# Patient Record
Sex: FEMALE | Race: WHITE | HISPANIC OR LATINO
[De-identification: names, ages, dates, MRNs, and addresses within clinical notes are randomized per-mention and may not be internally consistent; named-entity substitution may affect disease eponyms.]

---

## 2017-02-07 ENCOUNTER — APPOINTMENT (OUTPATIENT)
Dept: PHYSICAL MEDICINE AND REHAB | Facility: CLINIC | Age: 67
End: 2017-02-07

## 2017-03-16 ENCOUNTER — APPOINTMENT (OUTPATIENT)
Dept: RADIATION ONCOLOGY | Facility: CLINIC | Age: 67
End: 2017-03-16

## 2017-03-16 VITALS
RESPIRATION RATE: 20 BRPM | OXYGEN SATURATION: 97 % | DIASTOLIC BLOOD PRESSURE: 74 MMHG | HEART RATE: 98 BPM | SYSTOLIC BLOOD PRESSURE: 150 MMHG

## 2017-03-22 ENCOUNTER — APPOINTMENT (OUTPATIENT)
Dept: ORTHOPEDIC SURGERY | Facility: CLINIC | Age: 67
End: 2017-03-22

## 2017-03-31 ENCOUNTER — APPOINTMENT (OUTPATIENT)
Dept: PHYSICAL MEDICINE AND REHAB | Facility: CLINIC | Age: 67
End: 2017-03-31

## 2017-03-31 VITALS
DIASTOLIC BLOOD PRESSURE: 97 MMHG | TEMPERATURE: 97.7 F | HEART RATE: 97 BPM | OXYGEN SATURATION: 96 % | SYSTOLIC BLOOD PRESSURE: 163 MMHG

## 2017-05-09 ENCOUNTER — APPOINTMENT (OUTPATIENT)
Dept: SURGICAL ONCOLOGY | Facility: CLINIC | Age: 67
End: 2017-05-09

## 2017-05-18 ENCOUNTER — RESULT REVIEW (OUTPATIENT)
Age: 67
End: 2017-05-18

## 2017-05-23 ENCOUNTER — APPOINTMENT (OUTPATIENT)
Dept: SURGICAL ONCOLOGY | Facility: CLINIC | Age: 67
End: 2017-05-23

## 2017-05-23 VITALS — HEIGHT: 64 IN | BODY MASS INDEX: 30.9 KG/M2 | WEIGHT: 181 LBS | OXYGEN SATURATION: 98 % | HEART RATE: 99 BPM

## 2017-05-23 DIAGNOSIS — R26.2 DIFFICULTY IN WALKING, NOT ELSEWHERE CLASSIFIED: ICD-10-CM

## 2017-05-23 DIAGNOSIS — Z08 ENCOUNTER FOR FOLLOW-UP EXAMINATION AFTER COMPLETED TREATMENT FOR MALIGNANT NEOPLASM: ICD-10-CM

## 2017-05-23 DIAGNOSIS — Z85.3 ENCOUNTER FOR FOLLOW-UP EXAMINATION AFTER COMPLETED TREATMENT FOR MALIGNANT NEOPLASM: ICD-10-CM

## 2017-05-23 RX ORDER — ALBUTEROL 90 MCG
90 AEROSOL (GRAM) INHALATION
Refills: 0 | Status: ACTIVE | COMMUNITY

## 2017-06-23 ENCOUNTER — APPOINTMENT (OUTPATIENT)
Dept: ORTHOPEDIC SURGERY | Facility: CLINIC | Age: 67
End: 2017-06-23

## 2017-06-23 DIAGNOSIS — M17.12 UNILATERAL PRIMARY OSTEOARTHRITIS, LEFT KNEE: ICD-10-CM

## 2017-06-23 DIAGNOSIS — M25.562 PAIN IN LEFT KNEE: ICD-10-CM

## 2017-06-23 RX ORDER — BREXPIPRAZOLE 2 MG/1
2 TABLET ORAL
Qty: 30 | Refills: 0 | Status: ACTIVE | COMMUNITY
Start: 2017-06-05

## 2017-06-23 RX ORDER — LEVOTHYROXINE SODIUM 0.14 MG/1
137 TABLET ORAL
Qty: 30 | Refills: 0 | Status: ACTIVE | COMMUNITY
Start: 2017-01-25

## 2017-09-21 ENCOUNTER — APPOINTMENT (OUTPATIENT)
Dept: RADIATION ONCOLOGY | Facility: CLINIC | Age: 67
End: 2017-09-21
Payer: COMMERCIAL

## 2017-09-21 VITALS
TEMPERATURE: 98.5 F | RESPIRATION RATE: 18 BRPM | SYSTOLIC BLOOD PRESSURE: 147 MMHG | OXYGEN SATURATION: 97 % | DIASTOLIC BLOOD PRESSURE: 86 MMHG | HEART RATE: 86 BPM

## 2017-09-21 PROCEDURE — 99213 OFFICE O/P EST LOW 20 MIN: CPT

## 2017-09-21 RX ORDER — ECONAZOLE NITRATE 10 MG/G
1 AEROSOL, FOAM TOPICAL
Qty: 210 | Refills: 0 | Status: ACTIVE | COMMUNITY
Start: 2017-08-05

## 2017-09-21 RX ORDER — KETOCONAZOLE 20 MG/G
2 CREAM TOPICAL
Qty: 180 | Refills: 0 | Status: ACTIVE | COMMUNITY
Start: 2017-08-05

## 2017-09-27 ENCOUNTER — APPOINTMENT (OUTPATIENT)
Dept: ORTHOPEDIC SURGERY | Facility: CLINIC | Age: 67
End: 2017-09-27

## 2017-10-26 ENCOUNTER — APPOINTMENT (OUTPATIENT)
Dept: PHYSICAL MEDICINE AND REHAB | Facility: CLINIC | Age: 67
End: 2017-10-26
Payer: COMMERCIAL

## 2017-10-26 VITALS
TEMPERATURE: 98.3 F | DIASTOLIC BLOOD PRESSURE: 83 MMHG | HEART RATE: 96 BPM | SYSTOLIC BLOOD PRESSURE: 144 MMHG | OXYGEN SATURATION: 97 %

## 2017-10-26 PROCEDURE — 99213 OFFICE O/P EST LOW 20 MIN: CPT

## 2017-12-08 ENCOUNTER — APPOINTMENT (OUTPATIENT)
Dept: VASCULAR SURGERY | Facility: CLINIC | Age: 67
End: 2017-12-08

## 2018-01-02 ENCOUNTER — MOBILE ON CALL (OUTPATIENT)
Age: 68
End: 2018-01-02

## 2018-01-08 ENCOUNTER — APPOINTMENT (OUTPATIENT)
Dept: BARIATRICS/WEIGHT MGMT | Facility: CLINIC | Age: 68
End: 2018-01-08

## 2018-02-23 ENCOUNTER — EMERGENCY (EMERGENCY)
Facility: HOSPITAL | Age: 68
LOS: 1 days | Discharge: HOME | End: 2018-02-23
Attending: INTERNAL MEDICINE | Admitting: INTERNAL MEDICINE
Payer: COMMERCIAL

## 2018-02-23 VITALS
TEMPERATURE: 98 F | OXYGEN SATURATION: 95 % | HEART RATE: 100 BPM | SYSTOLIC BLOOD PRESSURE: 159 MMHG | DIASTOLIC BLOOD PRESSURE: 86 MMHG | RESPIRATION RATE: 15 BRPM

## 2018-02-23 VITALS
WEIGHT: 175.05 LBS | SYSTOLIC BLOOD PRESSURE: 118 MMHG | HEART RATE: 100 BPM | OXYGEN SATURATION: 93 % | RESPIRATION RATE: 16 BRPM | HEIGHT: 64 IN | DIASTOLIC BLOOD PRESSURE: 60 MMHG | TEMPERATURE: 99 F

## 2018-02-23 DIAGNOSIS — Z90.13 ACQUIRED ABSENCE OF BILATERAL BREASTS AND NIPPLES: Chronic | ICD-10-CM

## 2018-02-23 DIAGNOSIS — Z98.89 OTHER SPECIFIED POSTPROCEDURAL STATES: Chronic | ICD-10-CM

## 2018-02-23 LAB
ALBUMIN SERPL ELPH-MCNC: 3.2 G/DL — LOW (ref 3.3–5)
ALP SERPL-CCNC: 222 U/L — HIGH (ref 30–120)
ALT FLD-CCNC: 137 U/L DA — HIGH (ref 10–60)
ANION GAP SERPL CALC-SCNC: 14 MMOL/L — SIGNIFICANT CHANGE UP (ref 5–17)
APPEARANCE UR: CLEAR — SIGNIFICANT CHANGE UP
AST SERPL-CCNC: 134 U/L — HIGH (ref 10–40)
BACTERIA # UR AUTO: ABNORMAL
BILIRUB SERPL-MCNC: 0.2 MG/DL — SIGNIFICANT CHANGE UP (ref 0.2–1.2)
BILIRUB UR-MCNC: NEGATIVE — SIGNIFICANT CHANGE UP
BUN SERPL-MCNC: 10 MG/DL — SIGNIFICANT CHANGE UP (ref 7–23)
CALCIUM SERPL-MCNC: 8.7 MG/DL — SIGNIFICANT CHANGE UP (ref 8.4–10.5)
CHLORIDE SERPL-SCNC: 102 MMOL/L — SIGNIFICANT CHANGE UP (ref 96–108)
CO2 SERPL-SCNC: 20 MMOL/L — LOW (ref 22–31)
COLOR SPEC: YELLOW — SIGNIFICANT CHANGE UP
CREAT SERPL-MCNC: 0.79 MG/DL — SIGNIFICANT CHANGE UP (ref 0.5–1.3)
DIFF PNL FLD: ABNORMAL
EPI CELLS # UR: SIGNIFICANT CHANGE UP
GLUCOSE SERPL-MCNC: 141 MG/DL — HIGH (ref 70–99)
GLUCOSE UR QL: NEGATIVE MG/DL — SIGNIFICANT CHANGE UP
HCT VFR BLD CALC: 35.4 % — SIGNIFICANT CHANGE UP (ref 34.5–45)
HGB BLD-MCNC: 12.4 G/DL — SIGNIFICANT CHANGE UP (ref 11.5–15.5)
KETONES UR-MCNC: NEGATIVE — SIGNIFICANT CHANGE UP
LACTATE SERPL-SCNC: 0.9 MMOL/L — SIGNIFICANT CHANGE UP (ref 0.7–2)
LACTATE SERPL-SCNC: 2.4 MMOL/L — HIGH (ref 0.7–2)
LEUKOCYTE ESTERASE UR-ACNC: NEGATIVE — SIGNIFICANT CHANGE UP
MCHC RBC-ENTMCNC: 32.6 PG — SIGNIFICANT CHANGE UP (ref 27–34)
MCHC RBC-ENTMCNC: 34.9 GM/DL — SIGNIFICANT CHANGE UP (ref 32–36)
MCV RBC AUTO: 93.4 FL — SIGNIFICANT CHANGE UP (ref 80–100)
NITRITE UR-MCNC: NEGATIVE — SIGNIFICANT CHANGE UP
PH UR: 5 — SIGNIFICANT CHANGE UP (ref 5–8)
PLATELET # BLD AUTO: 172 K/UL — SIGNIFICANT CHANGE UP (ref 150–400)
POTASSIUM SERPL-MCNC: 3.9 MMOL/L — SIGNIFICANT CHANGE UP (ref 3.5–5.3)
POTASSIUM SERPL-SCNC: 3.9 MMOL/L — SIGNIFICANT CHANGE UP (ref 3.5–5.3)
PROT SERPL-MCNC: 7.2 G/DL — SIGNIFICANT CHANGE UP (ref 6–8.3)
PROT UR-MCNC: NEGATIVE MG/DL — SIGNIFICANT CHANGE UP
RBC # BLD: 3.79 M/UL — LOW (ref 3.8–5.2)
RBC # FLD: 12.1 % — SIGNIFICANT CHANGE UP (ref 10.3–14.5)
RBC CASTS # UR COMP ASSIST: SIGNIFICANT CHANGE UP /HPF (ref 0–4)
SODIUM SERPL-SCNC: 136 MMOL/L — SIGNIFICANT CHANGE UP (ref 135–145)
SP GR SPEC: 1.01 — SIGNIFICANT CHANGE UP (ref 1.01–1.02)
UROBILINOGEN FLD QL: NEGATIVE MG/DL — SIGNIFICANT CHANGE UP
WBC # BLD: 7.8 K/UL — SIGNIFICANT CHANGE UP (ref 3.8–10.5)
WBC # FLD AUTO: 7.8 K/UL — SIGNIFICANT CHANGE UP (ref 3.8–10.5)
WBC UR QL: SIGNIFICANT CHANGE UP

## 2018-02-23 PROCEDURE — 74177 CT ABD & PELVIS W/CONTRAST: CPT | Mod: 26

## 2018-02-23 PROCEDURE — 96375 TX/PRO/DX INJ NEW DRUG ADDON: CPT

## 2018-02-23 PROCEDURE — 99284 EMERGENCY DEPT VISIT MOD MDM: CPT | Mod: 25

## 2018-02-23 PROCEDURE — 85027 COMPLETE CBC AUTOMATED: CPT

## 2018-02-23 PROCEDURE — 99285 EMERGENCY DEPT VISIT HI MDM: CPT

## 2018-02-23 PROCEDURE — 87040 BLOOD CULTURE FOR BACTERIA: CPT

## 2018-02-23 PROCEDURE — 71045 X-RAY EXAM CHEST 1 VIEW: CPT | Mod: 26

## 2018-02-23 PROCEDURE — 93005 ELECTROCARDIOGRAM TRACING: CPT

## 2018-02-23 PROCEDURE — 81001 URINALYSIS AUTO W/SCOPE: CPT

## 2018-02-23 PROCEDURE — 80053 COMPREHEN METABOLIC PANEL: CPT

## 2018-02-23 PROCEDURE — 96374 THER/PROPH/DIAG INJ IV PUSH: CPT

## 2018-02-23 PROCEDURE — 96361 HYDRATE IV INFUSION ADD-ON: CPT

## 2018-02-23 PROCEDURE — 74019 RADEX ABDOMEN 2 VIEWS: CPT | Mod: 26

## 2018-02-23 PROCEDURE — 83605 ASSAY OF LACTIC ACID: CPT

## 2018-02-23 PROCEDURE — 96376 TX/PRO/DX INJ SAME DRUG ADON: CPT

## 2018-02-23 PROCEDURE — 74177 CT ABD & PELVIS W/CONTRAST: CPT

## 2018-02-23 PROCEDURE — 71045 X-RAY EXAM CHEST 1 VIEW: CPT

## 2018-02-23 PROCEDURE — 93010 ELECTROCARDIOGRAM REPORT: CPT

## 2018-02-23 PROCEDURE — 74019 RADEX ABDOMEN 2 VIEWS: CPT

## 2018-02-23 PROCEDURE — 36415 COLL VENOUS BLD VENIPUNCTURE: CPT

## 2018-02-23 RX ORDER — HYDROMORPHONE HYDROCHLORIDE 2 MG/ML
0.5 INJECTION INTRAMUSCULAR; INTRAVENOUS; SUBCUTANEOUS ONCE
Qty: 0 | Refills: 0 | Status: DISCONTINUED | OUTPATIENT
Start: 2018-02-23 | End: 2018-02-23

## 2018-02-23 RX ORDER — SODIUM CHLORIDE 9 MG/ML
1000 INJECTION INTRAMUSCULAR; INTRAVENOUS; SUBCUTANEOUS ONCE
Qty: 0 | Refills: 0 | Status: COMPLETED | OUTPATIENT
Start: 2018-02-23 | End: 2018-02-23

## 2018-02-23 RX ORDER — IOHEXOL 300 MG/ML
30 INJECTION, SOLUTION INTRAVENOUS ONCE
Qty: 0 | Refills: 0 | Status: COMPLETED | OUTPATIENT
Start: 2018-02-23 | End: 2018-02-23

## 2018-02-23 RX ORDER — ONDANSETRON 8 MG/1
4 TABLET, FILM COATED ORAL ONCE
Qty: 0 | Refills: 0 | Status: COMPLETED | OUTPATIENT
Start: 2018-02-23 | End: 2018-02-23

## 2018-02-23 RX ADMIN — IOHEXOL 30 MILLILITER(S): 300 INJECTION, SOLUTION INTRAVENOUS at 03:40

## 2018-02-23 RX ADMIN — HYDROMORPHONE HYDROCHLORIDE 0.5 MILLIGRAM(S): 2 INJECTION INTRAMUSCULAR; INTRAVENOUS; SUBCUTANEOUS at 02:49

## 2018-02-23 RX ADMIN — HYDROMORPHONE HYDROCHLORIDE 0.5 MILLIGRAM(S): 2 INJECTION INTRAMUSCULAR; INTRAVENOUS; SUBCUTANEOUS at 03:43

## 2018-02-23 RX ADMIN — SODIUM CHLORIDE 1000 MILLILITER(S): 9 INJECTION INTRAMUSCULAR; INTRAVENOUS; SUBCUTANEOUS at 02:46

## 2018-02-23 RX ADMIN — HYDROMORPHONE HYDROCHLORIDE 0.5 MILLIGRAM(S): 2 INJECTION INTRAMUSCULAR; INTRAVENOUS; SUBCUTANEOUS at 03:26

## 2018-02-23 RX ADMIN — SODIUM CHLORIDE 1000 MILLILITER(S): 9 INJECTION INTRAMUSCULAR; INTRAVENOUS; SUBCUTANEOUS at 03:55

## 2018-02-23 RX ADMIN — ONDANSETRON 4 MILLIGRAM(S): 8 TABLET, FILM COATED ORAL at 02:49

## 2018-02-23 RX ADMIN — HYDROMORPHONE HYDROCHLORIDE 0.5 MILLIGRAM(S): 2 INJECTION INTRAMUSCULAR; INTRAVENOUS; SUBCUTANEOUS at 05:03

## 2018-02-23 NOTE — ED PROVIDER NOTE - SIGNIFICANT NEGATIVE FINDINGS
no headache, no chest pain, no SOB, no palpitations, no diarrhea, no urinary symptoms, no GI  bleeding. no neuro changes.

## 2018-02-23 NOTE — ED ADULT NURSE NOTE - OBJECTIVE STATEMENT
67 yr old female c/o LLQ abdominal pain and nausea. Pt states she had one episode of vomiting at 0000. Denies diarrhea. Hx: diverticulitis.

## 2018-02-23 NOTE — ED PROVIDER NOTE - OBJECTIVE STATEMENT
''I think I have a diverticulitis attack, the pain is very acute', c/o left side abdominal pain started yesterday afternoon. 68 y/o WF h/o  Cholecystectomy Gastric Bypass  C- section x 2 , Diverticulitis  C/C ''I think I have a diverticulitis attack, the pain is very acute', c/o left lower quadrant  abdominal pain,  started yesterday afternoon with N/V x 2 this evening. Feels bloated and not passing gas.. No fever, no chills no  symptoms no GIB.

## 2018-02-23 NOTE — ED PROVIDER NOTE - PMH
Anxiety    Arthritis    Asthma    Breast Cancer    Depression    Environmental Allergies  dust, mold, mildew, cat dander,  Fibromyalgia    Herniated Disc    Hypoglycemia    Hypothyroid    Laryngeal cancer  October 2015 & had Radiation  Neuropathy  lumbar herniated disc affecting feet  Obstructive Sleep Apnea    Osteoarthritis    Osteoporosis    Patellar Fracture  left 11/09  Vocal cord nodule

## 2018-02-23 NOTE — ED ADULT NURSE NOTE - PMH
Anxiety    Arthritis    Asthma    Breast Cancer    Depression    Diverticulitis    Environmental Allergies  dust, mold, mildew, cat dander,  Fibromyalgia    Herniated Disc    Hypoglycemia    Hypothyroid    Laryngeal cancer  October 2015 & had Radiation  Neuropathy  lumbar herniated disc affecting feet  Obstructive Sleep Apnea    Osteoarthritis    Osteoporosis    Patellar Fracture  left 11/09  Vocal cord nodule

## 2018-02-23 NOTE — ED PROVIDER NOTE - PSH
Anal Abscess     Section  X 2  Cosmetic Surgery  liposuction abdomen 11/24/10  H/O breast surgery  Reconstructive surgery - Right & Left  S/P carpal tunnel release  Bilateral  S/P Cholecystectomy    S/P excision of vocal cord nodule  2015  S/P Gastric Bypass   Dr. Gibson  S/P Lumpectomy of Breast  left   S/P mastectomy, bilateral

## 2018-02-23 NOTE — ED ADULT TRIAGE NOTE - CHIEF COMPLAINT QUOTE
''I think I have a diverticulitis attack, the pain is very acute', c/o left side abdominal pain started yesterday afternoon.

## 2018-02-28 ENCOUNTER — OUTPATIENT (OUTPATIENT)
Dept: OUTPATIENT SERVICES | Facility: HOSPITAL | Age: 68
LOS: 1 days | End: 2018-02-28
Payer: COMMERCIAL

## 2018-02-28 ENCOUNTER — APPOINTMENT (OUTPATIENT)
Dept: ULTRASOUND IMAGING | Facility: CLINIC | Age: 68
End: 2018-02-28
Payer: COMMERCIAL

## 2018-02-28 DIAGNOSIS — Z98.89 OTHER SPECIFIED POSTPROCEDURAL STATES: Chronic | ICD-10-CM

## 2018-02-28 DIAGNOSIS — Z90.13 ACQUIRED ABSENCE OF BILATERAL BREASTS AND NIPPLES: Chronic | ICD-10-CM

## 2018-02-28 DIAGNOSIS — Z00.8 ENCOUNTER FOR OTHER GENERAL EXAMINATION: ICD-10-CM

## 2018-02-28 LAB
CULTURE RESULTS: SIGNIFICANT CHANGE UP
CULTURE RESULTS: SIGNIFICANT CHANGE UP
SPECIMEN SOURCE: SIGNIFICANT CHANGE UP
SPECIMEN SOURCE: SIGNIFICANT CHANGE UP

## 2018-02-28 PROCEDURE — 76700 US EXAM ABDOM COMPLETE: CPT

## 2018-02-28 PROCEDURE — 76700 US EXAM ABDOM COMPLETE: CPT | Mod: 26

## 2018-03-08 ENCOUNTER — APPOINTMENT (OUTPATIENT)
Dept: RADIATION ONCOLOGY | Facility: CLINIC | Age: 68
End: 2018-03-08
Payer: COMMERCIAL

## 2018-03-08 VITALS
OXYGEN SATURATION: 97 % | WEIGHT: 181.99 LBS | SYSTOLIC BLOOD PRESSURE: 129 MMHG | HEART RATE: 92 BPM | HEIGHT: 64 IN | TEMPERATURE: 97.8 F | BODY MASS INDEX: 31.07 KG/M2 | RESPIRATION RATE: 18 BRPM | DIASTOLIC BLOOD PRESSURE: 80 MMHG

## 2018-03-08 PROCEDURE — 99213 OFFICE O/P EST LOW 20 MIN: CPT

## 2018-03-28 ENCOUNTER — OUTPATIENT (OUTPATIENT)
Dept: OUTPATIENT SERVICES | Facility: HOSPITAL | Age: 68
LOS: 1 days | End: 2018-03-28
Payer: COMMERCIAL

## 2018-03-28 VITALS
WEIGHT: 175.05 LBS | TEMPERATURE: 99 F | OXYGEN SATURATION: 98 % | SYSTOLIC BLOOD PRESSURE: 121 MMHG | DIASTOLIC BLOOD PRESSURE: 81 MMHG | RESPIRATION RATE: 20 BRPM | HEIGHT: 64 IN | HEART RATE: 96 BPM

## 2018-03-28 DIAGNOSIS — Z98.89 OTHER SPECIFIED POSTPROCEDURAL STATES: Chronic | ICD-10-CM

## 2018-03-28 DIAGNOSIS — F41.9 ANXIETY DISORDER, UNSPECIFIED: ICD-10-CM

## 2018-03-28 DIAGNOSIS — C32.9 MALIGNANT NEOPLASM OF LARYNX, UNSPECIFIED: ICD-10-CM

## 2018-03-28 DIAGNOSIS — G47.33 OBSTRUCTIVE SLEEP APNEA (ADULT) (PEDIATRIC): ICD-10-CM

## 2018-03-28 DIAGNOSIS — C32.0 MALIGNANT NEOPLASM OF GLOTTIS: ICD-10-CM

## 2018-03-28 DIAGNOSIS — J45.909 UNSPECIFIED ASTHMA, UNCOMPLICATED: ICD-10-CM

## 2018-03-28 DIAGNOSIS — Z90.13 ACQUIRED ABSENCE OF BILATERAL BREASTS AND NIPPLES: Chronic | ICD-10-CM

## 2018-03-28 DIAGNOSIS — Z01.818 ENCOUNTER FOR OTHER PREPROCEDURAL EXAMINATION: ICD-10-CM

## 2018-03-28 LAB
ANION GAP SERPL CALC-SCNC: 12 MMOL/L — SIGNIFICANT CHANGE UP (ref 5–17)
BUN SERPL-MCNC: 12 MG/DL — SIGNIFICANT CHANGE UP (ref 7–23)
CALCIUM SERPL-MCNC: 9.6 MG/DL — SIGNIFICANT CHANGE UP (ref 8.4–10.5)
CHLORIDE SERPL-SCNC: 100 MMOL/L — SIGNIFICANT CHANGE UP (ref 96–108)
CO2 SERPL-SCNC: 24 MMOL/L — SIGNIFICANT CHANGE UP (ref 22–31)
CREAT SERPL-MCNC: 0.65 MG/DL — SIGNIFICANT CHANGE UP (ref 0.5–1.3)
GLUCOSE SERPL-MCNC: 105 MG/DL — HIGH (ref 70–99)
HCT VFR BLD CALC: 39.8 % — SIGNIFICANT CHANGE UP (ref 34.5–45)
HGB BLD-MCNC: 13.4 G/DL — SIGNIFICANT CHANGE UP (ref 11.5–15.5)
MCHC RBC-ENTMCNC: 32.8 PG — SIGNIFICANT CHANGE UP (ref 27–34)
MCHC RBC-ENTMCNC: 33.7 GM/DL — SIGNIFICANT CHANGE UP (ref 32–36)
MCV RBC AUTO: 97.3 FL — SIGNIFICANT CHANGE UP (ref 80–100)
NRBC # BLD: 0 /100 WBCS — SIGNIFICANT CHANGE UP (ref 0–0)
PLATELET # BLD AUTO: 215 K/UL — SIGNIFICANT CHANGE UP (ref 150–400)
POTASSIUM SERPL-MCNC: 4.5 MMOL/L — SIGNIFICANT CHANGE UP (ref 3.5–5.3)
POTASSIUM SERPL-SCNC: 4.5 MMOL/L — SIGNIFICANT CHANGE UP (ref 3.5–5.3)
RBC # BLD: 4.09 M/UL — SIGNIFICANT CHANGE UP (ref 3.8–5.2)
RBC # FLD: 13.5 % — SIGNIFICANT CHANGE UP (ref 10.3–14.5)
SODIUM SERPL-SCNC: 136 MMOL/L — SIGNIFICANT CHANGE UP (ref 135–145)
WBC # BLD: 4.73 K/UL — SIGNIFICANT CHANGE UP (ref 3.8–10.5)
WBC # FLD AUTO: 4.73 K/UL — SIGNIFICANT CHANGE UP (ref 3.8–10.5)

## 2018-03-28 PROCEDURE — 80048 BASIC METABOLIC PNL TOTAL CA: CPT

## 2018-03-28 PROCEDURE — G0463: CPT

## 2018-03-28 PROCEDURE — 85027 COMPLETE CBC AUTOMATED: CPT

## 2018-03-28 RX ORDER — BREXPIPRAZOLE 0.25 MG/1
1 TABLET ORAL
Qty: 0 | Refills: 0 | COMMUNITY

## 2018-03-28 RX ORDER — CLONAZEPAM 1 MG
0.5 TABLET ORAL
Qty: 0 | Refills: 0 | COMMUNITY

## 2018-03-28 RX ORDER — LIDOCAINE HCL 20 MG/ML
0.2 VIAL (ML) INJECTION ONCE
Qty: 0 | Refills: 0 | Status: DISCONTINUED | OUTPATIENT
Start: 2018-04-11 | End: 2018-04-26

## 2018-03-28 RX ORDER — SODIUM CHLORIDE 9 MG/ML
3 INJECTION INTRAMUSCULAR; INTRAVENOUS; SUBCUTANEOUS EVERY 8 HOURS
Qty: 0 | Refills: 0 | Status: DISCONTINUED | OUTPATIENT
Start: 2018-04-11 | End: 2018-04-26

## 2018-03-28 NOTE — H&P PST ADULT - NSANTHOSAYNRD_GEN_A_CORE
No. AGUSTÍN screening performed.  STOP BANG Legend: 0-2 = LOW Risk; 3-4 = INTERMEDIATE Risk; 5-8 = HIGH Risk

## 2018-03-28 NOTE — H&P PST ADULT - NEGATIVE CARDIOVASCULAR SYMPTOMS
no palpitations/no peripheral edema/no orthopnea/no paroxysmal nocturnal dyspnea/no chest pain/no claudication/no dyspnea on exertion

## 2018-03-28 NOTE — H&P PST ADULT - HISTORY OF PRESENT ILLNESS
65F PMH shmuel-en-y gastric bypass, breast ca s/p bilateral mastectomy, laryngeal ca s/p surgical resection and radiation, hypothyroidism, depression/anxiety, p/w abdominal pain that began this morning. Pt. reports that approx. 2 days ago, she experienced constipation refractory to her stool softeners, with her abdomen feeling uncomfortable since then. Today in AM, she awoke to 10/10 abdominal pain in the left lower quadrant. Pain radiated across her lower abdomen. Associated w/ loose stools, worsening pain w/ BMs. She is not passing any gas. She denies N/V. Pt. went to her PMD today, and was noted to have T100.5.     ED Vitals: Tmax 100.1, HR , -153/75-82, RR 18, SpO2: 97-100RA   In ED, pt. was given Cipro 400 x 1, Flagyl 500 x 1. CT Abd/Pelvis in ED revealed sigmoid colon diverticulitis w/ no bowel obstruction, organized fluid collection or intraperitoneal free air. 67F PMH shmuel-en-y gastric bypass, breast ca s/p bilateral mastectomy, laryngeal ca s/p surgical resection and radiation, hypothyroidism, depression/anxiety. 68 F PMH  of Anxiety and depression, Asthma ( mild ) no recent exacerbation , hypothyroidism , osteoporosis , shmuel-en-y gastric bypass, breast ca s/p bilateral mastectomy, laryngeal ca s/p surgical resection and radiation, hypothyroidism. Pt has been c/o hoarseness ,difficulty swallowing at times followed by ENT . As per patient Dr White saw a spot in her throat during laryngoscopy  . Presents PST for scheduled Laryngoscopy with Excisions on 4/11/2018.

## 2018-03-28 NOTE — H&P PST ADULT - PMH
Anxiety    Arthritis    Asthma    Breast Cancer    Depression    Diverticulitis    Environmental Allergies  dust, mold, mildew, cat dander,  Fibromyalgia    Herniated Disc    Hypoglycemia    Hypothyroid    Laryngeal cancer  October 2015 & had Radiation  Neuropathy  lumbar herniated disc affecting feet  Obstructive Sleep Apnea    Osteoarthritis    Osteoporosis    Patellar Fracture  left 11/09  Vocal cord nodule Anxiety    Arthritis    Asthma  no h/o recent exacerbation  Breast Cancer  s/p bilateral mastectomy  Depression    Diverticulitis  history treated with ABT  Environmental Allergies  dust, mold, mildew, cat dander,  Fibromyalgia    Herniated Disc    Hypothyroid    Laryngeal cancer  October 2015 & had Radiation  Neuropathy  lumbar herniated disc affecting feet  Obstructive Sleep Apnea    Osteoarthritis  critera  Osteoporosis    Patellar Fracture  left 11/09  Vocal cord nodule

## 2018-03-28 NOTE — H&P PST ADULT - MALLAMPATI CLASS
Class II - visualization of the soft palate, fauces, and uvula 15 inch neck/Class II - visualization of the soft palate, fauces, and uvula

## 2018-03-28 NOTE — H&P PST ADULT - ACTIVITY
fair pt is very active , walk daily , able to take 1 flight of stairs ,housework without any distress

## 2018-03-28 NOTE — H&P PST ADULT - NEGATIVE ENMT SYMPTOMS
no nasal congestion/no nasal obstruction/no vertigo/no sinus symptoms/no hearing difficulty/no abnormal taste sensation/no gum bleeding/no ear pain/no tinnitus/no nasal discharge/no nose bleeds/no recurrent cold sores

## 2018-03-28 NOTE — H&P PST ADULT - PROBLEM SELECTOR PLAN 1
Laryngoscopy with Excisions on 4/11/2018.  Pre- Op instructions discussed   CBC,BMP sent   Medical eval

## 2018-04-10 ENCOUNTER — TRANSCRIPTION ENCOUNTER (OUTPATIENT)
Age: 68
End: 2018-04-10

## 2018-04-11 ENCOUNTER — RESULT REVIEW (OUTPATIENT)
Age: 68
End: 2018-04-11

## 2018-04-11 ENCOUNTER — OUTPATIENT (OUTPATIENT)
Dept: OUTPATIENT SERVICES | Facility: HOSPITAL | Age: 68
LOS: 1 days | End: 2018-04-11
Payer: COMMERCIAL

## 2018-04-11 VITALS
WEIGHT: 175.05 LBS | RESPIRATION RATE: 16 BRPM | TEMPERATURE: 98 F | DIASTOLIC BLOOD PRESSURE: 82 MMHG | OXYGEN SATURATION: 98 % | HEIGHT: 64 IN | SYSTOLIC BLOOD PRESSURE: 130 MMHG | HEART RATE: 81 BPM

## 2018-04-11 VITALS
SYSTOLIC BLOOD PRESSURE: 155 MMHG | OXYGEN SATURATION: 98 % | HEART RATE: 100 BPM | RESPIRATION RATE: 16 BRPM | DIASTOLIC BLOOD PRESSURE: 74 MMHG

## 2018-04-11 DIAGNOSIS — Z90.13 ACQUIRED ABSENCE OF BILATERAL BREASTS AND NIPPLES: Chronic | ICD-10-CM

## 2018-04-11 DIAGNOSIS — Z98.89 OTHER SPECIFIED POSTPROCEDURAL STATES: Chronic | ICD-10-CM

## 2018-04-11 DIAGNOSIS — Z01.818 ENCOUNTER FOR OTHER PREPROCEDURAL EXAMINATION: ICD-10-CM

## 2018-04-11 DIAGNOSIS — C32.0 MALIGNANT NEOPLASM OF GLOTTIS: ICD-10-CM

## 2018-04-11 PROCEDURE — 88305 TISSUE EXAM BY PATHOLOGIST: CPT

## 2018-04-11 PROCEDURE — 88305 TISSUE EXAM BY PATHOLOGIST: CPT | Mod: 26

## 2018-04-11 PROCEDURE — 31536 LARYNGOSCOPY W/BX & OP SCOPE: CPT

## 2018-04-11 RX ORDER — ACETAMINOPHEN 500 MG
1000 TABLET ORAL ONCE
Qty: 0 | Refills: 0 | Status: DISCONTINUED | OUTPATIENT
Start: 2018-04-11 | End: 2018-04-26

## 2018-04-11 RX ORDER — HYDROMORPHONE HYDROCHLORIDE 2 MG/ML
0.25 INJECTION INTRAMUSCULAR; INTRAVENOUS; SUBCUTANEOUS
Qty: 0 | Refills: 0 | Status: DISCONTINUED | OUTPATIENT
Start: 2018-04-11 | End: 2018-04-11

## 2018-04-11 RX ORDER — ONDANSETRON 8 MG/1
4 TABLET, FILM COATED ORAL ONCE
Qty: 0 | Refills: 0 | Status: DISCONTINUED | OUTPATIENT
Start: 2018-04-11 | End: 2018-04-26

## 2018-04-11 RX ORDER — SODIUM CHLORIDE 9 MG/ML
1000 INJECTION, SOLUTION INTRAVENOUS
Qty: 0 | Refills: 0 | Status: DISCONTINUED | OUTPATIENT
Start: 2018-04-11 | End: 2018-04-26

## 2018-04-11 NOTE — ASU PATIENT PROFILE, ADULT - PMH
Anxiety    Arthritis    Asthma  no h/o recent exacerbation  Breast Cancer  s/p bilateral mastectomy  Depression    Diverticulitis  history treated with ABT  Environmental Allergies  dust, mold, mildew, cat dander,  Fibromyalgia    Herniated Disc    Hypothyroid    Laryngeal cancer  October 2015 & had Radiation  Neuropathy  lumbar herniated disc affecting feet  Obstructive Sleep Apnea    Osteoarthritis  critera  Osteoporosis    Patellar Fracture  left 11/09  Vocal cord nodule

## 2018-04-11 NOTE — BRIEF OPERATIVE NOTE - PROCEDURE
<<-----Click on this checkbox to enter Procedure Microlaryngoscopy with biopsy in adult  04/11/2018    Active  PPERLMAN

## 2018-04-11 NOTE — ASU DISCHARGE PLAN (ADULT/PEDIATRIC). - NOTIFY
Pain not relieved by Medications/Increased Irritability or Sluggishness/Fever greater than 101/Excessive Diarrhea/Inability to Tolerate Liquids or Foods/Persistent Nausea and Vomiting/Swelling that continues/Unable to Urinate/Bleeding that does not stop

## 2018-04-17 LAB — SURGICAL PATHOLOGY STUDY: SIGNIFICANT CHANGE UP

## 2018-04-26 ENCOUNTER — APPOINTMENT (OUTPATIENT)
Dept: OTOLARYNGOLOGY | Facility: CLINIC | Age: 68
End: 2018-04-26
Payer: COMMERCIAL

## 2018-04-26 VITALS
WEIGHT: 181 LBS | SYSTOLIC BLOOD PRESSURE: 151 MMHG | HEIGHT: 64 IN | DIASTOLIC BLOOD PRESSURE: 94 MMHG | HEART RATE: 93 BPM | BODY MASS INDEX: 30.9 KG/M2

## 2018-04-26 PROCEDURE — 31575 DIAGNOSTIC LARYNGOSCOPY: CPT

## 2018-04-26 PROCEDURE — 99204 OFFICE O/P NEW MOD 45 MIN: CPT | Mod: 25

## 2018-04-26 RX ORDER — CLARITHROMYCIN 500 MG/1
500 TABLET, FILM COATED ORAL
Qty: 20 | Refills: 0 | Status: COMPLETED | COMMUNITY
Start: 2017-02-01 | End: 2018-04-26

## 2018-04-26 RX ORDER — LEVOFLOXACIN 500 MG/1
500 TABLET, FILM COATED ORAL
Qty: 10 | Refills: 0 | Status: COMPLETED | COMMUNITY
Start: 2017-05-22 | End: 2018-04-26

## 2018-04-26 RX ORDER — DOXEPIN HYDROCHLORIDE 50 MG/1
50 CAPSULE ORAL
Refills: 0 | Status: ACTIVE | COMMUNITY

## 2018-04-26 RX ORDER — BUPROPION HYDROCHLORIDE 300 MG/1
300 TABLET, EXTENDED RELEASE ORAL
Refills: 0 | Status: ACTIVE | COMMUNITY

## 2018-04-26 RX ORDER — OMEPRAZOLE 20 MG/1
TABLET, DELAYED RELEASE ORAL
Refills: 0 | Status: ACTIVE | COMMUNITY

## 2018-04-26 RX ORDER — CELECOXIB 100 MG/1
100 CAPSULE ORAL TWICE DAILY
Qty: 180 | Refills: 2 | Status: COMPLETED | COMMUNITY
Start: 2017-06-23 | End: 2018-04-26

## 2018-04-26 RX ORDER — FEXOFENADINE HCL 60 MG
CAPSULE ORAL
Refills: 0 | Status: ACTIVE | COMMUNITY

## 2018-04-27 ENCOUNTER — APPOINTMENT (OUTPATIENT)
Dept: PHYSICAL MEDICINE AND REHAB | Facility: CLINIC | Age: 68
End: 2018-04-27

## 2018-05-15 ENCOUNTER — APPOINTMENT (OUTPATIENT)
Dept: OTOLARYNGOLOGY | Facility: CLINIC | Age: 68
End: 2018-05-15
Payer: COMMERCIAL

## 2018-05-15 VITALS
DIASTOLIC BLOOD PRESSURE: 92 MMHG | HEIGHT: 64 IN | BODY MASS INDEX: 30.9 KG/M2 | SYSTOLIC BLOOD PRESSURE: 150 MMHG | HEART RATE: 103 BPM | WEIGHT: 181 LBS

## 2018-05-15 PROCEDURE — 31579 LARYNGOSCOPY TELESCOPIC: CPT

## 2018-05-15 PROCEDURE — 99214 OFFICE O/P EST MOD 30 MIN: CPT | Mod: 25

## 2018-05-29 ENCOUNTER — OUTPATIENT (OUTPATIENT)
Dept: OUTPATIENT SERVICES | Facility: HOSPITAL | Age: 68
LOS: 1 days | End: 2018-05-29

## 2018-05-29 ENCOUNTER — APPOINTMENT (OUTPATIENT)
Dept: SURGICAL ONCOLOGY | Facility: CLINIC | Age: 68
End: 2018-05-29
Payer: COMMERCIAL

## 2018-05-29 VITALS
HEART RATE: 105 BPM | RESPIRATION RATE: 15 BRPM | WEIGHT: 180 LBS | SYSTOLIC BLOOD PRESSURE: 163 MMHG | DIASTOLIC BLOOD PRESSURE: 92 MMHG | HEIGHT: 64 IN | BODY MASS INDEX: 30.73 KG/M2

## 2018-05-29 VITALS
SYSTOLIC BLOOD PRESSURE: 128 MMHG | TEMPERATURE: 97 F | HEART RATE: 94 BPM | WEIGHT: 179.9 LBS | DIASTOLIC BLOOD PRESSURE: 84 MMHG | RESPIRATION RATE: 16 BRPM | OXYGEN SATURATION: 99 % | HEIGHT: 63 IN

## 2018-05-29 DIAGNOSIS — K21.9 GASTRO-ESOPHAGEAL REFLUX DISEASE WITHOUT ESOPHAGITIS: ICD-10-CM

## 2018-05-29 DIAGNOSIS — F41.9 ANXIETY DISORDER, UNSPECIFIED: ICD-10-CM

## 2018-05-29 DIAGNOSIS — Z98.89 OTHER SPECIFIED POSTPROCEDURAL STATES: Chronic | ICD-10-CM

## 2018-05-29 DIAGNOSIS — J38.7 OTHER DISEASES OF LARYNX: ICD-10-CM

## 2018-05-29 DIAGNOSIS — E03.9 HYPOTHYROIDISM, UNSPECIFIED: ICD-10-CM

## 2018-05-29 DIAGNOSIS — Z90.13 ACQUIRED ABSENCE OF BILATERAL BREASTS AND NIPPLES: Chronic | ICD-10-CM

## 2018-05-29 DIAGNOSIS — T78.40XA ALLERGY, UNSPECIFIED, INITIAL ENCOUNTER: ICD-10-CM

## 2018-05-29 DIAGNOSIS — Z98.890 OTHER SPECIFIED POSTPROCEDURAL STATES: Chronic | ICD-10-CM

## 2018-05-29 DIAGNOSIS — G62.9 POLYNEUROPATHY, UNSPECIFIED: ICD-10-CM

## 2018-05-29 DIAGNOSIS — I89.0 LYMPHEDEMA, NOT ELSEWHERE CLASSIFIED: ICD-10-CM

## 2018-05-29 DIAGNOSIS — R06.83 SNORING: ICD-10-CM

## 2018-05-29 LAB
BUN SERPL-MCNC: 15 MG/DL — SIGNIFICANT CHANGE UP (ref 7–23)
CALCIUM SERPL-MCNC: 9.2 MG/DL — SIGNIFICANT CHANGE UP (ref 8.4–10.5)
CHLORIDE SERPL-SCNC: 97 MMOL/L — LOW (ref 98–107)
CO2 SERPL-SCNC: 25 MMOL/L — SIGNIFICANT CHANGE UP (ref 22–31)
CREAT SERPL-MCNC: 0.71 MG/DL — SIGNIFICANT CHANGE UP (ref 0.5–1.3)
GLUCOSE SERPL-MCNC: 98 MG/DL — SIGNIFICANT CHANGE UP (ref 70–99)
HCT VFR BLD CALC: 38.1 % — SIGNIFICANT CHANGE UP (ref 34.5–45)
HGB BLD-MCNC: 13 G/DL — SIGNIFICANT CHANGE UP (ref 11.5–15.5)
MCHC RBC-ENTMCNC: 32.3 PG — SIGNIFICANT CHANGE UP (ref 27–34)
MCHC RBC-ENTMCNC: 34.1 % — SIGNIFICANT CHANGE UP (ref 32–36)
MCV RBC AUTO: 94.8 FL — SIGNIFICANT CHANGE UP (ref 80–100)
NRBC # FLD: 0 — SIGNIFICANT CHANGE UP
PLATELET # BLD AUTO: 226 K/UL — SIGNIFICANT CHANGE UP (ref 150–400)
PMV BLD: 8.9 FL — SIGNIFICANT CHANGE UP (ref 7–13)
POTASSIUM SERPL-MCNC: 3.7 MMOL/L — SIGNIFICANT CHANGE UP (ref 3.5–5.3)
POTASSIUM SERPL-SCNC: 3.7 MMOL/L — SIGNIFICANT CHANGE UP (ref 3.5–5.3)
RBC # BLD: 4.02 M/UL — SIGNIFICANT CHANGE UP (ref 3.8–5.2)
RBC # FLD: 12.8 % — SIGNIFICANT CHANGE UP (ref 10.3–14.5)
SODIUM SERPL-SCNC: 138 MMOL/L — SIGNIFICANT CHANGE UP (ref 135–145)
WBC # BLD: 6.03 K/UL — SIGNIFICANT CHANGE UP (ref 3.8–10.5)
WBC # FLD AUTO: 6.03 K/UL — SIGNIFICANT CHANGE UP (ref 3.8–10.5)

## 2018-05-29 PROCEDURE — 99214 OFFICE O/P EST MOD 30 MIN: CPT

## 2018-05-29 RX ORDER — HYALURONATE SOD, CROSS-LINKED 30 MG/3 ML
30 SYRINGE (ML) INTRAARTICULAR
Qty: 3 | Refills: 0 | Status: DISCONTINUED | COMMUNITY
Start: 2017-03-01 | End: 2018-05-29

## 2018-05-29 RX ORDER — DEXTROAMPHETAMINE SULFATE, DEXTROAMPHETAMINE SACCHARATE, AMPHETAMINE ASPARTATE MONOHYDRATE, AND AMPHETAMINE SULFATE 9.375; 9.375; 9.375; 9.375 MG/1; MG/1; MG/1; MG/1
37.5 CAPSULE, EXTENDED RELEASE ORAL
Refills: 0 | Status: ACTIVE | COMMUNITY

## 2018-05-29 RX ORDER — DEXTROAMPHETAMINE SACCHARATE, AMPHETAMINE ASPARTATE, DEXTROAMPHETAMINE SULFATE AND AMPHETAMINE SULFATE 1.875; 1.875; 1.875; 1.875 MG/1; MG/1; MG/1; MG/1
0 TABLET ORAL
Qty: 0 | Refills: 0 | COMMUNITY

## 2018-05-29 RX ORDER — KETOCONAZOLE 20.5 MG/ML
2 SHAMPOO, SUSPENSION TOPICAL
Qty: 360 | Refills: 0 | Status: DISCONTINUED | COMMUNITY
Start: 2017-08-05 | End: 2018-05-29

## 2018-05-29 RX ORDER — SODIUM CHLORIDE 9 MG/ML
1000 INJECTION, SOLUTION INTRAVENOUS
Qty: 0 | Refills: 0 | Status: DISCONTINUED | OUTPATIENT
Start: 2018-06-27 | End: 2018-06-27

## 2018-05-29 RX ORDER — CLONAZEPAM 1 MG
1 TABLET ORAL
Qty: 0 | Refills: 0 | COMMUNITY

## 2018-05-29 NOTE — H&P PST ADULT - HISTORY OF PRESENT ILLNESS
67 yo female with hx of Anxiety and depression, hypothyroidism, osteoporosis , shmuel-en-y gastric bypass, breast ca s/p bilateral mastectomy, asthma presents to have PST evaluation for microlaryngoscopy with resection of laryngeal on 6/8/2018. Patient reports, hx of laryngeal CA (2015), treated with surgical resection and radiation, has been c/o hoarseness, coughing, went for an evaluation, "lesion was found in throat", had resection & biopsy done by Dr. White in 4/2018 - benign. Patient states, went for f/u evaluation, found another lesion, surgical intervention was recommended. Denies SOB, dysphagia, but c/o voice hoarseness and coughing with phlegm.

## 2018-05-29 NOTE — H&P PST ADULT - PMH
Anxiety    Arthritis    Asthma  no h/o recent exacerbation  Breast Cancer  s/p bilateral mastectomy  Depression    Diverticulitis  history treated with ABT  Environmental Allergies  dust, mold, mildew, cat dander,  Fibromyalgia    GERD (gastroesophageal reflux disease)    Herniated Disc    Hypothyroid    Laryngeal cancer  October 2015 & had Radiation  Neuropathy  lumbar herniated disc affecting feet  Obstructive Sleep Apnea  pt denies  Osteoarthritis  critera  Osteoporosis    Patellar Fracture  left 11/09  Vocal cord nodule

## 2018-05-29 NOTE — H&P PST ADULT - NEGATIVE GENERAL GENITOURINARY SYMPTOMS
no dysuria/no bladder infections/no urinary hesitancy/no hematuria/no flank pain L/no flank pain R/normal urinary frequency

## 2018-05-29 NOTE — H&P PST ADULT - PROBLEM SELECTOR PLAN 7
patient is allergic to sulfa, latex, betadine, adhesive. OR booking was notified via fax. patient is allergic to sulfa, latex, betadine, NSAID. OR booking was notified via fax.

## 2018-05-29 NOTE — H&P PST ADULT - RS GEN PE MLT RESP DETAILS PC
no wheezes/normal/no rales/breath sounds equal/clear to auscultation bilaterally/no rhonchi/respirations non-labored

## 2018-05-29 NOTE — H&P PST ADULT - OTHER CARE PROVIDERS
Dr. Christel Quezada (pulmonologist)  - [phone] / Dr. Cole Sky  (oncologist) [phone] / Dr. Peters (rheumatologist)

## 2018-05-29 NOTE — H&P PST ADULT - FAMILY HISTORY
Mother  Still living? No  Family history of liver cancer, Age at diagnosis: Age Unknown  Family history of breast cancer, Age at diagnosis: Age Unknown     Father  Still living? No  Family history of stroke, Age at diagnosis: Age Unknown

## 2018-05-29 NOTE — H&P PST ADULT - NEGATIVE ENMT SYMPTOMS
no vertigo/no nasal discharge/no tinnitus/no sinus symptoms/no dysphagia/no nose bleeds/no recurrent cold sores/no ear pain/no nasal obstruction/no abnormal taste sensation/no hearing difficulty/no gum bleeding/no throat pain/no nasal congestion

## 2018-05-29 NOTE — H&P PST ADULT - PROBLEM SELECTOR PLAN 1
Scheduled for microlaryngoscopy with resection of laryngeal on 6/8/2018. labs done and results pending. Preop instruction given and explained. Verbalized understanding.   Patient states, she's seen by PCP for medical evaluation. Will obtain.

## 2018-05-29 NOTE — H&P PST ADULT - PSH
Anal Abscess     Section  X 2  Cosmetic Surgery  liposuction abdomen 11/24/10  H/O breast surgery  Reconstructive surgery - Right & Left  History of laryngoscopy  excision of lesion & biopsy 2018  S/P carpal tunnel release  Bilateral  S/P Cholecystectomy    S/P excision of vocal cord nodule  2015  S/P Gastric Bypass   Dr. Gibson  S/P Lumpectomy of Breast  left   S/P mastectomy, bilateral  2010

## 2018-06-26 ENCOUNTER — TRANSCRIPTION ENCOUNTER (OUTPATIENT)
Age: 68
End: 2018-06-26

## 2018-06-27 ENCOUNTER — RESULT REVIEW (OUTPATIENT)
Age: 68
End: 2018-06-27

## 2018-06-27 ENCOUNTER — APPOINTMENT (OUTPATIENT)
Dept: OTOLARYNGOLOGY | Facility: HOSPITAL | Age: 68
End: 2018-06-27

## 2018-06-27 ENCOUNTER — OUTPATIENT (OUTPATIENT)
Dept: OUTPATIENT SERVICES | Facility: HOSPITAL | Age: 68
LOS: 1 days | Discharge: HOME | End: 2018-06-27

## 2018-06-27 VITALS
WEIGHT: 179.9 LBS | DIASTOLIC BLOOD PRESSURE: 73 MMHG | HEIGHT: 63 IN | OXYGEN SATURATION: 96 % | HEART RATE: 82 BPM | SYSTOLIC BLOOD PRESSURE: 148 MMHG | TEMPERATURE: 98 F | RESPIRATION RATE: 16 BRPM

## 2018-06-27 VITALS
DIASTOLIC BLOOD PRESSURE: 67 MMHG | SYSTOLIC BLOOD PRESSURE: 137 MMHG | RESPIRATION RATE: 14 BRPM | HEART RATE: 61 BPM | OXYGEN SATURATION: 97 %

## 2018-06-27 DIAGNOSIS — Z90.13 ACQUIRED ABSENCE OF BILATERAL BREASTS AND NIPPLES: Chronic | ICD-10-CM

## 2018-06-27 DIAGNOSIS — Z98.89 OTHER SPECIFIED POSTPROCEDURAL STATES: Chronic | ICD-10-CM

## 2018-06-27 DIAGNOSIS — Z98.890 OTHER SPECIFIED POSTPROCEDURAL STATES: Chronic | ICD-10-CM

## 2018-06-27 DIAGNOSIS — J38.7 OTHER DISEASES OF LARYNX: ICD-10-CM

## 2018-06-27 RX ORDER — OMEPRAZOLE 10 MG/1
1 CAPSULE, DELAYED RELEASE ORAL
Qty: 30 | Refills: 0
Start: 2018-06-27

## 2018-06-27 RX ORDER — FENTANYL CITRATE 50 UG/ML
50 INJECTION INTRAVENOUS
Qty: 0 | Refills: 0 | Status: DISCONTINUED | OUTPATIENT
Start: 2018-06-27 | End: 2018-06-27

## 2018-06-27 RX ORDER — OXYCODONE AND ACETAMINOPHEN 5; 325 MG/1; MG/1
1 TABLET ORAL EVERY 6 HOURS
Qty: 0 | Refills: 0 | Status: DISCONTINUED | OUTPATIENT
Start: 2018-06-27 | End: 2018-06-27

## 2018-06-27 RX ORDER — FENTANYL CITRATE 50 UG/ML
25 INJECTION INTRAVENOUS
Qty: 0 | Refills: 0 | Status: DISCONTINUED | OUTPATIENT
Start: 2018-06-27 | End: 2018-06-27

## 2018-06-27 RX ORDER — ONDANSETRON 8 MG/1
4 TABLET, FILM COATED ORAL ONCE
Qty: 0 | Refills: 0 | Status: DISCONTINUED | OUTPATIENT
Start: 2018-06-27 | End: 2018-06-27

## 2018-06-27 RX ORDER — OXYCODONE AND ACETAMINOPHEN 5; 325 MG/1; MG/1
1 TABLET ORAL
Qty: 8 | Refills: 0
Start: 2018-06-27

## 2018-06-27 RX ORDER — OXYCODONE AND ACETAMINOPHEN 5; 325 MG/1; MG/1
2 TABLET ORAL EVERY 6 HOURS
Qty: 0 | Refills: 0 | Status: DISCONTINUED | OUTPATIENT
Start: 2018-06-27 | End: 2018-06-27

## 2018-06-27 RX ADMIN — FENTANYL CITRATE 50 MICROGRAM(S): 50 INJECTION INTRAVENOUS at 14:50

## 2018-06-27 RX ADMIN — FENTANYL CITRATE 25 MICROGRAM(S): 50 INJECTION INTRAVENOUS at 15:45

## 2018-06-27 RX ADMIN — FENTANYL CITRATE 50 MICROGRAM(S): 50 INJECTION INTRAVENOUS at 14:35

## 2018-06-27 RX ADMIN — FENTANYL CITRATE 25 MICROGRAM(S): 50 INJECTION INTRAVENOUS at 16:00

## 2018-06-27 NOTE — ASU DISCHARGE PLAN (ADULT/PEDIATRIC). - NURSING INSTRUCTIONS
Notify MD for any new injuries to your teeth, lips or tongue, if you have trouble breathing or talking, or for pain not relieved with pain medications.  Cool and warm liquids that are not irritating to the throat should be given for the first day or two. Avoid hot liquids. Avoid citrus juices and milk. Advance at your own pace starting with soft foods and advancing to a regular diet. Avoid rough and scratchy foods and foods that are difficult to chew for approximately 5 days. Avoid strenuous exercise and blowing of nose. You were given 1000mg IV Tylenol for pain management.  Please DO NOT take any Tylenol containing products, such as  Vicodin, Percocet, Excedrin, many cold preparations for the next 6 hours (until 6:30 pm tonight _____).  DO NOT EXCEED 3000MG OF TYLENOL OVER 24 HOURS.  When taking pain meds - take with food and know it may cause constipation and nausea - Do NOT drive while on narcotics.

## 2018-06-27 NOTE — ASU DISCHARGE PLAN (ADULT/PEDIATRIC). - MEDICATION SUMMARY - MEDICATIONS TO TAKE
I will START or STAY ON the medications listed below when I get home from the hospital:    Percocet 5/325 oral tablet  -- 1 tab(s) by mouth every 6 hours, As Needed MDD:4-6 for pain  -- Caution federal law prohibits the transfer of this drug to any person other  than the person for whom it was prescribed.  May cause drowsiness.  Alcohol may intensify this effect.  Use care when operating dangerous machinery.  This prescription cannot be refilled.  This product contains acetaminophen.  Do not use  with any other product containing acetaminophen to prevent possible liver damage.  Using more of this medication than prescribed may cause serious breathing problems.    -- Indication: For pain med    celecoxib 100 mg oral capsule  -- Indication: For home med    clonazePAM 2 mg oral tablet  -- 1 tab(s) by mouth 3 times a day  -- Indication: For home med    gabapentin 300 mg oral capsule  -- 1 cap(s) by mouth 2 times a day  -- Indication: For home med    Allegra 180 mg oral tablet  -- 1 tab(s) by mouth once a day  -- Indication: For home med    Rexulti 2 mg oral tablet  -- 1 tab(s) by mouth once a day (at bedtime)  -- Indication: For home med    doxepin  -- 50 milligram(s) by mouth once a day (at bedtime)  -- Indication: For home med    ProAir HFA 90 mcg/inh inhalation aerosol  -- 2 puff(s) inhaled every 6 hours, As Needed  -- Indication: For home med    Mydayis  -- 37.5 mg by mouth once a day  -- Indication: For home med    Dymista 137 mcg-50 mcg/inh nasal spray  -- nasal once a day  -- Indication: For home med    omeprazole 40 mg oral delayed release capsule  -- 1 cap(s) by mouth once a day  -- Indication: For home med    buPROPion 300 mg/24 hours (XL) oral tablet, extended release  -- 1 tab(s) by mouth every 24 hours  -- Indication: For home med    levothyroxine 137 mcg (0.137 mg) oral tablet  -- 1 tab(s) by mouth once a day  -- Indication: For home med    multivitamin  -- 1 tab(s) by mouth once a day. last dsoe 5/15/2018  -- Indication: For home med I will START or STAY ON the medications listed below when I get home from the hospital:    Medrol Dosepak 4 mg oral tablet  -- 4 milligram(s) by mouth 2 times a day   -- It is very important that you take or use this exactly as directed.  Do not skip doses or discontinue unless directed by your doctor.  Obtain medical advice before taking any non-prescription drugs as some may affect the action of this medication.  Take with food or milk.    -- Indication: For post op steroids    celecoxib 100 mg oral capsule  -- Indication: For home med    Percocet 5/325 oral tablet  -- 1 tab(s) by mouth every 6 hours, As Needed MDD:4-6 for pain  -- Caution federal law prohibits the transfer of this drug to any person other  than the person for whom it was prescribed.  May cause drowsiness.  Alcohol may intensify this effect.  Use care when operating dangerous machinery.  This prescription cannot be refilled.  This product contains acetaminophen.  Do not use  with any other product containing acetaminophen to prevent possible liver damage.  Using more of this medication than prescribed may cause serious breathing problems.    -- Indication: For pain med    gabapentin 300 mg oral capsule  -- 1 cap(s) by mouth 2 times a day  -- Indication: For home med    clonazePAM 2 mg oral tablet  -- 1 tab(s) by mouth 3 times a day  -- Indication: For home med    Allegra 180 mg oral tablet  -- 1 tab(s) by mouth once a day  -- Indication: For home med    Rexulti 2 mg oral tablet  -- 1 tab(s) by mouth once a day (at bedtime)  -- Indication: For home med    doxepin  -- 50 milligram(s) by mouth once a day (at bedtime)  -- Indication: For home med    ProAir HFA 90 mcg/inh inhalation aerosol  -- 2 puff(s) inhaled every 6 hours, As Needed  -- Indication: For home med    Mydayis  -- 37.5 mg by mouth once a day  -- Indication: For home med    Dymista 137 mcg-50 mcg/inh nasal spray  -- nasal once a day  -- Indication: For home med    omeprazole 40 mg oral delayed release capsule  -- 1 cap(s) by mouth once a day  -- Indication: For home med    omeprazole 40 mg oral delayed release capsule  -- 1 cap(s) by mouth once a day   -- It is very important that you take or use this exactly as directed.  Do not skip doses or discontinue unless directed by your doctor.  Obtain medical advice before taking any non-prescription drugs as some may affect the action of this medication.  Swallow whole.  Do not crush.    -- Indication: For reflux medication     buPROPion 300 mg/24 hours (XL) oral tablet, extended release  -- 1 tab(s) by mouth every 24 hours  -- Indication: For home med    levothyroxine 137 mcg (0.137 mg) oral tablet  -- 1 tab(s) by mouth once a day  -- Indication: For home med    multivitamin  -- 1 tab(s) by mouth once a day. last dsoe 5/15/2018  -- Indication: For home med

## 2018-06-27 NOTE — ASU DISCHARGE PLAN (ADULT/PEDIATRIC). - COMMENTS
may also call Recovery Room (PACU) 24/7 @ [phone]  may also call Recovery Room (PACU) 24/7 @ [phone]

## 2018-06-27 NOTE — ASU DISCHARGE PLAN (ADULT/PEDIATRIC). - NOTIFY
Bleeding that does not stop/Fever greater than 101/Inability to Tolerate Liquids or Foods/Swelling that continues/Persistent Nausea and Vomiting/Pain not relieved by Medications Pain not relieved by Medications/Fever greater than 101/Increased Irritability or Sluggishness/Persistent Nausea and Vomiting/Inability to Tolerate Liquids or Foods/Bleeding that does not stop/Swelling that continues/Unable to Urinate

## 2018-07-12 ENCOUNTER — APPOINTMENT (OUTPATIENT)
Dept: OTOLARYNGOLOGY | Facility: CLINIC | Age: 68
End: 2018-07-12
Payer: MEDICARE

## 2018-07-12 VITALS
SYSTOLIC BLOOD PRESSURE: 145 MMHG | WEIGHT: 180 LBS | BODY MASS INDEX: 30.73 KG/M2 | HEART RATE: 105 BPM | DIASTOLIC BLOOD PRESSURE: 82 MMHG | HEIGHT: 64 IN

## 2018-07-12 PROCEDURE — 31575 DIAGNOSTIC LARYNGOSCOPY: CPT

## 2018-07-12 PROCEDURE — 99213 OFFICE O/P EST LOW 20 MIN: CPT | Mod: 25

## 2018-07-17 PROBLEM — K57.92 DIVERTICULITIS OF INTESTINE, PART UNSPECIFIED, WITHOUT PERFORATION OR ABSCESS WITHOUT BLEEDING: Chronic | Status: ACTIVE | Noted: 2018-02-23

## 2018-07-23 ENCOUNTER — OTHER (OUTPATIENT)
Age: 68
End: 2018-07-23

## 2018-07-25 PROBLEM — K21.9 GASTRO-ESOPHAGEAL REFLUX DISEASE WITHOUT ESOPHAGITIS: Chronic | Status: ACTIVE | Noted: 2018-05-29

## 2018-08-02 ENCOUNTER — APPOINTMENT (OUTPATIENT)
Dept: OTOLARYNGOLOGY | Facility: CLINIC | Age: 68
End: 2018-08-02
Payer: MEDICARE

## 2018-08-02 VITALS
SYSTOLIC BLOOD PRESSURE: 154 MMHG | HEIGHT: 64 IN | WEIGHT: 175 LBS | DIASTOLIC BLOOD PRESSURE: 85 MMHG | BODY MASS INDEX: 29.88 KG/M2

## 2018-08-02 DIAGNOSIS — R05 COUGH: ICD-10-CM

## 2018-08-02 PROCEDURE — 99214 OFFICE O/P EST MOD 30 MIN: CPT

## 2018-08-05 ENCOUNTER — FORM ENCOUNTER (OUTPATIENT)
Age: 68
End: 2018-08-05

## 2018-08-06 ENCOUNTER — APPOINTMENT (OUTPATIENT)
Dept: RADIOLOGY | Facility: CLINIC | Age: 68
End: 2018-08-06

## 2018-08-06 ENCOUNTER — OUTPATIENT (OUTPATIENT)
Dept: OUTPATIENT SERVICES | Facility: HOSPITAL | Age: 68
LOS: 1 days | End: 2018-08-06
Payer: MEDICARE

## 2018-08-06 DIAGNOSIS — Z98.89 OTHER SPECIFIED POSTPROCEDURAL STATES: Chronic | ICD-10-CM

## 2018-08-06 DIAGNOSIS — Z90.13 ACQUIRED ABSENCE OF BILATERAL BREASTS AND NIPPLES: Chronic | ICD-10-CM

## 2018-08-06 DIAGNOSIS — Z00.8 ENCOUNTER FOR OTHER GENERAL EXAMINATION: ICD-10-CM

## 2018-08-06 DIAGNOSIS — Z98.890 OTHER SPECIFIED POSTPROCEDURAL STATES: Chronic | ICD-10-CM

## 2018-08-06 PROCEDURE — 71046 X-RAY EXAM CHEST 2 VIEWS: CPT | Mod: 26

## 2018-08-06 PROCEDURE — 71046 X-RAY EXAM CHEST 2 VIEWS: CPT

## 2018-08-20 ENCOUNTER — RESULT REVIEW (OUTPATIENT)
Age: 68
End: 2018-08-20

## 2018-08-30 ENCOUNTER — APPOINTMENT (OUTPATIENT)
Dept: RADIATION ONCOLOGY | Facility: CLINIC | Age: 68
End: 2018-08-30
Payer: MEDICARE

## 2018-08-30 ENCOUNTER — APPOINTMENT (OUTPATIENT)
Dept: OTOLARYNGOLOGY | Facility: CLINIC | Age: 68
End: 2018-08-30

## 2018-08-30 VITALS
BODY MASS INDEX: 31.45 KG/M2 | WEIGHT: 184.19 LBS | TEMPERATURE: 98.5 F | DIASTOLIC BLOOD PRESSURE: 90 MMHG | RESPIRATION RATE: 15 BRPM | HEIGHT: 64 IN | HEART RATE: 112 BPM | OXYGEN SATURATION: 97 % | SYSTOLIC BLOOD PRESSURE: 141 MMHG

## 2018-08-30 PROCEDURE — 99213 OFFICE O/P EST LOW 20 MIN: CPT

## 2018-09-06 ENCOUNTER — INBOUND DOCUMENT (OUTPATIENT)
Age: 68
End: 2018-09-06

## 2018-12-19 ENCOUNTER — INBOUND DOCUMENT (OUTPATIENT)
Age: 68
End: 2018-12-19

## 2019-01-10 ENCOUNTER — APPOINTMENT (OUTPATIENT)
Dept: RADIATION ONCOLOGY | Facility: CLINIC | Age: 69
End: 2019-01-10

## 2019-03-29 ENCOUNTER — APPOINTMENT (OUTPATIENT)
Dept: PHYSICAL MEDICINE AND REHAB | Facility: CLINIC | Age: 69
End: 2019-03-29
Payer: MEDICARE

## 2019-03-29 VITALS — SYSTOLIC BLOOD PRESSURE: 154 MMHG | HEART RATE: 97 BPM | DIASTOLIC BLOOD PRESSURE: 86 MMHG | OXYGEN SATURATION: 97 %

## 2019-03-29 DIAGNOSIS — I89.0 LYMPHEDEMA, NOT ELSEWHERE CLASSIFIED: ICD-10-CM

## 2019-03-29 PROCEDURE — 99212 OFFICE O/P EST SF 10 MIN: CPT

## 2019-04-01 ENCOUNTER — INBOUND DOCUMENT (OUTPATIENT)
Age: 69
End: 2019-04-01

## 2019-04-01 NOTE — HISTORY OF PRESENT ILLNESS
[FreeTextEntry1] : 66 y.o with H/o breast cancer left breast 2002 with recurrence  2005, 2011, b/l mastectomy and 6reconstruction 2011 with 1 LN removed b/l , s/p radiation 2005. No chemo. \par Laryngeal cancer Oct 2015, s/p radiation, no chemo.\par Follow up for lymphedema, reports stable,does not  wear compression sleeve unless flyng. no increase in swelling/pain. Hasn't started exercising due to fatigue.

## 2019-04-01 NOTE — PHYSICAL EXAM
[FreeTextEntry1] : General Appearance: Well developed, well nourished, in no acute distress.\par Chest: Normal chest expansion. Breast incisions are clean, dry and intact. No seromas or neuromas are noted. \par Axilla: No masses are noted. No axillary cording is noted. \par Abdomen: Soft and non-tender with normal bowel sounds.\par Musculoskeletal: Normal range of motion of bilateral shoulders. Negative impingement tests.\par Extremities: 2cm difference above elbow between L and R upper extremities. \par Neurologic: The patient has normal muscle strength in bilateral upper and lower extremities. \par

## 2019-06-04 ENCOUNTER — APPOINTMENT (OUTPATIENT)
Dept: SURGICAL ONCOLOGY | Facility: CLINIC | Age: 69
End: 2019-06-04

## 2019-06-18 NOTE — REASON FOR VISIT
[Routine Follow-Up] : routine follow-up visit for [Head and Neck Cancer] : head and neck cancer [T: ___] : T[unfilled]

## 2019-06-20 ENCOUNTER — APPOINTMENT (OUTPATIENT)
Dept: RADIATION ONCOLOGY | Facility: CLINIC | Age: 69
End: 2019-06-20
Payer: MEDICARE

## 2019-06-20 VITALS
DIASTOLIC BLOOD PRESSURE: 83 MMHG | SYSTOLIC BLOOD PRESSURE: 146 MMHG | WEIGHT: 177.91 LBS | HEART RATE: 85 BPM | BODY MASS INDEX: 30.54 KG/M2 | RESPIRATION RATE: 16 BRPM | OXYGEN SATURATION: 98 %

## 2019-06-20 DIAGNOSIS — C32.0 MALIGNANT NEOPLASM OF GLOTTIS: ICD-10-CM

## 2019-06-20 PROCEDURE — 99213 OFFICE O/P EST LOW 20 MIN: CPT

## 2019-06-20 NOTE — PROCEDURE
[Surveillance] : monitor for disease recurrence [Topical Lidocaine] : topical lidocaine [Flexible Endoscope] : examined with the flexible endoscope [Normal] : normal base of the tongue [de-identified] : laryngeal lesion with mild candidiasis. No nodule. [de-identified] : mild mucosal bleeding in right nostril

## 2019-06-20 NOTE — REVIEW OF SYSTEMS
[Patient Intake Form Reviewed] : Patient intake form was reviewed [Dysphagia] : dysphagia [Cough] : cough [Negative] : Allergic/Immunologic [Fatigue: Grade 0] : Fatigue: Grade 0 [Mucositis Oral: Grade 0] : Mucositis Oral: Grade 0  [Xerostomia: Grade 0] : Xerostomia: Grade 0 [Oral Pain: Grade 0] : Oral Pain: Grade 0 [Salivary duct inflammation: Grade 0] : Salivary duct inflammation: Grade 0 [Dysgeusia: Grade 0] : Dysgeusia: Grade 0 [Cough: Grade 1 - Mild symptoms; nonprescription intervention indicated] : Cough: Grade 1 - Mild symptoms; nonprescription intervention indicated [Voice Alteration: Grade 1 - Mild or intermittent change from normal voice] : Voice Alteration: Grade 1 - Mild or intermittent change from normal voice [Loss of Hearing] : no loss of hearing [Nosebleeds] : no nosebleeds [Hearing Disturbances] : no hearing disturbances [SOB on Exertion] : no shortness of breath during exertion [Shortness Of Breath] : no shortness of breath [FreeTextEntry4] : Hoarseness [Hoarseness: Grade 2 - Moderate or persistent voice changes; may require occasional repetition but understandable on telephone; medical evaluation indicated] : Hoarseness: Grade 2 - Moderate or persistent voice changes; may require occasional repetition but understandable on telephone; medical evaluation indicated

## 2019-06-20 NOTE — HISTORY OF PRESENT ILLNESS
[FreeTextEntry1] : Ms. 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 She is s/p EBRT to 63 Gy to the glottic region completing treatment on 12/30/15\par \par She was last seen on 3/8/18 when a left vocal cord lesion was identified. \par \par On 6/27/18, she underwent microlaryngoscopy and removal of the lesion. Since it was in a critical area, where scar tissue can compromise the swallowing, voice and airway, and since the initial biopsy showed verrucous hyperplasia, the plan was to remove the lesion without aggressive deep margin control and wait for the final path. The path report showed severe dysplasia/Cis which was present in the deep margin and her case was presented at tumor board. The recommendation was for observation and intervention in case of suspicious lesions. Ms. Shah has discussed this with Dr. Virk who last saw her on 8/2/18 and will follow up again on 8/30/19. \par \par She also has a history of left breast cancer that was diagnosed in 2002 with a recurrence in 2005 for which she received XRT and has a bilateral mastectomy in 2011. She is following Dr. Hernandez for annual surveillance and was last seen in May 2018 with no issues. \par \par She report stable voice hoarseness, coughing up phlegm, and dry mouth. Just finished a course of antifungals for laryngitis.  She followed up with Dr. Mattson Med. onc. in March 2019 at [facility]- no additional therapy offered.

## 2019-06-20 NOTE — LETTER GREETING
[Dear  ___] : Dear  [unfilled], [Please see my note below.] : Please see my note below. [FreeTextEntry2] : Phil Perlman

## 2019-06-20 NOTE — DISEASE MANAGEMENT
[Clinical] : TNM Stage: c [I] : I [FreeTextEntry4] : new TIS of the larynx [MTNM] : 0 [TTNM] : 1 [NTNM] : 0

## 2019-10-17 ENCOUNTER — INBOUND DOCUMENT (OUTPATIENT)
Age: 69
End: 2019-10-17

## 2019-10-21 ENCOUNTER — INBOUND DOCUMENT (OUTPATIENT)
Age: 69
End: 2019-10-21

## 2019-11-13 ENCOUNTER — INBOUND DOCUMENT (OUTPATIENT)
Age: 69
End: 2019-11-13

## 2019-12-03 ENCOUNTER — RESULT REVIEW (OUTPATIENT)
Age: 69
End: 2019-12-03

## 2019-12-23 ENCOUNTER — INBOUND DOCUMENT (OUTPATIENT)
Age: 69
End: 2019-12-23

## 2020-01-02 ENCOUNTER — INBOUND DOCUMENT (OUTPATIENT)
Age: 70
End: 2020-01-02

## 2020-01-16 ENCOUNTER — APPOINTMENT (OUTPATIENT)
Dept: RADIATION ONCOLOGY | Facility: CLINIC | Age: 70
End: 2020-01-16

## 2020-09-02 ENCOUNTER — APPOINTMENT (OUTPATIENT)
Dept: GASTROENTEROLOGY | Facility: CLINIC | Age: 70
End: 2020-09-02
Payer: MEDICARE

## 2020-09-02 VITALS
HEART RATE: 83 BPM | OXYGEN SATURATION: 98 % | BODY MASS INDEX: 27.66 KG/M2 | HEIGHT: 64 IN | SYSTOLIC BLOOD PRESSURE: 142 MMHG | WEIGHT: 162 LBS | DIASTOLIC BLOOD PRESSURE: 83 MMHG

## 2020-09-02 DIAGNOSIS — Z12.11 ENCOUNTER FOR SCREENING FOR MALIGNANT NEOPLASM OF COLON: ICD-10-CM

## 2020-09-02 DIAGNOSIS — K59.09 OTHER CONSTIPATION: ICD-10-CM

## 2020-09-02 PROCEDURE — 99203 OFFICE O/P NEW LOW 30 MIN: CPT

## 2020-09-02 RX ORDER — SODIUM SULFATE, POTASSIUM SULFATE, MAGNESIUM SULFATE 17.5; 3.13; 1.6 G/ML; G/ML; G/ML
17.5-3.13-1.6 SOLUTION, CONCENTRATE ORAL
Qty: 1 | Refills: 0 | Status: ACTIVE | COMMUNITY
Start: 2020-09-02 | End: 1900-01-01

## 2020-09-02 NOTE — REASON FOR VISIT
RADIOLOGY PROCEDURE NOTE  Patient name: Chaz Soler  MRN: [id number]  : 1926    Pre-procedure diagnosis: Back pain  Post-procedure diagnosis: Same    Procedure Date/Time: 2017  2:39 PM  Procedure: Caudal FRANCK  Estimated blood loss: None  Specimen(s) collected with description: none  The patient tolerated the procedure well with no immediate complications.  Significant findings:none    See imaging dictation for procedural details.    Provider name: Tyrell Dumont  Assistant(s):None       [Spouse] : spouse [Initial Evaluation] : an initial evaluation [FreeTextEntry1] : [facility] for colon cancer screening in a patient with history of cancer of the larynx with laryngectomy and the Rob tube in place

## 2020-09-02 NOTE — PHYSICAL EXAM
[General Appearance - In No Acute Distress] : in no acute distress [General Appearance - Well Nourished] : well nourished [General Appearance - Alert] : alert [General Appearance - Well Developed] : well developed [General Appearance - Well-Appearing] : healthy appearing [Sclera] : the sclera and conjunctiva were normal [Neck Cervical Mass (___cm)] : no neck mass was observed [Jugular Venous Distention Increased] : there was no jugular-venous distention [FreeTextEntry1] : See above [Auscultation Breath Sounds / Voice Sounds] : lungs were clear to auscultation bilaterally [Apical Impulse] : the apical impulse was normal [Edema] : there was no peripheral edema [Full Pulse] : the pedal pulses are present [Bowel Sounds] : normal bowel sounds [Abdomen Soft] : soft [Abdomen Mass (___ Cm)] : no abdominal mass palpated [Abdomen Tenderness] : non-tender [Cervical Lymph Nodes Enlarged Posterior Bilaterally] : posterior cervical [Cervical Lymph Nodes Enlarged Anterior Bilaterally] : anterior cervical [Femoral Lymph Nodes Enlarged Bilaterally] : femoral [Axillary Lymph Nodes Enlarged Bilaterally] : axillary [Supraclavicular Lymph Nodes Enlarged Bilaterally] : supraclavicular [No CVA Tenderness] : no ~M costovertebral angle tenderness [Inguinal Lymph Nodes Enlarged Bilaterally] : inguinal [Abnormal Walk] : normal gait [No Spinal Tenderness] : no spinal tenderness [Nail Clubbing] : no clubbing  or cyanosis of the fingernails [Musculoskeletal - Swelling] : no joint swelling seen [Skin Color & Pigmentation] : normal skin color and pigmentation [Motor Tone] : muscle strength and tone were normal [Skin Turgor] : normal skin turgor [] : no rash [No Focal Deficits] : no focal deficits [Oriented To Time, Place, And Person] : oriented to person, place, and time [Impaired Insight] : insight and judgment were intact [Affect] : the affect was normal

## 2020-09-02 NOTE — CONSULT LETTER
[Dear  ___] : Dear  [unfilled], [Consult Letter:] : I had the pleasure of evaluating your patient, [unfilled]. [Please see my note below.] : Please see my note below. [Consult Closing:] : Thank you very much for allowing me to participate in the care of this patient.  If you have any questions, please do not hesitate to contact me. [Sincerely,] : Sincerely, [FreeTextEntry3] : Sushant Cueva MD\par  [FreeTextEntry2] : Zane barron MD\par [address] [place] [address]\[place] [address]\par

## 2020-09-02 NOTE — HISTORY OF PRESENT ILLNESS
[de-identified] : Dr. Zane barron takes care of this very pleasant 69-year-old female\par \par She has history of laryngeal carcinoma with laryngectomy and radiation and a Rob tube in place through which she is able to speak\par \par She is here to simply schedule a routine screening colonoscopy\par \par She has mild chronic constipation and takes Senokot periodically with good results\par \par No blood per rectum\par \par No abdominal pain, pelvic pain or anal rectal pain\par \par No family history of colon cancer\par \par No difficulty eating or drinking and her weight is stable

## 2020-09-02 NOTE — ASSESSMENT
[FreeTextEntry1] : Impression\par \par Request for colon cancer screening\par \par Status post laryngeal carcinoma with laryngectomy and radiation and Rob tube in place and doing very well\par \par Suggest\par \par Agree with colonoscopy\par \par I spoke with 1 of the hospital anesthesiologist to suggest the patient have presurgical testing and that 1 of the anesthesiologists will evaluate a Rob tube and decide if it safe to proceed with colonoscopy in a hospital setting\par \par Suprep\par \par The laxative, or its risks benefits and alternatives have been thoroughly reviewed with the patient in great detail. The laxative instructions have been reviewed in great detail with the patient.\par \par Risks/benefits:\par The procedure, the risks and benefits and alternatives have been reviewed in great detail with the patient.  Risks including, but not limited to sedation such as cardiac and pulmonary compromise, the procedure itself such as bleeding requiring hospitalization, transfusion, surgery, temporary or permanent colostomy.  Perforation or puncture of the requiring hospitalization, surgery, temporary colostomy.\par It has been explained to the patient that though colonoscopy is thought to be the best screening exam for colon cancer and polyps, no screening exam can find all colon polyps or cancers.  \par The patient expresses understanding of the procedure and consents to undergoing the procedure.\par

## 2020-09-10 ENCOUNTER — APPOINTMENT (OUTPATIENT)
Dept: GASTROENTEROLOGY | Facility: CLINIC | Age: 70
End: 2020-09-10

## 2020-12-01 ENCOUNTER — APPOINTMENT (OUTPATIENT)
Dept: SURGICAL ONCOLOGY | Facility: CLINIC | Age: 70
End: 2020-12-01

## 2020-12-01 DIAGNOSIS — C50.919 MALIGNANT NEOPLASM OF UNSPECIFIED SITE OF UNSPECIFIED FEMALE BREAST: ICD-10-CM

## 2020-12-08 ENCOUNTER — APPOINTMENT (OUTPATIENT)
Dept: GASTROENTEROLOGY | Facility: HOSPITAL | Age: 70
End: 2020-12-08

## 2020-12-23 PROBLEM — Z12.11 ENCOUNTER FOR SCREENING COLONOSCOPY: Status: RESOLVED | Noted: 2020-09-02 | Resolved: 2020-12-23

## 2021-04-08 ENCOUNTER — APPOINTMENT (OUTPATIENT)
Dept: ORTHOPEDIC SURGERY | Facility: CLINIC | Age: 71
End: 2021-04-08
Payer: MEDICARE

## 2021-04-08 VITALS — WEIGHT: 157 LBS | HEIGHT: 62 IN | BODY MASS INDEX: 28.89 KG/M2

## 2021-04-08 DIAGNOSIS — S62.663A NONDISPLACED FRACTURE OF DISTAL PHALANX OF LEFT MIDDLE FINGER, INITIAL ENCOUNTER FOR CLOSED FRACTURE: ICD-10-CM

## 2021-04-08 DIAGNOSIS — M79.645 PAIN IN LEFT FINGER(S): ICD-10-CM

## 2021-04-08 PROCEDURE — 73140 X-RAY EXAM OF FINGER(S): CPT | Mod: F2

## 2021-04-08 PROCEDURE — 99203 OFFICE O/P NEW LOW 30 MIN: CPT

## 2021-04-08 NOTE — ADDENDUM
[FreeTextEntry1] : I, Nilesh Moreno wrote this note acting as a scribe for Dr. Yadiel Park MD on Apr 08, 2021.

## 2021-04-08 NOTE — END OF VISIT
[FreeTextEntry3] : I, Yadiel Park MD, ordering physician, have read and attest that all the information, medical decision making and discharge instructions within are true and accurate on 04/08/2021.

## 2021-04-08 NOTE — DISCUSSION/SUMMARY
[FreeTextEntry1] : The underlying pathophysiology was reviewed with the patient. XR films were reviewed with the patient. Discussed at length the nature of the patient’s condition.\par \par Treatment options were discussed including; observation, NSAIDS, analgesics and bracing.\par \par Advised to soak in warm water.\par Splint applied today, advised to use splint only while exercising. \par \par Patient can continue activities as tolerated. All questions answered, understanding verbalized. Patient in agreement with plan of care. Follow up in 4 weeks if needed.\par

## 2021-04-08 NOTE — HISTORY OF PRESENT ILLNESS
[Right] : right hand dominant [FreeTextEntry1] : DEEPTHI RIBEIRO is a 70 year female RHD who presents for initial evaluation of left long finger pain and swelling.  She hit her finger on a Pilates machine yesterday.  It was painful but she did not go to the ED or Urgent Care. The swelling and ecchymosis progressed overnight.

## 2021-04-08 NOTE — PHYSICAL EXAM
[de-identified] : Patient is WDWN, alert, and in no acute distress. Breathing is unlabored. She is grossly oriented to person, place, and time. \par \par Left Hand:\par Inspection: Ecchymosis along long finger distal phalanx fracture\par Diffuse  tenderness to palpation\par Able to fully close and open hand [de-identified] : AP, lateral and oblique views of the left hand were obtained today and revealed a nondisplaced fracture of the dorsal aspect of the distal phalanx\par

## 2021-11-17 NOTE — ED PROVIDER NOTE - CONSTITUTIONAL, MLM
normal... Well appearing, well nourished, awake, alert, oriented to person, place, time/situation and in moderate distress. independent

## 2021-12-28 ENCOUNTER — APPOINTMENT (OUTPATIENT)
Dept: ULTRASOUND IMAGING | Facility: CLINIC | Age: 71
End: 2021-12-28
Payer: MEDICARE

## 2021-12-28 PROCEDURE — 93970 EXTREMITY STUDY: CPT

## 2022-02-07 ENCOUNTER — APPOINTMENT (OUTPATIENT)
Dept: RADIOLOGY | Facility: CLINIC | Age: 72
End: 2022-02-07
Payer: MEDICARE

## 2022-02-07 ENCOUNTER — APPOINTMENT (OUTPATIENT)
Dept: ULTRASOUND IMAGING | Facility: CLINIC | Age: 72
End: 2022-02-07

## 2022-02-07 PROCEDURE — 73030 X-RAY EXAM OF SHOULDER: CPT | Mod: RT

## 2022-02-07 PROCEDURE — 71046 X-RAY EXAM CHEST 2 VIEWS: CPT

## 2022-02-07 PROCEDURE — 71100 X-RAY EXAM RIBS UNI 2 VIEWS: CPT | Mod: RT

## 2022-02-07 PROCEDURE — 73070 X-RAY EXAM OF ELBOW: CPT | Mod: RT

## 2022-03-07 ENCOUNTER — APPOINTMENT (OUTPATIENT)
Dept: ULTRASOUND IMAGING | Facility: CLINIC | Age: 72
End: 2022-03-07
Payer: MEDICARE

## 2022-03-07 PROCEDURE — 76770 US EXAM ABDO BACK WALL COMP: CPT

## 2022-03-27 NOTE — ASU PREOP CHECKLIST - WAS PATIENT ON BETA BLOCKER?
Hospital Medicine History & Physical Note    Date of Service  3/27/2022    Primary Care Physician  Errol Moralez M.D.    Consultants  None     Code Status  Full Code    Chief Complaint  Chief Complaint   Patient presents with   • Chest Pain     Chest pain started Thursday. Has had URI and 2 covid tests were negative     History of Presenting Illness  Odalys Ballard is a 51 y.o. female with medical history of hypertension who presented 3/27/2022 with this pain.  Pain is on the left side of the chest wall described as squeezing, stabbing in nature.  Worse with deep breathing.  Worse with movement.  Rated as moderate-severe.  Also has shortness of breath and cough.  Having lower extremity pain redness or swelling.  Patient's daughter recently diagnosed with an upper respiratory tract infection.    I discussed the plan of care with emergency department physician, the patient and patient  who was present at bedside.    Review of Systems  Review of Systems   Constitutional: Negative for chills and fever.   Eyes: Negative for discharge and redness.   Respiratory: Positive for cough and shortness of breath. Negative for stridor.    Cardiovascular: Positive for chest pain. Negative for leg swelling.   Gastrointestinal: Negative for abdominal pain and vomiting.   Genitourinary: Negative for flank pain.   Musculoskeletal: Negative for myalgias.   Skin: Negative.    Neurological: Negative for focal weakness.   Endo/Heme/Allergies: Does not bruise/bleed easily.   Psychiatric/Behavioral: The patient is not nervous/anxious.      Past Medical History   has a past medical history of Anesthesia, ASTHMA, At high risk for breast cancer- prophylactic bilateral mastectomy TBD November 2020 (10/1/2020), Bronchitis (2015), Heart burn, Hiatus hernia syndrome, Hypertension, Other specified symptom associated with female genital organs, and Pneumonia (2010).    Surgical History   has a past surgical history that includes  cholecystectomy (2009); laparoscopy (2000, 2008); pelviscopy (9/6/2013); hysterectomy robotic (N/A, 10/17/2016); cystoscopy (N/A, 10/17/2016); pr mastectomy, simple, complete (Bilateral, 11/5/2020); breast reconstruction (Bilateral, 11/5/2020); tissue expander place/remove (Bilateral, 11/5/2020); breast reconstruction (Bilateral, 2/11/2021); excision,fat graft (2/11/2021); tissue expander place/remove (Bilateral, 2/11/2021); breast implant revision (Bilateral, 2/11/2021); and liposuction (2/11/2021).     Family History  family history includes Cancer in an other family member; Diabetes in an other family member; Heart Disease in an other family member; Hypertension in an other family member.      Social History   reports that she has never smoked. She has never used smokeless tobacco. She reports previous alcohol use. She reports that she does not use drugs.    Allergies  Allergies   Allergen Reactions   • Lisinopril Cough   • Losartan Cough     Per pt reports that she had bad congestion      Medications  Prior to Admission Medications   Prescriptions Last Dose Informant Patient Reported? Taking?   Eszopiclone 3 MG Tab 3/26/2022 at 2200 Patient Yes No   Sig: Take 3 mg by mouth every evening.   Phenylephrine HCl (AFRIN ALLERGY NA) 3/26/2022 at 1800 Patient Yes Yes   Sig: Administer 1-2 Sprays into affected nostril(S) 2 times a day as needed (For congestion).   Pseudoephedrine-DM-GG (ROBITUSSIN CF PO) 3/27/2022 at 0200 Patient Yes Yes   Sig: Take 20 mL by mouth 2 times a day as needed (For cough).   albuterol 108 (90 Base) MCG/ACT Aero Soln inhalation aerosol 3/27/2022 at 0200 Patient No No   Sig: Inhale 2 Puffs by mouth every 6 hours as needed for Shortness of Breath.   aspirin EC (ECOTRIN) 81 MG Tablet Delayed Response 3/26/2022 at 2200 Patient Yes No   Sig: Take 81 mg by mouth every evening.   azithromycin (ZITHROMAX) 250 MG Tab 3/24/2022 at FINISHED Patient Yes Yes   Sig: Take 250 mg by mouth every day. Pt started  on 3/22/2022 for 3 day course   baclofen (LIORESAL) 10 MG Tab 3/27/2022 at 0200 Patient No No   Sig: TAKE 1 TABLET BY MOUTH ONCE DAILY AS NEEDED   Patient taking differently: Take 10 mg by mouth 2 times a day as needed. Indications: Muscle Spasm, Muscle Spasticity   losartan (COZAAR) 50 MG Tab 3/10/2022 at STOPPED Patient No No   Sig: Take 1 Tablet by mouth every day.   methylPREDNISolone (MEDROL DOSEPAK) 4 MG Tablet Therapy Pack 3/26/2022 at 1600 Patient Yes Yes   Sig: Take 4 mg by mouth every day. Follow schedule on package instructions.  Pt started on 3/23/2022 for 6 day course   rizatriptan (MAXALT) 10 MG tablet > 1 week at Unknown Patient No No   Sig: TAKE 1 TABLET BY MOUTH ONCE DAILY AS NEEDED FOR MIGRAINE HEADACHE   Patient taking differently: Take 10 mg by mouth one time as needed for Migraine.      Facility-Administered Medications: None     Physical Exam  Temp:  [37 °C (98.6 °F)] 37 °C (98.6 °F)  Pulse:  [] 92  Resp:  [19-53] 53  BP: (138-220)/() 138/86  SpO2:  [91 %-100 %] 91 %  Blood Pressure: 138/86   Temperature: 37 °C (98.6 °F)   Pulse: 92   Respiration: (!) 53   Pulse Oximetry: 91 %     Physical Exam  Constitutional:       General: She is not in acute distress.  HENT:      Head: Normocephalic and atraumatic.      Right Ear: External ear normal.      Left Ear: External ear normal.      Nose: No congestion or rhinorrhea.      Mouth/Throat:      Mouth: Mucous membranes are moist.      Pharynx: No oropharyngeal exudate or posterior oropharyngeal erythema.   Eyes:      General: No scleral icterus.        Right eye: No discharge.         Left eye: No discharge.      Conjunctiva/sclera: Conjunctivae normal.      Pupils: Pupils are equal, round, and reactive to light.   Cardiovascular:      Heart sounds:     No friction rub. No gallop.   Pulmonary:      Effort: Pulmonary effort is normal.   Abdominal:      General: Abdomen is flat. There is no distension.      Tenderness: There is no guarding.    Musculoskeletal:         General: No swelling.      Cervical back: Neck supple. No rigidity. No muscular tenderness.      Right lower leg: No edema.      Left lower leg: No edema.   Skin:     Capillary Refill: Capillary refill takes 2 to 3 seconds.      Coloration: Skin is not jaundiced or pale.      Findings: No bruising or erythema.   Neurological:      Mental Status: She is alert and oriented to person, place, and time.   Psychiatric:         Mood and Affect: Mood normal.         Judgment: Judgment normal.       Laboratory:  Recent Labs     03/27/22  1100   WBC 9.4   RBC 4.65   HEMOGLOBIN 14.7   HEMATOCRIT 43.5   MCV 93.5   MCH 31.6   MCHC 33.8   RDW 39.3   PLATELETCT 292   MPV 9.5     Recent Labs     03/27/22  1100   SODIUM 138   POTASSIUM 3.4*   CHLORIDE 99   CO2 24   GLUCOSE 118*   BUN 23*   CREATININE 0.76   CALCIUM 10.0     Recent Labs     03/27/22  1100   ALTSGPT 24   ASTSGOT 22   ALKPHOSPHAT 74   TBILIRUBIN 0.3   GLUCOSE 118*         No results for input(s): NTPROBNP in the last 72 hours.      Recent Labs     03/27/22  1100   TROPONINT <6       Imaging:  CT-CTA CHEST PULMONARY ARTERY W/ RECONS   Final Result      1.  No central or segmental pulmonary embolus is identified.   2.  No acute cardiopulmonary process is seen.                 I personally reviewed patient's EKG it shows sinus tachycardia with a rate of 95, there is T wave inversion and ST depression in leads I, II, III, aVF, leads V5-6.  Is also associated high voltage in those leads consistent with .    Assessment/Plan:  I anticipate this patient is appropriate for observation status at this time.    * Chest pain- (present on admission)  Assessment & Plan  EKG, sinus tachycardia with a rate of 95, there is T wave inversion and ST depression in leads I, II, III, aVF, leads V5-6.  Is also associated high voltage in those leads consistent with .  Initial Troponin not elevated. I will trend  Stat EKG and troponin for recurrence of  chest pain.   Continuous cardiac monitoring.  Will check an Echo   Chest pain is likely 2/2 pleurisy from RSV  NSAID    Hypertensive urgency- (present on admission)  Assessment & Plan  Allergic to ACI  I will start metoprolol and spirolactone   I will start Hydralazine and labetalol for extreme hypertension    Abnormal EKG- (present on admission)  Assessment & Plan  EKG, sinus tachycardia with a rate of 95, there is T wave inversion and ST depression in leads I, II, III, aVF, leads V5-6.  Is also associated high voltage in those leads consistent with .  LVH likely secondary to untreated hypertension.  Starting metoprolol and spironolactone, allergic to ACI/ARBS  Initial Troponin not elevated. I will trend  Will check an Echo     RSV (respiratory syncytial virus infection)- (present on admission)  Assessment & Plan  With a reactive airway disease.  I will start a course of Prednisone  Oxygen as needed, Respiratory protocol, Bronchodilators, Incentive spirometry    Shortness of breath- (present on admission)  Assessment & Plan  Likely multifactorial, RSV infection with a reactive airway disease.  Hypertensive urgency, will check echo to rule out heart failure  I will start a course of Prednisone  Oxygen as needed, Respiratory protocol, Bronchodilators, Incentive spirometry    VTE prophylaxis: SCDs/TEDs and enoxaparin ppx   No

## 2022-07-14 ENCOUNTER — APPOINTMENT (OUTPATIENT)
Dept: ORTHOPEDIC SURGERY | Facility: CLINIC | Age: 72
End: 2022-07-14

## 2022-07-14 DIAGNOSIS — Z00.00 ENCOUNTER FOR GENERAL ADULT MEDICAL EXAMINATION W/OUT ABNORMAL FINDINGS: ICD-10-CM

## 2022-07-14 DIAGNOSIS — M81.0 AGE-RELATED OSTEOPOROSIS W/OUT CURRENT PATHOLOGICAL FRACTURE: ICD-10-CM

## 2022-07-14 DIAGNOSIS — M84.30XA STRESS FRACTURE, UNSPECIFIED SITE, INITIAL ENCOUNTER FOR FRACTURE: ICD-10-CM

## 2022-07-14 PROCEDURE — 99203 OFFICE O/P NEW LOW 30 MIN: CPT

## 2022-07-14 PROCEDURE — 73630 X-RAY EXAM OF FOOT: CPT | Mod: 50

## 2022-07-14 PROCEDURE — 73600 X-RAY EXAM OF ANKLE: CPT | Mod: 50

## 2022-07-14 NOTE — IMAGING
[Bilateral] : ankle bilaterally [There are no fractures, subluxations or dislocations. No significant abnormalities are seen] : There are no fractures, subluxations or dislocations. No significant abnormalities are seen [Weight -] : weightbearing [Degenerative change] : Degenerative change [FreeTextEntry9] : R sub malleolar calcifications chronic appearing [de-identified] : midfoot dorsal spur

## 2022-07-14 NOTE — HISTORY OF PRESENT ILLNESS
[10] : 10 [5] : 5 [Localized] : localized [Stabbing] : stabbing [Household chores] : household chores [Sudden] : sudden [Leisure] : leisure [Social interactions] : social interactions [Rest] : rest [Sitting] : sitting [Standing] : standing [Walking] : walking [Stairs] : stairs [de-identified] : Pt is a 71 year old F who presents today for evaluation of their b/l  feet (R>L). Pt states that she started having pain after a 2 mile hike about 4 wks ago. Pain localized along the R medial foot and more diffusely throughout the L foot. H/o ankle fx ~25yrs ago. Denies recent trauma. + N/T.  Ice to affected area. No  formal treatment to date. WB in shoes w/ cane. Swelling. \par \par Hx of osteoporosis on Prolea [] : Post Surgical Visit: no [FreeTextEntry1] : R foot [FreeTextEntry7] : sometimes up her leg

## 2022-07-14 NOTE — PHYSICAL EXAM
[Right] : right foot and ankle [Decreased] : saphenous nerve sensation decreased [] : no calf tenderness

## 2022-07-17 ENCOUNTER — FORM ENCOUNTER (OUTPATIENT)
Age: 72
End: 2022-07-17

## 2022-07-18 ENCOUNTER — APPOINTMENT (OUTPATIENT)
Dept: MRI IMAGING | Facility: CLINIC | Age: 72
End: 2022-07-18

## 2022-07-18 PROCEDURE — 73721 MRI JNT OF LWR EXTRE W/O DYE: CPT | Mod: RT,MH

## 2022-07-21 ENCOUNTER — APPOINTMENT (OUTPATIENT)
Dept: ORTHOPEDIC SURGERY | Facility: CLINIC | Age: 72
End: 2022-07-21

## 2022-07-21 PROCEDURE — 99214 OFFICE O/P EST MOD 30 MIN: CPT

## 2022-07-21 PROCEDURE — L4350: CPT

## 2022-07-21 NOTE — DATA REVIEWED
[MRI] : MRI [Left] : left [Ankle] : ankle [I independently reviewed and interpreted images and report] : I independently reviewed and interpreted images and report [I reviewed the films/CD and agree] : I reviewed the films/CD and agree [FreeTextEntry1] : 7/18/22: 1. Multifocal tendinopathy and tenosynovitis with mild partial tearing of the distal anterior tibialis tendon with \par tendinitis, soft tissue swelling and bursitis in the dorsal medial midfoot and surrounding the ankle.\par 2. Os trigonum, mild chronic sprains of the anterior talofibular and deltoid ligaments, Achilles tendinopathy, \par plantar fascial thickening and bone spurs.\par 3. No fracture, malalignment, osteochondral lesion, or loose body. [FreeTextEntry2] : 7/18/22: 1. Moderate os trigonum syndrome.\par 2. Mild soft tissue swelling surrounding the ankle with mild sprains of the anterior talofibular, calcaneofibular \par and deltoid ligaments.\par 3. Multifocal tendinopathy and tenosynovitis with dorsal and plantar calcaneal bone spurs and mild scattered \par diffuse midfoot arthrosis.\par 4. No acute osseous injury is suspected.

## 2022-07-21 NOTE — HISTORY OF PRESENT ILLNESS
[Sudden] : sudden [10] : 10 [5] : 5 [Localized] : localized [Stabbing] : stabbing [Household chores] : household chores [Leisure] : leisure [Social interactions] : social interactions [Rest] : rest [Sitting] : sitting [Standing] : standing [Walking] : walking [Stairs] : stairs [de-identified] : 7/14/22: Pt is a 71 year old F who presents today for evaluation of their b/l  feet (R>L). Pt states that she started having pain after a 2 mile hike about 4 wks ago. Pain localized along the R medial foot and more diffusely throughout the L foot. H/o ankle fx ~25yrs ago. Denies recent trauma. + N/T.  Ice to affected area. No  formal treatment to date. WB in shoes w/ cane. Swelling. \par Hx of osteoporosis on Prolea\par \par 7/21/22: f/u bilateral ankles to review MRI B/L latasha [] : Post Surgical Visit: no [FreeTextEntry1] : R foot [FreeTextEntry7] : sometimes up her leg

## 2022-07-21 NOTE — IMAGING
[Bilateral] : ankle bilaterally [There are no fractures, subluxations or dislocations. No significant abnormalities are seen] : There are no fractures, subluxations or dislocations. No significant abnormalities are seen [Weight -] : weightbearing [Degenerative change] : Degenerative change [FreeTextEntry9] : R sub malleolar calcifications chronic appearing [de-identified] : midfoot dorsal spur

## 2022-08-05 ENCOUNTER — APPOINTMENT (OUTPATIENT)
Dept: ORTHOPEDIC SURGERY | Facility: CLINIC | Age: 72
End: 2022-08-05

## 2022-08-05 VITALS — BODY MASS INDEX: 28.89 KG/M2 | WEIGHT: 157 LBS | HEIGHT: 62 IN

## 2022-08-05 DIAGNOSIS — S90.822A BLISTER (NONTHERMAL), LEFT FOOT, INITIAL ENCOUNTER: ICD-10-CM

## 2022-08-05 PROCEDURE — 99213 OFFICE O/P EST LOW 20 MIN: CPT

## 2022-08-05 NOTE — HISTORY OF PRESENT ILLNESS
[6] : 6 [0] : 0 [Stabbing] : stabbing [Intermittent] : intermittent [Rest] : rest [Walking] : walking [de-identified] : Pt. is under the care of Dr. Stewart for bilateral ankle issue. Reports she developed a large blister on her lateral heel earlier today which she attributes to the brace. WB in the office with a large bandage over the site. No fevers, chills, sweats.  [] : Post Surgical Visit: no [FreeTextEntry1] : Left Foot

## 2022-08-05 NOTE — PHYSICAL EXAM
[Left] : left foot and ankle [NL (40)] : plantar flexion 40 degrees [NL 30)] : inversion 30 degrees [4___] : eversion 4[unfilled]/5 [5___] : Pending sale to [facility] 5[unfilled]/5 [2+] : posterior tibialis pulse: 2+ [Normal] : saphenous nerve sensation normal [] : patient ambulates without assistive device [FreeTextEntry3] : Clean blister lateral heel/hind foot.  [de-identified] : eversion 15 degrees [TWNoteComboBox7] : dorsiflexion 15 degrees

## 2022-08-05 NOTE — ASSESSMENT
[FreeTextEntry1] : Discussed treatment options.\par Offered aspiration of large blister today and patient opts for this.\par \par Risks of aspiration discussed with patient. She tolerated this well.\par \par Pt. will keep area covered and dry. \par Avoid brace until blister has resolved.\par Ice to affected area.\par Pt. was encouraged to wear a thicker, taller sock (had been wearing thin, low ankle sock).\par F/U with Dr. Stewart as scheduled.

## 2022-08-25 ENCOUNTER — APPOINTMENT (OUTPATIENT)
Dept: ORTHOPEDIC SURGERY | Facility: CLINIC | Age: 72
End: 2022-08-25

## 2022-08-25 VITALS — HEIGHT: 63 IN | BODY MASS INDEX: 28.35 KG/M2 | WEIGHT: 160 LBS

## 2022-08-25 DIAGNOSIS — M76.821 POSTERIOR TIBIAL TENDINITIS, RIGHT LEG: ICD-10-CM

## 2022-08-25 DIAGNOSIS — M76.822 POSTERIOR TIBIAL TENDINITIS, RIGHT LEG: ICD-10-CM

## 2022-08-25 DIAGNOSIS — M76.811 ANTERIOR TIBIAL SYNDROME, RIGHT LEG: ICD-10-CM

## 2022-08-25 PROCEDURE — 99213 OFFICE O/P EST LOW 20 MIN: CPT

## 2022-08-25 NOTE — HISTORY OF PRESENT ILLNESS
[Sudden] : sudden [10] : 10 [5] : 5 [Localized] : localized [Stabbing] : stabbing [Household chores] : household chores [Leisure] : leisure [Social interactions] : social interactions [Rest] : rest [Sitting] : sitting [Standing] : standing [Walking] : walking [Stairs] : stairs [de-identified] : 7/14/22: Pt is a 71 year old F who presents today for evaluation of their b/l  feet (R>L). Pt states that she started having pain after a 2 mile hike about 4 wks ago. Pain localized along the R medial foot and more diffusely throughout the L foot. H/o ankle fx ~25yrs ago. Denies recent trauma. + N/T.  Ice to affected area. No  formal treatment to date. WB in shoes w/ cane. Swelling. \par Hx of osteoporosis on Prolea\par 7/21/22: f/u bilateral ankles to review MRI B/L ankes\par 8/25/2022: FU [facility] feet. Patient states improvement since last visit. Pt states she goes to PT 3x a week and is helping her.\par HEP, walked 15 minutes\par \par  [] : Post Surgical Visit: no [FreeTextEntry1] : [facility] feet and ankle [FreeTextEntry7] : sometimes up her leg [de-identified] : PT 3x a week

## 2022-09-01 ENCOUNTER — APPOINTMENT (OUTPATIENT)
Dept: ORTHOPEDIC SURGERY | Facility: CLINIC | Age: 72
End: 2022-09-01

## 2022-09-11 ENCOUNTER — NON-APPOINTMENT (OUTPATIENT)
Age: 72
End: 2022-09-11

## 2022-09-12 ENCOUNTER — RESULT REVIEW (OUTPATIENT)
Age: 72
End: 2022-09-12

## 2022-09-13 ENCOUNTER — APPOINTMENT (OUTPATIENT)
Dept: ORTHOPEDIC SURGERY | Facility: CLINIC | Age: 72
End: 2022-09-13

## 2022-09-13 VITALS — HEIGHT: 63 IN | WEIGHT: 160 LBS | BODY MASS INDEX: 28.35 KG/M2

## 2022-09-13 DIAGNOSIS — M47.816 SPONDYLOSIS W/OUT MYELOPATHY OR RADICULOPATHY, LUMBAR REGION: ICD-10-CM

## 2022-09-13 DIAGNOSIS — M23.91 UNSPECIFIED INTERNAL DERANGEMENT OF RIGHT KNEE: ICD-10-CM

## 2022-09-13 DIAGNOSIS — M79.7 FIBROMYALGIA: ICD-10-CM

## 2022-09-13 DIAGNOSIS — Z85.21 PERSONAL HISTORY OF MALIGNANT NEOPLASM OF LARYNX: ICD-10-CM

## 2022-09-13 DIAGNOSIS — Z86.59 PERSONAL HISTORY OF OTHER MENTAL AND BEHAVIORAL DISORDERS: ICD-10-CM

## 2022-09-13 DIAGNOSIS — M81.0 AGE-RELATED OSTEOPOROSIS W/OUT CURRENT PATHOLOGICAL FRACTURE: ICD-10-CM

## 2022-09-13 DIAGNOSIS — Z86.39 PERSONAL HISTORY OF OTHER ENDOCRINE, NUTRITIONAL AND METABOLIC DISEASE: ICD-10-CM

## 2022-09-13 DIAGNOSIS — M54.16 RADICULOPATHY, LUMBAR REGION: ICD-10-CM

## 2022-09-13 PROCEDURE — 72100 X-RAY EXAM L-S SPINE 2/3 VWS: CPT

## 2022-09-13 PROCEDURE — 73562 X-RAY EXAM OF KNEE 3: CPT | Mod: RT

## 2022-09-13 PROCEDURE — 99214 OFFICE O/P EST MOD 30 MIN: CPT

## 2022-09-13 PROCEDURE — 72170 X-RAY EXAM OF PELVIS: CPT

## 2022-09-13 RX ORDER — METHYLPREDNISOLONE 4 MG/1
4 TABLET ORAL
Qty: 1 | Refills: 1 | Status: ACTIVE | COMMUNITY
Start: 2022-09-13 | End: 1900-01-01

## 2022-09-13 NOTE — REASON FOR VISIT
[FreeTextEntry2] : past 4 days pain right back down leg. seen [facility] [place]  where medication ordered.

## 2022-09-13 NOTE — HISTORY OF PRESENT ILLNESS
[Sudden] : sudden [9] : 9 [Burning] : burning [Sharp] : sharp [Shooting] : shooting [Intermittent] : intermittent [Ice] : ice [Heat] : heat [Standing] : standing [Walking] : walking [Retired] : Work status: retired [de-identified] : 9/13/22  Return visit for this 71 year female who went on 4 mile walk and noticed spon. onset of rt sided buttock and LBP radiiating lateral rt thigh towards rt knee x last 1 weeks duration. Constant and daily. Worse walking and sitting. Occ. wake up pain at night. Taking naprosyn 500mg po bid since yesterday which is helping.\par \par PMH: hx of sciatica rt leg during pregnancy x 30 years ago.\par            Hx of vocal cord cancer. Has a tracheostomy [] : no [FreeTextEntry1] : right back and leg [FreeTextEntry7] : to knee [de-identified] : getting up [de-identified] : 9/12/22 [de-identified] : [facility] [place]

## 2022-09-13 NOTE — PHYSICAL EXAM
[Normal Mood and Affect] : normal mood and affect [Able to Communicate] : able to communicate [Well Developed] : well developed [Well Nourished] : well nourished [NL (90)] : forward flexion 90 degrees [NL (30)] : right lateral bending 30 degrees [Extension] : extension [Bending to left] : bending to left [Bending to right] : bending to right [5___] : hamstring 5[unfilled]/5 [Facet arthropathy] : Facet arthropathy [Disc space narrowing] : Disc space narrowing [Right] : right knee [AP] : anteroposterior [Lateral] : lateral [[place]] : [place] [There are no fractures, subluxations or dislocations. No significant abnormalities are seen] : There are no fractures, subluxations or dislocations. No significant abnormalities are seen [FreeTextEntry1] : DFD L4-S1.  DDD L5-S1 moderate, L1-2 through L3-4 mild [de-identified] : extension 20 degrees [] : no lateral facet of patella tenderness

## 2022-09-22 RX ORDER — METHYLPREDNISOLONE 4 MG/1
4 TABLET ORAL
Qty: 1 | Refills: 0 | Status: ACTIVE | COMMUNITY
Start: 2022-09-22 | End: 1900-01-01

## 2022-09-25 ENCOUNTER — FORM ENCOUNTER (OUTPATIENT)
Age: 72
End: 2022-09-25

## 2022-09-26 ENCOUNTER — APPOINTMENT (OUTPATIENT)
Dept: PAIN MANAGEMENT | Facility: CLINIC | Age: 72
End: 2022-09-26

## 2022-09-26 ENCOUNTER — APPOINTMENT (OUTPATIENT)
Dept: MRI IMAGING | Facility: CLINIC | Age: 72
End: 2022-09-26

## 2022-09-26 VITALS — WEIGHT: 160 LBS | BODY MASS INDEX: 28.35 KG/M2 | HEIGHT: 63 IN

## 2022-09-26 DIAGNOSIS — M51.37 OTHER INTERVERTEBRAL DISC DEGENERATION, LUMBOSACRAL REGION: ICD-10-CM

## 2022-09-26 PROCEDURE — 72148 MRI LUMBAR SPINE W/O DYE: CPT | Mod: MH

## 2022-09-26 PROCEDURE — 99204 OFFICE O/P NEW MOD 45 MIN: CPT

## 2022-09-26 RX ORDER — TAPENTADOL HYDROCHLORIDE 50 MG/1
50 TABLET, FILM COATED ORAL EVERY 8 HOURS
Qty: 21 | Refills: 0 | Status: ACTIVE | COMMUNITY
Start: 2022-09-26 | End: 1900-01-01

## 2022-09-26 NOTE — ASSESSMENT
[FreeTextEntry1] : After discussing various treatment options with the patient including but not limited to oral medications, physical therapy, exercise, modalities as well as interventional spinal injections, we have decided with the following plan:\par \par 1) A MRI is indicated as there has been failure of numerous conservative therapies over the last 6-8 weeks. these include but are not limited to medication therapy and physical therapy. \par \par 2) sounds like a right L4 nerve impingement. \par \par consider right L4 TFESI

## 2022-09-26 NOTE — HISTORY OF PRESENT ILLNESS
[Lower back] : lower back [10] : 10 [8] : 8 [Radiating] : radiating [Sharp] : sharp [Shooting] : shooting [Stabbing] : stabbing [Throbbing] : throbbing [Tightness] : tightness [Constant] : constant [Household chores] : household chores [Sleep] : sleep [Rest] : rest [Meds] : meds [Standing] : standing [Lying in bed] : lying in bed [FreeTextEntry1] : 09/26/2022 : Patient presents for initial evaluation. She complains of back and right thigh and knee pain. Pain started about 2 weeks ago without instigation. She started PT. + weakness. +n/t. Medrol did not help. \par \par Subjective Weakness: Yes\par Numbness/Tingling: Yes\par Bladder/Bowel dysfunction: No\par Physical Therapy: Yes--> current\par \par \par Attempted modalities for current pain complaint:\par See above:\par Medications: gabapentin 300mg BID\par \par Injections: No \par \par Previous Spine Surgery: N/A\par \par Imaging:\par MRI Lumbar Spine (date) : NTD\par   [] : no [FreeTextEntry7] : Right tight and down to the  knee

## 2022-09-26 NOTE — PHYSICAL EXAM
[Extension] : extension [3___] : right hip flexion 3[unfilled]/5 [] : positive sitting straight leg raise [TWNoteComboBox7] : forward flexion 75 degrees [de-identified] : extension 20 degrees

## 2022-10-03 ENCOUNTER — APPOINTMENT (OUTPATIENT)
Dept: PAIN MANAGEMENT | Facility: CLINIC | Age: 72
End: 2022-10-03

## 2022-10-03 PROCEDURE — 64483 NJX AA&/STRD TFRM EPI L/S 1: CPT | Mod: RT

## 2022-10-03 NOTE — PROCEDURE
[FreeTextEntry3] : Date of Service: 10/03/2022 \par \par Account: [id number]\par \par Patient: DEEPTHI RIBEIRO \par \par YOB: 1950\par \par Age: 71 year\par \par \par Surgeon: Nandini Sharp M.D.\par \par Anesthesia: MAC \par \par Assistant: None.\par \par Pre-Operative Diagnosis: Lumbosacral Radiculitis (M54.17)\par \par Post Operative Diagnosis: Lumbosacral Radiculitis (M54.17)\par \par Procedure: Right L4-5 transforaminal epidural steroid injection under fluoroscopic guidance.\par \par Anesthesia: Local with Sedation and Nasal Oxygen\par \par \par This procedure was carried out using fluoroscopic guidance.  The risks and benefits of the procedure were discussed extensively with the patient.  The consent of the patient was obtained and the following procedure was performed. The patient was placed in the prone position on the fluoroscopic table and the lumbar area was prepped and draped in a sterile fashion.\par \par The right L4-5 neural foramen was identified on right oblique  "cristhian dog" anatomical view at the 6 o' clock position using fluoroscopic guidance, and the area was marked. The overlying skin and subcutaneous structures were anesthetized using sterile technique with 1% Lidocaine.  A 22 gauge spinal needle was directed toward the inferior (6 o'clock) position of the pedicle, which formed the roof of the identified foramen.  Once in the epidural space, after negative aspiration for heme and CSF, 1cc of Omnipaque contrast was injected to confirm epidural location and assess filling defects and rule out intravascular needle placement. \par \par The following contrast flow and epidurogram was observed: no intravascular or intrathecal flow pattern was noted.  No blood or CSF was aspirated. Omnipaque spread appeared to outline the right L4 nerve root and spread medially into the epidural space.  \par \par After this, an injectate of 3 cc preservative free normal saline plus 80 mg of Kenalog was injected in the epidural space at this level.\par \par The needle was subsequently removed.  Vital signs remained normal.  Pulse oximeter was used throughout the procedure and the patient's pulse and oxygen saturation remained within normal limits.  The patient tolerated the procedure well.  There were no complications.  The patient was instructed to apply ice over the injection sites for twenty minutes every two hours for the next 24 to 48 hours.  The patient was also instructed to contact me immediately if there were any problems.\par \theron Sharp M.D.\theron \theron

## 2022-10-13 ENCOUNTER — NON-APPOINTMENT (OUTPATIENT)
Age: 72
End: 2022-10-13

## 2022-10-14 NOTE — ED PROVIDER NOTE - CPE EDP EYES NORM
She has had this feeling every 2-3 months very touch sensitive  This was considered part of her thyroid today shin more itchy than usual has been applying lotion and oils to help if this is dry skin but it is not helping. She is wondering if Dr Cheryl Corbett has any idea what is causing this? normal...

## 2022-10-16 ENCOUNTER — FORM ENCOUNTER (OUTPATIENT)
Age: 72
End: 2022-10-16

## 2022-10-17 ENCOUNTER — APPOINTMENT (OUTPATIENT)
Dept: MRI IMAGING | Facility: CLINIC | Age: 72
End: 2022-10-17

## 2022-10-17 ENCOUNTER — APPOINTMENT (OUTPATIENT)
Dept: PAIN MANAGEMENT | Facility: CLINIC | Age: 72
End: 2022-10-17

## 2022-10-17 VITALS — BODY MASS INDEX: 28.35 KG/M2 | WEIGHT: 160 LBS | HEIGHT: 63 IN

## 2022-10-17 DIAGNOSIS — M17.9 OSTEOARTHRITIS OF KNEE, UNSPECIFIED: ICD-10-CM

## 2022-10-17 PROCEDURE — 99214 OFFICE O/P EST MOD 30 MIN: CPT

## 2022-10-17 PROCEDURE — 73721 MRI JNT OF LWR EXTRE W/O DYE: CPT | Mod: RT,MH

## 2022-10-17 NOTE — ASSESSMENT
[FreeTextEntry1] : After discussing various treatment options with the patient including but not limited to oral medications, physical therapy, exercise, modalities as well as interventional spinal injections, we have decided with the following plan:\par \par 1. A MRI is indicated as there has been failure of numerous conservative therapies over the last 6-8 weeks. these include but are not limited to medication therapy and physical therapy. \par \par sounds like mensicus tear. no relief with YESSY, leg gives out. \par \par f/u after MRI\par \par 2. will call for repeat TFESI if needed \par right L4/5

## 2022-10-17 NOTE — HISTORY OF PRESENT ILLNESS
[Lower back] : lower back [Radiating] : radiating [Sharp] : sharp [Stabbing] : stabbing [Constant] : constant [Household chores] : household chores [Sleep] : sleep [Rest] : rest [Meds] : meds [Standing] : standing [Lying in bed] : lying in bed [7] : 7 [4] : 4 [Physical therapy] : physical therapy [FreeTextEntry1] : 10/17/2022: follow up today after right L4/5 TFESI on 10/3/22.  She reports near complete resolution of her pain. Still with pain in the right knee. She reports the knee gives out on her. Had Xrays which were reviewed. has medial compartment narrowing.  She feels unsteady when she walks. \par \par 09/26/2022 : Patient presents for initial evaluation. She complains of back and right thigh and knee pain. Pain started about 2 weeks ago without instigation. She started PT. + weakness. +n/t. Medrol did not help. \par \par Subjective Weakness: Yes\par Numbness/Tingling: Yes\par Bladder/Bowel dysfunction: No\par Physical Therapy: Yes--> current\par \par \par Attempted modalities for current pain complaint:\par See above:\par Medications: gabapentin 300mg BID\par \par Injections: Right L4/5 TFESI (10/3/22)\par \par Previous Spine Surgery: N/A\par \par Imaging:\par MRI Lumbar Spine (date) : NTD\par   [] : no [FreeTextEntry7] : Right tight and down to the  knee

## 2022-10-17 NOTE — PHYSICAL EXAM
[Extension] : extension [3___] : right hip flexion 3[unfilled]/5 [de-identified] : extension 20 degrees [Right] : right knee [] : crepitus [NL (0)] : extension 0 degrees [5___] : quadriceps 5[unfilled]/5 [Positive] : positive Nathaly [TWNoteComboBox7] : flexion 115 degrees

## 2022-11-03 ENCOUNTER — NON-APPOINTMENT (OUTPATIENT)
Age: 72
End: 2022-11-03

## 2022-11-07 ENCOUNTER — APPOINTMENT (OUTPATIENT)
Dept: PAIN MANAGEMENT | Facility: CLINIC | Age: 72
End: 2022-11-07

## 2022-11-08 ENCOUNTER — EMERGENCY (EMERGENCY)
Facility: HOSPITAL | Age: 72
LOS: 1 days | Discharge: HOME | End: 2022-11-08
Attending: EMERGENCY MEDICINE | Admitting: EMERGENCY MEDICINE
Payer: MEDICARE

## 2022-11-08 VITALS
TEMPERATURE: 98 F | OXYGEN SATURATION: 96 % | RESPIRATION RATE: 20 BRPM | SYSTOLIC BLOOD PRESSURE: 173 MMHG | WEIGHT: 164.91 LBS | DIASTOLIC BLOOD PRESSURE: 82 MMHG | HEART RATE: 96 BPM | HEIGHT: 63 IN

## 2022-11-08 DIAGNOSIS — Z98.890 OTHER SPECIFIED POSTPROCEDURAL STATES: Chronic | ICD-10-CM

## 2022-11-08 DIAGNOSIS — Z98.89 OTHER SPECIFIED POSTPROCEDURAL STATES: Chronic | ICD-10-CM

## 2022-11-08 DIAGNOSIS — Z90.13 ACQUIRED ABSENCE OF BILATERAL BREASTS AND NIPPLES: Chronic | ICD-10-CM

## 2022-11-08 PROCEDURE — 71045 X-RAY EXAM CHEST 1 VIEW: CPT | Mod: 26

## 2022-11-08 PROCEDURE — 99283 EMERGENCY DEPT VISIT LOW MDM: CPT | Mod: CS

## 2022-11-08 PROCEDURE — 99283 EMERGENCY DEPT VISIT LOW MDM: CPT | Mod: 25

## 2022-11-08 PROCEDURE — 71045 X-RAY EXAM CHEST 1 VIEW: CPT

## 2022-11-08 RX ADMIN — Medication 60 MILLIGRAM(S): at 20:40

## 2022-11-08 NOTE — ED PROVIDER NOTE - PATIENT PORTAL LINK FT
You can access the FollowMyHealth Patient Portal offered by [facility] by registering at the following website: [email or web address]. By joining [employer]’s FollowMyHealth portal, you will also be able to view your health information using other applications (apps) compatible with our system.

## 2022-11-08 NOTE — ED PROVIDER NOTE - NSICDXPASTSURGICALHX_GEN_ALL_CORE_FT
PAST SURGICAL HISTORY:  Anal Abscess      Section X 2    Cosmetic Surgery liposuction abdomen 11/24/10    H/O breast surgery Reconstructive surgery - Right & Left    History of laryngoscopy excision of lesion & biopsy 2018    S/P carpal tunnel release Bilateral    S/P Cholecystectomy     S/P excision of vocal cord nodule 2015    S/P Gastric Bypass  Dr. Gibson    S/P Lumpectomy of Breast left     S/P mastectomy, bilateral 2010

## 2022-11-08 NOTE — ED PROVIDER NOTE - NSICDXPASTMEDICALHX_GEN_ALL_CORE_FT
PAST MEDICAL HISTORY:  Anxiety     Arthritis     Asthma no h/o recent exacerbation    Breast Cancer s/p bilateral mastectomy    Depression     Diverticulitis history treated with ABT    Environmental Allergies dust, mold, mildew, cat dander,    Fibromyalgia     GERD (gastroesophageal reflux disease)     Herniated Disc     Hypothyroid     Laryngeal cancer October 2015 & had Radiation    Neuropathy lumbar herniated disc affecting feet    Obstructive Sleep Apnea pt denies    Osteoarthritis critera    Osteoporosis     Patellar Fracture left 11/09    Vocal cord nodule

## 2022-11-08 NOTE — ED PROVIDER NOTE - NSICDXFAMILYHX_GEN_ALL_CORE_FT
FAMILY HISTORY:  Family history of breast cancer  Family history of liver cancer  Family history of stroke

## 2022-11-08 NOTE — ED ADULT NURSE NOTE - OBJECTIVE STATEMENT
Patient presents with cough and sob for the last few days.  Pt tested positive for covid on 11/4 and since then c/o cough and mucous, with sob, no fever, chills, pt had laryngectomy and has trach that she uses no other complaints.  Rhonchi on auscultation.

## 2022-11-10 NOTE — ED ADULT TRIAGE NOTE - PAIN RATING/NUMBER SCALE (0-10): REST
[facility] Medicine  Progress Note    Patient Name: Jose Luis Rednon  MRN: [id number]  Patient Class: IP- Inpatient   Admission Date: 11/7/2022  Length of Stay: 3 days  Attending Physician: Jermain Jackson, *  Primary Care Provider: Deric Armstrong MD        Subjective:     Principal Problem:DKA (diabetic ketoacidosis)        HPI:  71M with PMH of COPD, HTN, CVA, Bell's Palsy presents with c/o sob, chest pain, nausea, vomiting x 4 days. Pt first noticed chest pain after picking up a family member off the floor. He also has had multiple episodes of N/V with retching which has exacerbated symptoms. The pain radiates around right side to his back. Also has SOB which pt states is chronic but has progressively worsened recently. Denies fever, chills, palpitations, diaphoresis, abd pain, diarrhea, dysuria. No recent fall/trauma. Workup in ED remarkable for hyperglycemia, HAGMA, elevated beta hydroxy. CXR showing Subtle bibasilar interstitial opacities, no focal consolidation. No known history of CHF or DM. Patient will be admitted to hospital medicine service for further management.      Overview/Hospital Course:  No notes on file    Interval History:  eventful night with development of Afib w/ RVR, treated with IV diltiazem, c/b hypotension. Transitioned to amiodarone and given dose digoxin. Associated shock requiring brief use of norepinephrine. Cultures growing staph. Has converted to NSR this am. Discussed with family at the bedside.    Review of Systems   Unable to perform ROS: Intubated   Objective:     Vital Signs (Most Recent):  Temp: 97.7 °F (36.5 °C) (11/10/22 1130)  Pulse: 100 (11/10/22 1130)  Resp: (!) 21 (11/10/22 1130)  BP: 138/63 (11/10/22 1130)  SpO2: (!) 94 % (11/10/22 1130)   Vital Signs (24h Range):  Temp:  [96.6 °F (35.9 °C)-99.8 °F (37.7 °C)] 97.7 °F (36.5 °C)  Pulse:  [] 100  Resp:  [20-28] 21  SpO2:  [93 %-100 %] 94 %  BP: ()/(50-79) 138/63     Weight: 90 kg (198  lb 6.6 oz)  Body mass index is 28.47 kg/m².    Intake/Output Summary (Last 24 hours) at 11/10/2022 1141  Last data filed at 11/10/2022 1100  Gross per 24 hour   Intake 2470.58 ml   Output 747 ml   Net 1723.58 ml        Physical Exam  Constitutional:       Appearance: He is ill-appearing. He is not toxic-appearing.      Interventions: He is sedated and intubated.   HENT:      Head: Normocephalic and atraumatic.      Mouth/Throat:      Mouth: Mucous membranes are moist.   Eyes:      Conjunctiva/sclera: Conjunctivae normal.      Pupils: Pupils are equal, round, and reactive to light.   Cardiovascular:      Rate and Rhythm: Normal rate and regular rhythm.      Heart sounds: Normal heart sounds.   Pulmonary:      Effort: Pulmonary effort is normal. He is intubated.      Breath sounds: Rales (bibasilar) present.   Abdominal:      General: There is no distension.      Palpations: Abdomen is soft.      Tenderness: There is no abdominal tenderness. There is no guarding.   Musculoskeletal:         General: Normal range of motion.      Cervical back: Normal range of motion and neck supple.      Right lower leg: Edema present.      Left lower leg: Edema present.   Skin:     General: Skin is warm and dry.   Neurological:      General: No focal deficit present.      Sensory: No sensory deficit.      Comments: Facial droop   Psychiatric:      Comments: Unable to assess       Significant Labs: All pertinent labs within the past 24 hours have been reviewed.    Significant Imaging: I have reviewed all pertinent imaging results/findings within the past 24 hours.      Assessment/Plan:      * DKA (diabetic ketoacidosis)  Workup in ED remarkable for hyperglycemia, HAGMA, elevated beta hydroxy.   New diagnosis of DM  DKA protocol  -A1c greater than 14; will need insulin at time of discharge  -reopened gap; transition back to insulin gtt  - continue detemir 30u bid    Acute renal failure with acute tubular necrosis superimposed on stage 3a  chronic kidney disease  Patient with acute kidney injury likely due to acute tubular necrosis NADIYA is currently worsening. Labs reviewed- Renal function/electrolytes with Estimated Creatinine Clearance: 20.7 mL/min (A) (based on SCr of 3.8 mg/dL (H)). according to latest data. Monitor urine output and serial BMP and adjust therapy as needed. Avoid nephrotoxins and renally dose meds for GFR listed above.   - likely due to septic shock/DKA  - BP support with pressors as needed  - suspect will respond to fluid    Bacteremia due to Staphylococcus  - blood cultures growing staph (appears to be MSSA based off PCR)  - repeat blood cultures for clearance  - admission TTE without vegetation  - on BSABx; if confirmed MSSA, will de-escalate to cefazolin  - ID consult      Atrial fibrillation with RVR  Patient with Paroxysmal (<7 days) atrial fibrillation which is controlled currently with Amiodarone. Patient is currently in sinus rhythm.[id number] Score: 2.  Anticoagulation indicated. Anticoagulation done with lovenox.        Septic shock  - likely due to post-viral PNA with staph bacteremia  - likely distributive component due to sedation  - wean off levophed this am  - continue IV abx  - repeat BCx    Endotracheally intubated  - worsening respiratory failure  - intubated 11/9  - Pulm following      Pneumonia  - suspect due to viral PNA  - COVID and flu negative on admission; repeat with RSV  - on BSABx for now given acute decompensation  - empiric tamiflu  - staph bacteremia; possibly post-viral PNA as source    Centrilobular emphysema  -noted on imaging  -needs PFTs as outpatient and pulmonology follow-up      COPD with acute exacerbation  As above-nightly BiPAP for in addition to prednisone, azithromycin  -decompensation this morning prompting intubation  -continue steroids; on broad-spectrum antibiotics  -additional viral testing    Acute hypoxemic respiratory failure  Likely 2/2 COPD  CXR showing subtle bibasilar  interstitial opacities.  No focal consolidation  CT chest 10/2021: Centrilobular emphysema with an upper lobe predominance.  Bibasilar crackles on exam  Standing duonebs  Started on azithromycin and prednisone; fevers overnight, broadened antibiotics and repeating flu/RSV/COVID  Supp O2 PRN to keep O2 sat 88-92%  TTE  Strict I&O  Hold IV fluids    Benign essential HTN  - hold amlodipine      VTE Risk Mitigation (From admission, onward)         Ordered     enoxaparin injection 40 mg  Daily         11/10/22 1155     IP VTE LOW RISK PATIENT  Once         11/07/22 1231     Place sequential compression device  Until discontinued         11/07/22 1231                Discharge Planning   BELINDA:      Code Status: Full Code   Is the patient medically ready for discharge?:     Reason for patient still in hospital (select all that apply): Patient unstable  Discharge Plan A: Home            Critical care time spent on the evaluation and treatment of severe organ dysfunction, review of pertinent labs and imaging studies, discussions with consulting providers and discussions with patient/family: 45 minutes.      Jermain Jackson MD  Department of Hospital Medicine   [place] - Intensive Care   4

## 2022-11-22 PROBLEM — M54.16 LUMBAR RADICULOPATHY: Status: ACTIVE | Noted: 2022-09-26

## 2022-11-23 ENCOUNTER — APPOINTMENT (OUTPATIENT)
Dept: PAIN MANAGEMENT | Facility: CLINIC | Age: 72
End: 2022-11-23

## 2022-11-23 VITALS — HEIGHT: 63 IN | BODY MASS INDEX: 28.35 KG/M2 | WEIGHT: 160 LBS

## 2022-11-23 DIAGNOSIS — M54.16 RADICULOPATHY, LUMBAR REGION: ICD-10-CM

## 2022-11-23 DIAGNOSIS — M19.90 UNSPECIFIED OSTEOARTHRITIS, UNSPECIFIED SITE: ICD-10-CM

## 2022-11-23 PROCEDURE — 99214 OFFICE O/P EST MOD 30 MIN: CPT

## 2022-11-23 NOTE — HISTORY OF PRESENT ILLNESS
[Lower back] : lower back [Radiating] : radiating [Sharp] : sharp [Household chores] : household chores [Rest] : rest [Meds] : meds [Physical therapy] : physical therapy [Standing] : standing [Lying in bed] : lying in bed [1] : 2 [Dull/Aching] : dull/aching [Intermittent] : intermittent [Ice] : ice [Heat] : heat [] : no [FreeTextEntry1] : 11/23/22: f/u to review MRI of the right knee 10/17/22:  Impression: \par 1. Medial meniscal tear.\par 2. Contusion of the anterior medial tibia.\par 3. Quadriceps insertional tendinopathy, fraying, and spurring of the superior patella.\par 4. Hamstring and gastrocnemius tendinopathy with soft tissue edema. Ruptured popliteal cyst.\par 5. Soft tissue edema most noted anterior medial with contusion posterior distal medial margin of vastus \par medialis.\par \par Pain in the right knee has resolved. Knees still feel week. She got Covid in September and stopped PT> \par \par 10/17/2022: follow up today after right L4/5 TFESI on 10/3/22.  She reports near complete resolution of her pain. Still with pain in the right knee. She reports the knee gives out on her. Had Xrays which were reviewed. has medial compartment narrowing.  She feels unsteady when she walks. \par \par 09/26/2022 : Patient presents for initial evaluation. She complains of back and right thigh and knee pain. Pain started about 2 weeks ago without instigation. She started PT. + weakness. +n/t. Medrol did not help. \par \par Subjective Weakness: Yes\par Numbness/Tingling: Yes\par Bladder/Bowel dysfunction: No\par Physical Therapy: Yes--> current\par \par \par Attempted modalities for current pain complaint:\par See above:\par Medications: gabapentin 300mg BID\par \par Injections: Right L4/5 TFESI (10/3/22)\par \par Previous Spine Surgery: N/A\par \par Imaging:\par MRI Lumbar Spine (date) : NTD\par

## 2022-11-23 NOTE — ASSESSMENT
[FreeTextEntry1] : After discussing various treatment options with the patient including but not limited to oral medications, physical therapy, exercise, modalities as well as interventional spinal injections, we have decided with the following plan:\par \par 1) The patient would benefit from continuation of physical therapy. Short and Long Term goals would be improvement of pain level, improvement of range of motion, improvement of strength and overall improvement of quality of life. \par \par 2. will call for repeat TFESI if needed \par right L4/5

## 2022-11-23 NOTE — PHYSICAL EXAM
[Extension] : extension [3___] : right hip flexion 3[unfilled]/5 [Right] : right knee [NL (0)] : extension 0 degrees [5___] : quadriceps 5[unfilled]/5 [Positive] : positive Nathaly [de-identified] : extension 20 degrees [] : no medial joint line tenderness [TWNoteComboBox7] : flexion 115 degrees

## 2022-11-23 NOTE — DATA REVIEWED
[Lumbar Spine] : lumbar spine [MRI] : MRI [Knee] : knee [Report was reviewed and noted in the chart] : The report was reviewed and noted in the chart [I reviewed the films/CD and agree] : I reviewed the films/CD and agree

## 2022-12-05 ENCOUNTER — NON-APPOINTMENT (OUTPATIENT)
Age: 72
End: 2022-12-05

## 2022-12-17 ENCOUNTER — NON-APPOINTMENT (OUTPATIENT)
Age: 72
End: 2022-12-17

## 2022-12-18 ENCOUNTER — EMERGENCY (EMERGENCY)
Facility: HOSPITAL | Age: 72
LOS: 1 days | Discharge: HOME | End: 2022-12-18
Attending: EMERGENCY MEDICINE | Admitting: EMERGENCY MEDICINE
Payer: MEDICARE

## 2022-12-18 VITALS
OXYGEN SATURATION: 95 % | DIASTOLIC BLOOD PRESSURE: 79 MMHG | HEIGHT: 63 IN | RESPIRATION RATE: 20 BRPM | TEMPERATURE: 99 F | HEART RATE: 85 BPM | SYSTOLIC BLOOD PRESSURE: 176 MMHG | WEIGHT: 169.09 LBS

## 2022-12-18 VITALS
RESPIRATION RATE: 18 BRPM | HEART RATE: 92 BPM | DIASTOLIC BLOOD PRESSURE: 84 MMHG | OXYGEN SATURATION: 94 % | SYSTOLIC BLOOD PRESSURE: 154 MMHG | TEMPERATURE: 98 F

## 2022-12-18 DIAGNOSIS — Z98.890 OTHER SPECIFIED POSTPROCEDURAL STATES: Chronic | ICD-10-CM

## 2022-12-18 DIAGNOSIS — Z90.13 ACQUIRED ABSENCE OF BILATERAL BREASTS AND NIPPLES: Chronic | ICD-10-CM

## 2022-12-18 DIAGNOSIS — Z98.89 OTHER SPECIFIED POSTPROCEDURAL STATES: Chronic | ICD-10-CM

## 2022-12-18 LAB
ALBUMIN SERPL ELPH-MCNC: 3.9 G/DL — SIGNIFICANT CHANGE UP (ref 3.3–5)
ALP SERPL-CCNC: 105 U/L — SIGNIFICANT CHANGE UP (ref 30–120)
ALT FLD-CCNC: 28 U/L DA — SIGNIFICANT CHANGE UP (ref 10–60)
ANION GAP SERPL CALC-SCNC: 10 MMOL/L — SIGNIFICANT CHANGE UP (ref 5–17)
APPEARANCE UR: CLEAR — SIGNIFICANT CHANGE UP
APTT BLD: 33.6 SEC — SIGNIFICANT CHANGE UP (ref 27.5–35.5)
AST SERPL-CCNC: 26 U/L — SIGNIFICANT CHANGE UP (ref 10–40)
BASOPHILS # BLD AUTO: 0.06 K/UL — SIGNIFICANT CHANGE UP (ref 0–0.2)
BASOPHILS NFR BLD AUTO: 0.7 % — SIGNIFICANT CHANGE UP (ref 0–2)
BILIRUB SERPL-MCNC: 0.3 MG/DL — SIGNIFICANT CHANGE UP (ref 0.2–1.2)
BILIRUB UR-MCNC: NEGATIVE — SIGNIFICANT CHANGE UP
BUN SERPL-MCNC: 9 MG/DL — SIGNIFICANT CHANGE UP (ref 7–23)
CALCIUM SERPL-MCNC: 9.1 MG/DL — SIGNIFICANT CHANGE UP (ref 8.4–10.5)
CHLORIDE SERPL-SCNC: 103 MMOL/L — SIGNIFICANT CHANGE UP (ref 96–108)
CO2 SERPL-SCNC: 28 MMOL/L — SIGNIFICANT CHANGE UP (ref 22–31)
COLOR SPEC: YELLOW — SIGNIFICANT CHANGE UP
CREAT SERPL-MCNC: 0.56 MG/DL — SIGNIFICANT CHANGE UP (ref 0.5–1.3)
DIFF PNL FLD: NEGATIVE — SIGNIFICANT CHANGE UP
EGFR: 98 ML/MIN/1.73M2 — SIGNIFICANT CHANGE UP
EOSINOPHIL # BLD AUTO: 0.3 K/UL — SIGNIFICANT CHANGE UP (ref 0–0.5)
EOSINOPHIL NFR BLD AUTO: 3.5 % — SIGNIFICANT CHANGE UP (ref 0–6)
GLUCOSE SERPL-MCNC: 121 MG/DL — HIGH (ref 70–99)
GLUCOSE UR QL: NEGATIVE MG/DL — SIGNIFICANT CHANGE UP
HCT VFR BLD CALC: 40.7 % — SIGNIFICANT CHANGE UP (ref 34.5–45)
HGB BLD-MCNC: 13.7 G/DL — SIGNIFICANT CHANGE UP (ref 11.5–15.5)
IMM GRANULOCYTES NFR BLD AUTO: 0.3 % — SIGNIFICANT CHANGE UP (ref 0–0.9)
INR BLD: 1 RATIO — SIGNIFICANT CHANGE UP (ref 0.88–1.16)
KETONES UR-MCNC: NEGATIVE — SIGNIFICANT CHANGE UP
LACTATE SERPL-SCNC: 1.2 MMOL/L — SIGNIFICANT CHANGE UP (ref 0.7–2)
LEUKOCYTE ESTERASE UR-ACNC: NEGATIVE — SIGNIFICANT CHANGE UP
LYMPHOCYTES # BLD AUTO: 0.96 K/UL — LOW (ref 1–3.3)
LYMPHOCYTES # BLD AUTO: 11 % — LOW (ref 13–44)
MCHC RBC-ENTMCNC: 32.6 PG — SIGNIFICANT CHANGE UP (ref 27–34)
MCHC RBC-ENTMCNC: 33.7 GM/DL — SIGNIFICANT CHANGE UP (ref 32–36)
MCV RBC AUTO: 96.9 FL — SIGNIFICANT CHANGE UP (ref 80–100)
MONOCYTES # BLD AUTO: 0.49 K/UL — SIGNIFICANT CHANGE UP (ref 0–0.9)
MONOCYTES NFR BLD AUTO: 5.6 % — SIGNIFICANT CHANGE UP (ref 2–14)
NEUTROPHILS # BLD AUTO: 6.85 K/UL — SIGNIFICANT CHANGE UP (ref 1.8–7.4)
NEUTROPHILS NFR BLD AUTO: 78.9 % — HIGH (ref 43–77)
NITRITE UR-MCNC: NEGATIVE — SIGNIFICANT CHANGE UP
NRBC # BLD: 0 /100 WBCS — SIGNIFICANT CHANGE UP (ref 0–0)
PH UR: 8 — SIGNIFICANT CHANGE UP (ref 5–8)
PLATELET # BLD AUTO: 252 K/UL — SIGNIFICANT CHANGE UP (ref 150–400)
POTASSIUM SERPL-MCNC: 3.7 MMOL/L — SIGNIFICANT CHANGE UP (ref 3.5–5.3)
POTASSIUM SERPL-SCNC: 3.7 MMOL/L — SIGNIFICANT CHANGE UP (ref 3.5–5.3)
PROT SERPL-MCNC: 7.7 G/DL — SIGNIFICANT CHANGE UP (ref 6–8.3)
PROT UR-MCNC: NEGATIVE MG/DL — SIGNIFICANT CHANGE UP
PROTHROM AB SERPL-ACNC: 11.8 SEC — SIGNIFICANT CHANGE UP (ref 10.5–13.4)
RAPID RVP RESULT: SIGNIFICANT CHANGE UP
RBC # BLD: 4.2 M/UL — SIGNIFICANT CHANGE UP (ref 3.8–5.2)
RBC # FLD: 12.2 % — SIGNIFICANT CHANGE UP (ref 10.3–14.5)
SARS-COV-2 RNA SPEC QL NAA+PROBE: SIGNIFICANT CHANGE UP
SODIUM SERPL-SCNC: 141 MMOL/L — SIGNIFICANT CHANGE UP (ref 135–145)
SP GR SPEC: 1.01 — SIGNIFICANT CHANGE UP (ref 1.01–1.02)
UROBILINOGEN FLD QL: NEGATIVE MG/DL — SIGNIFICANT CHANGE UP
WBC # BLD: 8.69 K/UL — SIGNIFICANT CHANGE UP (ref 3.8–10.5)
WBC # FLD AUTO: 8.69 K/UL — SIGNIFICANT CHANGE UP (ref 3.8–10.5)

## 2022-12-18 PROCEDURE — 93010 ELECTROCARDIOGRAM REPORT: CPT

## 2022-12-18 PROCEDURE — 36415 COLL VENOUS BLD VENIPUNCTURE: CPT

## 2022-12-18 PROCEDURE — 0225U NFCT DS DNA&RNA 21 SARSCOV2: CPT

## 2022-12-18 PROCEDURE — 96361 HYDRATE IV INFUSION ADD-ON: CPT

## 2022-12-18 PROCEDURE — 71045 X-RAY EXAM CHEST 1 VIEW: CPT

## 2022-12-18 PROCEDURE — 93005 ELECTROCARDIOGRAM TRACING: CPT

## 2022-12-18 PROCEDURE — 99285 EMERGENCY DEPT VISIT HI MDM: CPT | Mod: 25

## 2022-12-18 PROCEDURE — 83605 ASSAY OF LACTIC ACID: CPT

## 2022-12-18 PROCEDURE — 96374 THER/PROPH/DIAG INJ IV PUSH: CPT

## 2022-12-18 PROCEDURE — 87040 BLOOD CULTURE FOR BACTERIA: CPT

## 2022-12-18 PROCEDURE — 85610 PROTHROMBIN TIME: CPT

## 2022-12-18 PROCEDURE — 81003 URINALYSIS AUTO W/O SCOPE: CPT

## 2022-12-18 PROCEDURE — 94640 AIRWAY INHALATION TREATMENT: CPT

## 2022-12-18 PROCEDURE — 99285 EMERGENCY DEPT VISIT HI MDM: CPT | Mod: FS,CS

## 2022-12-18 PROCEDURE — 94664 DEMO&/EVAL PT USE INHALER: CPT

## 2022-12-18 PROCEDURE — 71045 X-RAY EXAM CHEST 1 VIEW: CPT | Mod: 26

## 2022-12-18 PROCEDURE — 80053 COMPREHEN METABOLIC PANEL: CPT

## 2022-12-18 PROCEDURE — 85025 COMPLETE CBC W/AUTO DIFF WBC: CPT

## 2022-12-18 PROCEDURE — 85730 THROMBOPLASTIN TIME PARTIAL: CPT

## 2022-12-18 RX ORDER — IPRATROPIUM/ALBUTEROL SULFATE 18-103MCG
3 AEROSOL WITH ADAPTER (GRAM) INHALATION ONCE
Refills: 0 | Status: COMPLETED | OUTPATIENT
Start: 2022-12-18 | End: 2022-12-18

## 2022-12-18 RX ORDER — CELECOXIB 200 MG/1
0 CAPSULE ORAL
Qty: 0 | Refills: 0 | DISCHARGE

## 2022-12-18 RX ORDER — ALBUTEROL 90 UG/1
1 AEROSOL, METERED ORAL
Qty: 30 | Refills: 0
Start: 2022-12-18 | End: 2022-12-22

## 2022-12-18 RX ORDER — AZELASTINE HYDROCHLORIDE AND FLUTICASONE PROPIONATE 137; 50 UG/1; UG/1
0 SPRAY, METERED NASAL
Qty: 0 | Refills: 0 | DISCHARGE

## 2022-12-18 RX ORDER — AZITHROMYCIN 500 MG/1
1 TABLET, FILM COATED ORAL
Qty: 1 | Refills: 0
Start: 2022-12-18 | End: 2022-12-22

## 2022-12-18 RX ORDER — DEXTROAMPHETAMINE SACCHARATE, AMPHETAMINE ASPARTATE, DEXTROAMPHETAMINE SULFATE AND AMPHETAMINE SULFATE 1.875; 1.875; 1.875; 1.875 MG/1; MG/1; MG/1; MG/1
0 TABLET ORAL
Qty: 0 | Refills: 0 | DISCHARGE

## 2022-12-18 RX ORDER — SODIUM CHLORIDE 9 MG/ML
1600 INJECTION INTRAMUSCULAR; INTRAVENOUS; SUBCUTANEOUS ONCE
Refills: 0 | Status: COMPLETED | OUTPATIENT
Start: 2022-12-18 | End: 2022-12-18

## 2022-12-18 RX ORDER — AZITHROMYCIN 500 MG/1
500 TABLET, FILM COATED ORAL ONCE
Refills: 0 | Status: COMPLETED | OUTPATIENT
Start: 2022-12-18 | End: 2022-12-18

## 2022-12-18 RX ADMIN — SODIUM CHLORIDE 1600 MILLILITER(S): 9 INJECTION INTRAMUSCULAR; INTRAVENOUS; SUBCUTANEOUS at 15:23

## 2022-12-18 RX ADMIN — Medication 3 MILLILITER(S): at 18:33

## 2022-12-18 RX ADMIN — SODIUM CHLORIDE 1600 MILLILITER(S): 9 INJECTION INTRAMUSCULAR; INTRAVENOUS; SUBCUTANEOUS at 16:23

## 2022-12-18 RX ADMIN — Medication 3 MILLILITER(S): at 16:03

## 2022-12-18 RX ADMIN — Medication 3 MILLILITER(S): at 17:44

## 2022-12-18 RX ADMIN — AZITHROMYCIN 500 MILLIGRAM(S): 500 TABLET, FILM COATED ORAL at 18:33

## 2022-12-18 RX ADMIN — Medication 125 MILLIGRAM(S): at 15:23

## 2022-12-18 RX ADMIN — Medication 1200 MILLIGRAM(S): at 17:27

## 2022-12-18 NOTE — ED PROVIDER NOTE - NS ED ATTENDING STATEMENT MOD
This was a shared visit with the NATHAN. I reviewed and verified the documentation and independently performed the documented:

## 2022-12-18 NOTE — ED ADULT NURSE NOTE - OBJECTIVE STATEMENT
pt with hx laryngectomy, stoma present, reports "flu symptoms" for day and half, cough, lots of phlegm, upper chest discomfort. denies fever. pt with productive cough, yellow thick sputum. pt able to speak with whispering voice.

## 2022-12-18 NOTE — ED PROVIDER NOTE - PROGRESS NOTE DETAILS
Overall symptoms much improved per patient.  Afebrile.  No hypoxia, tachycardia, or tachypnea.  Feels much better after IV fluids, Mucinex, DuoNeb, and Solu-Medrol.  RVP pending, will call for positive results.  Chest x-ray showed prominent bronchovascular markings which are nonspecific.  Offered chest CT which patient declines.  Due to past medical history and increased markings, will treat with Zithromax.  Patient comfortable with plan.  Supportive care discussed.  Also recommend musinex over-the-counter. patient requesting to go home

## 2022-12-18 NOTE — ED PROVIDER NOTE - OBJECTIVE STATEMENT
72-year-old female with history of GERD, diverticulitis, neuropathy, arthritis, history of laryngeal cancer, sleep apnea, osteoporosis, anxiety, history of breast cancer, hypothyroidism, asthma, fibromyalgia, and depression presents with thick secretions from stoma, cough, and congestion for the past 1 and half days.  Denies any fevers.  Mild shortness of breath.  No chest pain or abdominal pain.  No nausea, vomiting, diarrhea, or abdominal pain.  Was seen at urgent care and sent to emergency room for further evaluation.  PCP Zane Mora

## 2022-12-18 NOTE — ED PROVIDER NOTE - CLINICAL SUMMARY MEDICAL DECISION MAKING FREE TEXT BOX
Patient is a 72-year-old female who presents emergency room with a chief complaint of cough.  Past medical history of GERD, diverticulitis, neuropathy, arthritis, history of laryngeal cancer status post tracheostomy and with cannulization, history of sleep apnea, osteoporosis, anxiety, history of breast cancer, hypothyroidism, asthma, fibromyalgia, depression.  Patient presents to the emergency room with a chief complaint of cough and thick secretions from her stoma.  She is also reporting congestion for the last 1 and half days.  Denies any fevers chills nausea vomiting chest pain or abdominal pain.  She does endorse a mild diffuse headache along with mild shortness of breath.  She was seen at urgent care initially and was sent to the emergency room for further work-up.  On exam patient is sitting up in bed does not appear to be in any acute distress no evidence of hypoxia no signs or symptoms of overt respiratory distress.  Stoma site noted no secretions at this time.  Heart is regular rate lungs with scattered wheezing abdomen soft nontender nondistended.  Patient is presenting to the emergency room with a chief complaint of cough congestion mild shortness of breath.  Concern for possible pulmonary process viral versus bacterial.  Will obtain screening labs viral swab chest x-ray will medicate for symptoms with nebulizers Mucinex and steroids and will monitor.  At this time it does not appear the patient will require admission as she did not have any overt signs of respiratory distress and appears comfortable.  She can likely be discharged home with symptom control.  But ultimate clinical course will be pending results.

## 2022-12-18 NOTE — ED PROVIDER NOTE - PATIENT PORTAL LINK FT
You can access the FollowMyHealth Patient Portal offered by [facility] by registering at the following website: [email or web address]. By joining [employer]’s FollowMyHealth portal, you will also be able to view your health information using other applications (apps) compatible with our system.

## 2022-12-18 NOTE — ED PROVIDER NOTE - NSFOLLOWUPINSTRUCTIONS_ED_ALL_ED_FT
Drink plenty of fluids  Zithromax next 5 days as prescribed  Albuterol nebulized every 4-6 hours, prescription provided  Prednisone once a day next 5 days  Recommend Mucinex over-the-counter  Have close follow-up with primary care doctor  RVP pending, you will be notified for positive results        Upper Respiratory Infection, Adult      An upper respiratory infection (URI) affects the nose, throat, and upper airways that lead to the lungs. The most common type of URI is often called the common cold. URIs usually get better on their own, without medical treatment.      What are the causes?    A URI is caused by a germ (virus). You may catch these germs by:  •Breathing in droplets from an infected person's cough or sneeze.      •Touching something that has the germ on it (is contaminated) and then touching your mouth, nose, or eyes.        What increases the risk?    You are more likely to get a URI if:  •You are very young or very old.      •You have close contact with others, such as at work, school, or a health care facility.      •You smoke.      •You have long-term (chronic) heart or lung disease.      •You have a weakened disease-fighting system (immune system).      •You have nasal allergies or asthma.      •You have a lot of stress.      •You have poor nutrition.        What are the signs or symptoms?    •Runny or stuffy (congested) nose.      •Cough.      •Sneezing.      •Sore throat.      •Headache.      •Feeling tired (fatigue).      •Fever.      •Not wanting to eat as much as usual.      •Pain in your forehead, behind your eyes, and over your cheekbones (sinus pain).      •Muscle aches.      •Redness or irritation of the eyes.      •Pressure in the ears or face.        How is this treated?    URIs usually get better on their own within 7–10 days. Medicines cannot cure URIs, but your doctor may recommend certain medicines to help relieve symptoms, such as:  •Over-the-counter cold medicines.      •Medicines to reduce coughing (cough suppressants). Coughing is a type of defense against infection that helps to clear the nose, throat, windpipe, and lungs (respiratory system). Take these medicines only as told by your doctor.      •Medicines to lower your fever.        Follow these instructions at home:    Activity     •Rest as needed.    •If you have a fever, stay home from work or school until your fever is gone, or until your doctor says you may return to work or school.  •You should stay home until you cannot spread the infection anymore (you are not contagious).      •Your doctor may have you wear a face mask so you have less risk of spreading the infection.        Relieving symptoms     •Rinse your mouth often with salt water. To make salt water, dissolve ½–1 tsp (3–6 g) of salt in 1 cup (237 mL) of warm water.      •Use a cool-mist humidifier to add moisture to the air. This can help you breathe more easily.        Eating and drinking   Three cups showing dark yellow, yellow, and pale yellow pee.   •Drink enough fluid to keep your pee (urine) pale yellow.      •Eat soups and other clear broths.        General instructions   A sign showing that a person should not smoke.   •Take over-the-counter and prescription medicines only as told by your doctor.      • Do not smoke or use any products that contain nicotine or tobacco. If you need help quitting, ask your doctor.      •Avoid being where people are smoking (avoid secondhand smoke).      •Stay up to date on all your shots (immunizations), and get the flu shot every year.      •Keep all follow-up visits.        How to prevent the spread of infection to others   Washing hands with soap and water.   •Wash your hands with soap and water for at least 20 seconds. If you cannot use soap and water, use hand .      •Avoid touching your mouth, face, eyes, or nose.      •Cough or sneeze into a tissue or your sleeve or elbow. Do not cough or sneeze into your hand or into the air.        Contact a doctor if:    •You are getting worse, not better.    •You have any of these:  •A fever or chills.      •Brown or red mucus in your nose.      •Yellow or brown fluid (discharge)coming from your nose.      •Pain in your face, especially when you bend forward.      •Swollen neck glands.      •Pain when you swallow.      •White areas in the back of your throat.          Get help right away if:    •You have shortness of breath that gets worse.    •You have very bad or constant:  •Headache.      •Ear pain.      •Pain in your forehead, behind your eyes, and over your cheekbones (sinus pain).      •Chest pain.      •You have long-lasting (chronic) lung disease along with any of these:  •Making high-pitched whistling sounds when you breathe, most often when you breathe out (wheezing).      •Long-lasting cough (more than 14 days).      •Coughing up blood.      •A change in your usual mucus.        •You have a stiff neck.    •You have changes in your:  •Vision.      •Hearing.      •Thinking.      •Mood.        These symptoms may be an emergency. Get help right away. Call 911.   • Do not wait to see if the symptoms will go away.       • Do not drive yourself to the hospital.         Summary    •An upper respiratory infection (URI) is caused by a germ (virus). The most common type of URI is often called the common cold.      •URIs usually get better within 7–10 days.      •Take over-the-counter and prescription medicines only as told by your doctor.      This information is not intended to replace advice given to you by your health care provider. Make sure you discuss any questions you have with your health care provider.

## 2022-12-18 NOTE — ED PROVIDER NOTE - CARE PROVIDER_API CALL
Last, Zane STANTON ()  Internal Medicine  [address] [place] [address]  Phone: [phone]  Fax: [phone]  Follow Up Time: 1-3 Days

## 2023-03-16 ENCOUNTER — APPOINTMENT (OUTPATIENT)
Dept: ORTHOPEDIC SURGERY | Facility: CLINIC | Age: 73
End: 2023-03-16
Payer: MEDICARE

## 2023-03-16 ENCOUNTER — TRANSCRIPTION ENCOUNTER (OUTPATIENT)
Age: 73
End: 2023-03-16

## 2023-03-16 VITALS — HEIGHT: 63 IN | BODY MASS INDEX: 28.35 KG/M2 | WEIGHT: 160 LBS

## 2023-03-16 DIAGNOSIS — I89.0 LYMPHEDEMA, NOT ELSEWHERE CLASSIFIED: ICD-10-CM

## 2023-03-16 PROCEDURE — L4397: CPT | Mod: RT,KX

## 2023-03-16 PROCEDURE — 99213 OFFICE O/P EST LOW 20 MIN: CPT | Mod: 25

## 2023-03-16 PROCEDURE — 73650 X-RAY EXAM OF HEEL: CPT | Mod: RT

## 2023-03-16 PROCEDURE — 73610 X-RAY EXAM OF ANKLE: CPT | Mod: RT

## 2023-03-16 NOTE — IMAGING
[Weight -] : weightbearing [Right] : right calcaneus [FreeTextEntry9] : minimal djd [de-identified] : plantar/posterior spurs

## 2023-03-16 NOTE — HISTORY OF PRESENT ILLNESS
[Gradual] : gradual [6] : 6 [4] : 4 [Dull/Aching] : dull/aching [Stabbing] : stabbing [Rest] : rest [Meds] : meds [Ice] : ice [de-identified] : 7/14/22: Pt is a 71 year old F who presents today for evaluation of their b/l feet (R>L). Pt states that she started having pain after a 2 mile hike about 4 wks ago. Pain localized along the R medial foot and more diffusely throughout the L foot. H/o ankle fx ~25yrs ago. Denies recent trauma. + N/T. Ice to affected area. No formal treatment to date. WB in shoes w/ cane. Swelling. \par Hx of osteoporosis on Prolea\par \par 7/21/22: f/u bilateral ankles to review MRI B/L ankes \par \par 3/16/23: R heel pain since 1/2023. She denies injury. +AM pain. Unable to be active. No burning. Also noticing the ankle is swollen. Hx larynx cancer  [] : no [FreeTextEntry1] :  right heel/ ankle  [FreeTextEntry3] : a month ago  [FreeTextEntry5] : patient states she has pain and swelling  [de-identified] : activity

## 2023-03-16 NOTE — PHYSICAL EXAM
[Right] : right foot and ankle [Moderate] : moderate swelling of medial ankle [NL (20)] : dorsiflexion 20 degrees [NL (40)] : plantar flexion 40 degrees [5___] : plantar flexion 5[unfilled]/5 [2+] : dorsalis pedis pulse: 2+ [] : no achilles tendon insertion tenderness

## 2023-03-27 ENCOUNTER — TRANSCRIPTION ENCOUNTER (OUTPATIENT)
Age: 73
End: 2023-03-27

## 2023-03-28 ENCOUNTER — RX RENEWAL (OUTPATIENT)
Age: 73
End: 2023-03-28

## 2023-03-29 ENCOUNTER — RX RENEWAL (OUTPATIENT)
Age: 73
End: 2023-03-29

## 2023-04-20 ENCOUNTER — APPOINTMENT (OUTPATIENT)
Dept: ORTHOPEDIC SURGERY | Facility: CLINIC | Age: 73
End: 2023-04-20
Payer: MEDICARE

## 2023-04-20 VITALS — HEIGHT: 63 IN | WEIGHT: 160 LBS | BODY MASS INDEX: 28.35 KG/M2

## 2023-04-20 DIAGNOSIS — M72.2 PLANTAR FASCIAL FIBROMATOSIS: ICD-10-CM

## 2023-04-20 PROCEDURE — 99212 OFFICE O/P EST SF 10 MIN: CPT

## 2023-04-20 RX ORDER — TRAMADOL HYDROCHLORIDE 50 MG/1
50 TABLET, COATED ORAL
Qty: 21 | Refills: 0 | Status: ACTIVE | COMMUNITY
Start: 2022-07-14 | End: 1900-01-01

## 2023-04-20 NOTE — HISTORY OF PRESENT ILLNESS
[Gradual] : gradual [0] : 0 [Dull/Aching] : dull/aching [Stabbing] : stabbing [Rest] : rest [Meds] : meds [Ice] : ice [de-identified] : 7/14/22: Pt is a 71 year old F who presents today for evaluation of their b/l feet (R>L). Pt states that she started having pain after a 2 mile hike about 4 wks ago. Pain localized along the R medial foot and more diffusely throughout the L foot. H/o ankle fx ~25yrs ago. Denies recent trauma. + N/T. Ice to affected area. No formal treatment to date. WB in shoes w/ cane. Swelling. \par Hx of osteoporosis on Prolea\par \par 7/21/22: f/u bilateral ankles to review MRI B/L ankes \par \par 3/16/23: R heel pain since 1/2023. She denies injury. +AM pain. Unable to be active. No burning. Also noticing the ankle is swollen. Hx larynx cancer \par \par 4/20/23: f/u R heel; she reports she is 95% improved.  She walked 1.5 miles yesterday without pain.  [] : no [FreeTextEntry1] :  right heel/ ankle  [FreeTextEntry3] : a month ago  [FreeTextEntry5] : patient states she has pain and swelling  [de-identified] : activity  [de-identified] : physical therapy

## 2023-05-21 NOTE — ASU PATIENT PROFILE, ADULT - CENTRAL VENOUS CATHETER
Take amoxicillin twice a day for 10 days medicine do not skip any dosages    Prednisone 5 mg daily for 3 days    Benzonatate 3 times a day as needed for cough    Continue Mucinex, [place] pot, salt water gargles, tea with honey and lemon    Albuterol inhaler 2 puffs every 4 hours as needed for wheezing    To the emergency room if you develop shortness of breath chest pain or pressure fever greater than 102 and Tylenol or ibuprofen does not bring temperature down.      
no

## 2023-05-26 ENCOUNTER — APPOINTMENT (OUTPATIENT)
Dept: RADIOLOGY | Facility: CLINIC | Age: 73
End: 2023-05-26
Payer: MEDICARE

## 2023-05-26 ENCOUNTER — APPOINTMENT (OUTPATIENT)
Dept: MRI IMAGING | Facility: CLINIC | Age: 73
End: 2023-05-26
Payer: MEDICARE

## 2023-05-26 ENCOUNTER — INPATIENT (INPATIENT)
Facility: HOSPITAL | Age: 73
LOS: 4 days | Discharge: HOME | DRG: 42 | End: 2023-05-31
Attending: PSYCHIATRY & NEUROLOGY | Admitting: PSYCHIATRY & NEUROLOGY
Payer: MEDICARE

## 2023-05-26 VITALS
DIASTOLIC BLOOD PRESSURE: 84 MMHG | RESPIRATION RATE: 19 BRPM | TEMPERATURE: 98 F | HEIGHT: 63 IN | WEIGHT: 169.98 LBS | HEART RATE: 80 BPM | OXYGEN SATURATION: 98 % | SYSTOLIC BLOOD PRESSURE: 145 MMHG

## 2023-05-26 DIAGNOSIS — Z98.890 OTHER SPECIFIED POSTPROCEDURAL STATES: Chronic | ICD-10-CM

## 2023-05-26 DIAGNOSIS — Z90.13 ACQUIRED ABSENCE OF BILATERAL BREASTS AND NIPPLES: Chronic | ICD-10-CM

## 2023-05-26 DIAGNOSIS — Z98.89 OTHER SPECIFIED POSTPROCEDURAL STATES: Chronic | ICD-10-CM

## 2023-05-26 LAB
ALBUMIN SERPL ELPH-MCNC: 4 G/DL — SIGNIFICANT CHANGE UP (ref 3.3–5)
ALP SERPL-CCNC: 97 U/L — SIGNIFICANT CHANGE UP (ref 40–120)
ALT FLD-CCNC: 26 U/L — SIGNIFICANT CHANGE UP (ref 10–45)
ANION GAP SERPL CALC-SCNC: 12 MMOL/L — SIGNIFICANT CHANGE UP (ref 5–17)
APTT BLD: 30.4 SEC — SIGNIFICANT CHANGE UP (ref 27.5–35.5)
AST SERPL-CCNC: 29 U/L — SIGNIFICANT CHANGE UP (ref 10–40)
BASOPHILS # BLD AUTO: 0.08 K/UL — SIGNIFICANT CHANGE UP (ref 0–0.2)
BASOPHILS NFR BLD AUTO: 0.7 % — SIGNIFICANT CHANGE UP (ref 0–2)
BILIRUB SERPL-MCNC: 0.2 MG/DL — SIGNIFICANT CHANGE UP (ref 0.2–1.2)
BUN SERPL-MCNC: 12 MG/DL — SIGNIFICANT CHANGE UP (ref 7–23)
CALCIUM SERPL-MCNC: 8.7 MG/DL — SIGNIFICANT CHANGE UP (ref 8.4–10.5)
CHLORIDE SERPL-SCNC: 102 MMOL/L — SIGNIFICANT CHANGE UP (ref 96–108)
CO2 SERPL-SCNC: 20 MMOL/L — LOW (ref 22–31)
CREAT SERPL-MCNC: 0.57 MG/DL — SIGNIFICANT CHANGE UP (ref 0.5–1.3)
EGFR: 98 ML/MIN/1.73M2 — SIGNIFICANT CHANGE UP
EOSINOPHIL # BLD AUTO: 0.27 K/UL — SIGNIFICANT CHANGE UP (ref 0–0.5)
EOSINOPHIL NFR BLD AUTO: 2.3 % — SIGNIFICANT CHANGE UP (ref 0–6)
GLUCOSE SERPL-MCNC: 128 MG/DL — HIGH (ref 70–99)
HCT VFR BLD CALC: 38.8 % — SIGNIFICANT CHANGE UP (ref 34.5–45)
HGB BLD-MCNC: 12.9 G/DL — SIGNIFICANT CHANGE UP (ref 11.5–15.5)
IMM GRANULOCYTES NFR BLD AUTO: 0.6 % — SIGNIFICANT CHANGE UP (ref 0–0.9)
INR BLD: 0.94 RATIO — SIGNIFICANT CHANGE UP (ref 0.88–1.16)
LYMPHOCYTES # BLD AUTO: 1.17 K/UL — SIGNIFICANT CHANGE UP (ref 1–3.3)
LYMPHOCYTES # BLD AUTO: 10 % — LOW (ref 13–44)
MCHC RBC-ENTMCNC: 32.2 PG — SIGNIFICANT CHANGE UP (ref 27–34)
MCHC RBC-ENTMCNC: 33.2 GM/DL — SIGNIFICANT CHANGE UP (ref 32–36)
MCV RBC AUTO: 96.8 FL — SIGNIFICANT CHANGE UP (ref 80–100)
MONOCYTES # BLD AUTO: 0.66 K/UL — SIGNIFICANT CHANGE UP (ref 0–0.9)
MONOCYTES NFR BLD AUTO: 5.7 % — SIGNIFICANT CHANGE UP (ref 2–14)
NEUTROPHILS # BLD AUTO: 9.42 K/UL — HIGH (ref 1.8–7.4)
NEUTROPHILS NFR BLD AUTO: 80.7 % — HIGH (ref 43–77)
NRBC # BLD: 0 /100 WBCS — SIGNIFICANT CHANGE UP (ref 0–0)
PLATELET # BLD AUTO: 212 K/UL — SIGNIFICANT CHANGE UP (ref 150–400)
POTASSIUM SERPL-MCNC: 4.5 MMOL/L — SIGNIFICANT CHANGE UP (ref 3.5–5.3)
POTASSIUM SERPL-SCNC: 4.5 MMOL/L — SIGNIFICANT CHANGE UP (ref 3.5–5.3)
PROT SERPL-MCNC: 6.5 G/DL — SIGNIFICANT CHANGE UP (ref 6–8.3)
PROTHROM AB SERPL-ACNC: 10.8 SEC — SIGNIFICANT CHANGE UP (ref 10.5–13.4)
RBC # BLD: 4.01 M/UL — SIGNIFICANT CHANGE UP (ref 3.8–5.2)
RBC # FLD: 13.2 % — SIGNIFICANT CHANGE UP (ref 10.3–14.5)
SODIUM SERPL-SCNC: 134 MMOL/L — LOW (ref 135–145)
WBC # BLD: 11.67 K/UL — HIGH (ref 3.8–10.5)
WBC # FLD AUTO: 11.67 K/UL — HIGH (ref 3.8–10.5)

## 2023-05-26 PROCEDURE — 70553 MRI BRAIN STEM W/O & W/DYE: CPT | Mod: MH

## 2023-05-26 PROCEDURE — A9585: CPT | Mod: JW

## 2023-05-26 PROCEDURE — 99285 EMERGENCY DEPT VISIT HI MDM: CPT

## 2023-05-26 PROCEDURE — A9585: CPT

## 2023-05-26 PROCEDURE — 72158 MRI LUMBAR SPINE W/O & W/DYE: CPT | Mod: MH

## 2023-05-26 PROCEDURE — 77080 DXA BONE DENSITY AXIAL: CPT

## 2023-05-26 NOTE — ED CLERICAL - NS ED CLERK NOTE PRE-ARRIVAL INFORMATION; ADDITIONAL PRE-ARRIVAL INFORMATION
CC/Reason For referral: Leg weakness, Acute Left parietal lobe infarct on CT  Preferred Consultant(if applicable): Neuro  Who admits for you (if needed): Neuro  Do you have documents you would like to fax over? No  Would you still like to speak to an ED attending? Yes, please call Dr. Alanis after patient is seen

## 2023-05-26 NOTE — ED PROVIDER NOTE - CLINICAL SUMMARY MEDICAL DECISION MAKING FREE TEXT BOX
Medical decision making history and physical concerning for stroke of unclear etiology.  Patient warrants admission for work-up of this new infarct.  No CDU beds at this time.  Patient will require echo, carotid duplex, angiography of the head and neck.  Neuro consult.

## 2023-05-26 NOTE — ED PROVIDER NOTE - PROGRESS NOTE DETAILS
Katherine Gautam MD PGY1: neuro evaluated patient; will admit for full stroke work-up - CTAs, echo, etc. patient admitted to neuro service.

## 2023-05-26 NOTE — ED PROVIDER NOTE - PHYSICAL EXAMINATION
GENERAL: Awake, alert, NAD  HEAD: NC/AT, neck supple, moist mucous membranes  EYES: pupils 2mm equal, reactive, EOM grossly intact without nystagmus, sclera anicteric  LUNGS: normal WOB on RA, CTAB, no wheezes or crackles   CARDIAC: RRR, no m/r/g  ABDOMEN: Soft, non tender, non distended, no rebound, no guarding  BACK: No midline spinal tenderness, no CVA tenderness.  EXT: No edema, no calf tenderness, no deformities.  NEURO: A&Ox4. Moving all extremities. Gait somewhat broad-based but steady, 4/5 strength noted in bilateral lower extremities, symmetric; reduced sensation bilateral lower extremities; 5/5 strength, sensation intact in upper extremities, CN II-XII grossly intact bilaterally, normal finger-nose-finger bilaterally, no pronator drift  SKIN: Warm and dry. No rash.  PSYCH: Normal affect. GENERAL: Awake, alert, NAD  HEAD: NC/AT, neck supple, s/p laryngectomy with open stoma present; moist mucous membranes  EYES: pupils 2mm equal, reactive, EOM grossly intact without nystagmus, sclera anicteric  LUNGS: normal WOB on RA, CTAB, no wheezes or crackles   CARDIAC: RRR, no m/r/g  ABDOMEN: Soft, non tender, non distended, no rebound, no guarding  BACK: No midline spinal tenderness, no CVA tenderness.  EXT: No edema, no calf tenderness, no deformities.  NEURO: A&Ox4. Moving all extremities. Gait somewhat broad-based but steady, 4/5 strength noted in bilateral lower extremities, symmetric; reduced sensation bilateral lower extremities; 5/5 strength, sensation intact in upper extremities, CN II-XII grossly intact bilaterally, normal finger-nose-finger bilaterally, no pronator drift  SKIN: Warm and dry. No rash.  PSYCH: Normal affect.

## 2023-05-26 NOTE — ED ADULT TRIAGE NOTE - CHIEF COMPLAINT QUOTE
B/L leg weakness/numbness x few weeks   States had outpt MRI done today this AM and showed acute stroke

## 2023-05-26 NOTE — ED ADULT TRIAGE NOTE - OTHER COMPLAINTS
pt s/p laryngectomy, unable to speak pt s/p laryngectomy, unable to speak  call made to mobile RN, pt to be assigned room next

## 2023-05-26 NOTE — ED PROVIDER NOTE - CROS ED CONS ALL NEG
----- Message from Maricarmen Lombardi sent at 2/24/2020  8:06 AM CST -----  Type:  RX Refill Request    Who Called:  Patient   Refill or New Rx:  revill  RX Name and Strength: HYDROcodone-acetaminophen (NORCO)  mg per tablet   How is the patient currently taking it? (ex. 1XDay):  3 x day  Is this a 30 day or 90 day RX:  30  Preferred Pharmacy with phone number:    [facility] - [address] [address]  Phone: [phone] Fax: [phone]    Local or Mail Order:  local  Ordering Provider:  Jc Spear Call Back Number:  [phone]  Additional Information:  Pharmacy has also requested refill     negative...
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
## 2023-05-26 NOTE — ED ADULT TRIAGE NOTE - MEANS OF ARRIVAL
Quality 130: Documentation Of Current Medications In The Medical Record: Current Medications Documented
Detail Level: Detailed
Quality 110: Preventive Care And Screening: Influenza Immunization: Influenza Immunization Administered during Influenza season
Quality 111:Pneumonia Vaccination Status For Older Adults: Pneumococcal Vaccination not Administered or Previously Received, Reason not Otherwise Specified
ambulatory

## 2023-05-26 NOTE — ED ADULT NURSE NOTE - NSFALLHARMRISKINTERV_ED_ALL_ED

## 2023-05-27 ENCOUNTER — TRANSCRIPTION ENCOUNTER (OUTPATIENT)
Age: 73
End: 2023-05-27

## 2023-05-27 DIAGNOSIS — I63.89 OTHER CEREBRAL INFARCTION: ICD-10-CM

## 2023-05-27 PROCEDURE — 70496 CT ANGIOGRAPHY HEAD: CPT | Mod: 26

## 2023-05-27 PROCEDURE — 70498 CT ANGIOGRAPHY NECK: CPT | Mod: 26

## 2023-05-27 RX ORDER — BUPROPION HYDROCHLORIDE 150 MG/1
300 TABLET, EXTENDED RELEASE ORAL DAILY
Refills: 0 | Status: DISCONTINUED | OUTPATIENT
Start: 2023-05-27 | End: 2023-05-30

## 2023-05-27 RX ORDER — BNT162B2 ORIGINAL AND OMICRON BA.4/BA.5 .1125; .1125 MG/2.25ML; MG/2.25ML
0.3 INJECTION, SUSPENSION INTRAMUSCULAR ONCE
Refills: 0 | Status: COMPLETED | OUTPATIENT
Start: 2023-05-27 | End: 2023-05-27

## 2023-05-27 RX ORDER — LEVOTHYROXINE SODIUM 125 MCG
137 TABLET ORAL DAILY
Refills: 0 | Status: DISCONTINUED | OUTPATIENT
Start: 2023-05-27 | End: 2023-05-30

## 2023-05-27 RX ORDER — ENOXAPARIN SODIUM 100 MG/ML
40 INJECTION SUBCUTANEOUS EVERY 24 HOURS
Refills: 0 | Status: DISCONTINUED | OUTPATIENT
Start: 2023-05-27 | End: 2023-05-30

## 2023-05-27 RX ORDER — ASPIRIN/CALCIUM CARB/MAGNESIUM 324 MG
81 TABLET ORAL DAILY
Refills: 0 | Status: DISCONTINUED | OUTPATIENT
Start: 2023-05-27 | End: 2023-05-31

## 2023-05-27 RX ORDER — ATORVASTATIN CALCIUM 80 MG/1
80 TABLET, FILM COATED ORAL AT BEDTIME
Refills: 0 | Status: DISCONTINUED | OUTPATIENT
Start: 2023-05-27 | End: 2023-05-31

## 2023-05-27 RX ORDER — PANTOPRAZOLE SODIUM 20 MG/1
40 TABLET, DELAYED RELEASE ORAL
Refills: 0 | Status: DISCONTINUED | OUTPATIENT
Start: 2023-05-27 | End: 2023-05-31

## 2023-05-27 RX ADMIN — Medication 1 TABLET(S): at 17:30

## 2023-05-27 RX ADMIN — ATORVASTATIN CALCIUM 80 MILLIGRAM(S): 80 TABLET, FILM COATED ORAL at 21:33

## 2023-05-27 RX ADMIN — BUPROPION HYDROCHLORIDE 300 MILLIGRAM(S): 150 TABLET, EXTENDED RELEASE ORAL at 21:33

## 2023-05-27 RX ADMIN — Medication 81 MILLIGRAM(S): at 17:30

## 2023-05-27 RX ADMIN — ENOXAPARIN SODIUM 40 MILLIGRAM(S): 100 INJECTION SUBCUTANEOUS at 12:36

## 2023-05-27 NOTE — DISCHARGE NOTE NURSING/CASE MANAGEMENT/SOCIAL WORK - PATIENT PORTAL LINK FT
You can access the FollowMyHealth Patient Portal offered by [facility] by registering at the following website: [email or web address]. By joining [email or web address]’s FollowMyHealth portal, you will also be able to view your health information using other applications (apps) compatible with our system.

## 2023-05-27 NOTE — PATIENT PROFILE ADULT - FALL HARM RISK - HARM RISK INTERVENTIONS
oral
Assistance with ambulation/Assistance OOB with selected safe patient handling equipment/Communicate Risk of Fall with Harm to all staff/Discuss with provider need for PT consult/Monitor gait and stability/Reinforce activity limits and safety measures with patient and family/Tailored Fall Risk Interventions/Visual Cue: Yellow wristband and red socks/Bed in lowest position, wheels locked, appropriate side rails in place/Call bell, personal items and telephone in reach/Instruct patient to call for assistance before getting out of bed or chair/Non-slip footwear when patient is out of bed/[place] to call system/Physically safe environment - no spills, clutter or unnecessary equipment/Purposeful Proactive Rounding/Room/bathroom lighting operational, light cord in reach
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
## 2023-05-27 NOTE — DISCHARGE NOTE NURSING/CASE MANAGEMENT/SOCIAL WORK - NSDCPEFALRISK_GEN_ALL_CORE
For information on Fall & Injury Prevention, visit: [email or web address] OR  [email or web address] OR  https://www.cdc.gov/steadi/patient.html

## 2023-05-27 NOTE — OCCUPATIONAL THERAPY INITIAL EVALUATION ADULT - LIVES WITH, PROFILE
Pt states she lives in private home with , 3 steps to enter, 1st floor setup, tub in bathroom. Pt I in ADLs and ambulation prior to admission

## 2023-05-27 NOTE — H&P ADULT - TIME BILLING
Preparation to see the patient, including reviewing the brain imaging in past one year, obtaining and/or reviewing the resident/ or PA note, separately obtained history, performing a medically appropriate examination and/or evaluation as documented in this encounter note, counseling and educating of the patient, ordering tests, documenting clinical information in patients electronic record , interpreting results (not separately reported) and communicating results to the patient.   Evaluation was performed on 5/27/23    Rt sided numbness in the setting of acute left punctate stroke, looks cardioembolic  ASA for now  NIHSS: 1  Etiology: Afib vs Hypercoagulable   Consider ILR   TTE  PT/OT

## 2023-05-27 NOTE — H&P ADULT - NSHPLABSRESULTS_GEN_ALL_CORE
Labs:                        12.9   11.67 )-----------( 212      ( 26 May 2023 20:34 )             38.8     05-26    134<L>  |  102  |  12  ----------------------------<  128<H>  4.5   |  20<L>  |  0.57    Ca    8.7      26 May 2023 20:34    TPro  6.5  /  Alb  4.0  /  TBili  0.2  /  DBili  x   /  AST  29  /  ALT  26  /  AlkPhos  97  05-26    CAPILLARY BLOOD GLUCOSE    POCT Blood Glucose.: 84 mg/dL (26 May 2023 19:28)    LIVER FUNCTIONS - ( 26 May 2023 20:34 )  Alb: 4.0 g/dL / Pro: 6.5 g/dL / ALK PHOS: 97 U/L / ALT: 26 U/L / AST: 29 U/L / GGT: x           PT/INR - ( 26 May 2023 21:12 )   PT: 10.8 sec;   INR: 0.94 ratio      PTT - ( 26 May 2023 21:12 )  PTT:30.4 sec    Radiology:  MRI brain with & without 5/26  Punctate focus of restricted diffusion in the left parietal lobe consistent with an acute infarct. There is no evidence of acute intracranial hemorrhage. No area of abnormal enhancement.
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
## 2023-05-27 NOTE — PATIENT PROFILE ADULT - NSPRESCRALCFREQ_GEN_A_NUR
Esophageal Dilation     A balloon dilator may be used to widen a stricture in the esophagus.   An esophageal dilation is a procedure used to widen a narrowed section of your esophagus. This is the tube that leads from your throat to your stomach. Narrowing (stricture) of the esophagus can cause problems. These include trouble swallowing. This sheet explains what to expect with esophageal dilation.   Why esophageal dilation is needed  Several problems can be treated with esophageal dilation. They include:  · Peptic stricture. This is caused by reflux esophagitis. With this problem, the esophagus is irritated by acid reflux (heartburn). This occurs when acid from your stomach flows back up into the esophagus. Stomach acid damages the lining of the esophagus. This leads to a buildup of scar tissue. As a result, the esophagus is narrowed.  · Schatzki’s ring. This is an abnormal ring of tissue. It forms where the esophagus meets the stomach. It can cause trouble swallowing. It can also cause food to get stuck in the esophagus. The cause of this condition is not known.  · Achalasia. This condition stops food and liquids from moving into your stomach from the esophagus. It affects the lower esophageal sphincter (LES). The LES is a muscular ring that opens (relaxes) when you swallow. With achalasia, the LES does not relax. This condition can also cause problems with peristalsis. This is the normal muscular action of the esophagus that moves food into the stomach.  · Eosinophilic esophagitis. This is a redness and swelling (inflammation) in the esophagus. It's caused by an environmental trigger such as a food allergy. It can lead to pain, trouble swallowing, and strictures.  · Less common causes of stricture. Other causes of stricture include radiation treatment and cancer.  Before you have esophageal dilation  · Tell your provider about any medicines you take. This includes prescription medicines, over-the-counter  medicines, herbs, vitamins, and other supplements. Be sure to mention aspirin or any blood thinners you’re taking.  · Let your provider know if you need to take antibiotics before dental procedures. You may need to take them before esophageal dilation as well.  · Tell your provider about any health conditions you have, such as heart or lung disease. Also mention any allergies to medicines.  · You’ll need to have an empty stomach for the procedure. Follow your provider’s instructions for not eating and drinking before the procedure.  · Arrange to have a family member or friend drive you home after the procedure.    During the procedure  · You may be given local anesthesia to numb your throat. You’ll also likely be given medicine to relax you. The procedure takes about 15 minutes. It does not cause trouble breathing.  · A tube called an endoscope (scope) is used. This is a narrow tube with a tiny light and camera at the end. The scope is inserted through your mouth and into your esophagus. It lets your provider see inside your esophagus. To help guide your provider, an imaging method called fluoroscopy may also be used. This creates a moving X-ray image on a computer screen.   · Next, special tiny tools are carefully guided through your mouth and down into the esophagus. They widen the stricture and are then removed. Different types of instruments are used. The type used depends on the size and cause of the stricture. Types include:  ? Balloon dilator. A tiny empty balloon is put into the stricture using an endoscope. The balloon is slowly filled with air. The air is removed from the balloon when the stricture is widened to the right size. Balloon dilators are used to treat many types of strictures.  ? Guided wire dilator. A thin wire is eased down the esophagus. A small tube that’s wider on one end is guided down the wire. It's put into the stricture to stretch it. These dilators are used to treat all kinds of  strictures.  ? Bougies. These are weighted, cone-shaped tubes. Starting with smaller cones, your provider uses increasingly larger cones until the stricture is stretched the right amount. Bougies are often used to treat strictures that are simple (short, straight, and not very narrow).    After the procedure  · You’ll be watched closely until your provider says you’re ready to go home. You’ll need to have a friend or family member drive you home.  · You may have a sore throat for the rest of the day.  · You may have pain behind your breastbone for a short time afterwards.  · You can start drinking fluids again after the numbness in your throat goes away. You can resume eating the same day or the next day.    Risks and possible complications  Risks and possible complications for esophageal dilation include:  · Infection  · A tear or hole in the esophagus lining, causing bleeding and possibly needing surgery to fix  · Risks of anesthesia  Follow-up  You may need to have the procedure repeated one or more times. This depends on the cause and extent of the narrowing. Repeat procedures can allow the dilation to take place more slowly. This reduces the risks of the procedure.   If your stricture was caused by reflux esophagitis, you’ll likely need to take medicine to treat that condition. Your provider will tell you more.   When to call your provider  Call your healthcare provider right away if you have any of the following after the procedure:   · Fever of 100.4°F (38.0°C) or higher, or as directed by your provider  · Chest pain  · Trouble swallowing  · Vomiting blood or material that looks like coffee grounds  · Bleeding  · Black, tarry, or bloody stools  [employer] last reviewed this educational content on 6/1/2019 © 2000-2020 The [employer]. [address] [place] [address], [place] [address]. All rights reserved. This information is not intended as a substitute for professional medical care. Always follow your  healthcare professional's instructions.        Anesthesia: Monitored Anesthesia Care (MAC)    You’re due to have surgery. During surgery, you’ll be given medicine called anesthesia. This will keep you comfortable and pain-free. Your surgeon will use monitored anesthesia care (MAC). This sheet tells you more about this type of anesthesia.   What is monitored anesthesia care?  MAC keeps you very drowsy during surgery. You may be awake, but you will likely not remember much. And you won’t feel pain. With MAC, medicines are given through an IV line into a vein in your arm or hand. A local anesthetic will usually be injected into the skin and muscle around the surgical site to numb it. The anesthesia provider monitors you during the procedure. He or she checks your heart rate and rhythm, blood pressure, and blood oxygen level.   Anesthesia tools and medicines that may be near you during your procedure  You will likely have:  · A pulse oximeter on the end of your finger. This measures your blood oxygen level.  · Electrocardiography leads (electrodes) on your chest. These record your heart rate and rhythm.  · Medicines given through an IV. These relax you and prevent pain. You may be awake or sleep lightly. If you have local anesthetic, it's injected directly into your skin.  · A face mask to give you oxygen, if needed.  Possible risks  MAC has some risks. These include:  · Breathing problems  · Nausea and vomiting  · Allergic reaction to the anesthetic   Anesthesia safety  Tips for anesthesia safety include the following:   · Follow all instructions you are given for how long not to eat or drink before your procedure.  · Be sure your healthcare provider knows what medicines you take, especially any anti-inflammatory medicine or blood thinners. This includes aspirin and any other over-the-counter medicines, herbs, and supplements.  · Have an adult family member or friend drive you home after the procedure.  · For the first  24 hours after your surgery:  ? Don't drive or use heavy equipment.  ? Don't make important decisions or sign documents.  ? Don't drink alcohol.  ? Have someone stay with you, if possible. They can watch for problems and help keep you safe.  [employer] last reviewed this educational content on 10/1/2019  © 7281-5780 The [employer]. [address] [place] [address]. All rights reserved. This information is not intended as a substitute for professional medical care. Always follow your healthcare professional's instructions.        Upper GI Endoscopy     During endoscopy, a long, flexible tube is used to view the inside of your upper GI tract.    An upper GI endoscopy lets your healthcare provider look right into the beginning of your gastrointestinal (GI) tract. The esophagus, stomach, and the first part of the small intestine (the duodenum) make up the upper GI tract.    Before the test  Follow these and any other instructions you are given before your endoscopy. If you don’t follow the healthcare provider’s instructions carefully, the test may need to be canceled or done over:   · Follow any directions you are given for not eating or drinking before your test. In some cases, you may be able to take medicines with sips of water until 2 hours before the test. Talk with your provider about this.   · Bring your X-rays and any other test results you have.  · Because you will be sedated, arrange for an adult to drive you home after the exam.  · Tell your provider before the exam if you are taking any medicines. This includes any over-the-counter and prescription medicines, vitamins, herbs, and supplements. Some medicines may be adjusted or stopped before the test. Don't stop any medicine unless directed by your provider.  · Tell your provider if you have any health problems.  The procedure  Here is what to expect:  · You will lie on the endoscopy table. People often lie on their left side.  · You will be  placed on a heart monitor and given oxygen.  · Your throat may be numbed with a spray or gargle. You are given medicine through an IV (intravenous) line that will help you relax and stay comfortable. You may be awake or asleep during the test.  · The healthcare provider will put the endoscope in your mouth and down your esophagus. It is thinner than most pieces of food that you swallow. It won't affect your breathing. The medicine helps keep you from gagging.  · Air is put into your GI tract to expand it. It can make you burp.  · During the procedure, the healthcare provider can take tissue samples (biopsies) and remove abnormalities such as polyps. The provider can also treat abnormalities using tools placed through the endoscope. You will not feel this.   · The endoscope carries images of your upper GI tract to a video screen. If you are awake, you may be able to look at the images.  · After the procedure is done, you will rest for a time. An adult must drive you home.  · After the procedure, you may feel gassy for a few hours. You may have a sore throat which should improve within a day or 2.  When to call your healthcare provider  Call your healthcare provider if you have:   · Black or tarry stools, or blood in your stool  · Fever  · Pain in your belly that does not go away  · Upset stomach and vomiting, or vomiting blood  [employer] last reviewed this educational content on 6/1/2019  © 1047-6558 [employer]. [address] [place] [address], [place] [address]. All rights reserved. This information is not intended as a substitute for professional medical care. Always follow your healthcare professional's instructions.         2-3 times a week

## 2023-05-27 NOTE — PHYSICAL THERAPY INITIAL EVALUATION ADULT - ADDITIONAL COMMENTS
Pt lives with spouse in private home with 3 steps to enter. No stairs inside. PTA, pt was independent with all mobility without use of assistive device.

## 2023-05-27 NOTE — PHYSICAL THERAPY INITIAL EVALUATION ADULT - PLANNED THERAPY INTERVENTIONS, PT EVAL
GOAL: Pt will perform 3 steps with +railing as needed independently within 3-4weeks./bed mobility training/gait training/transfer training

## 2023-05-27 NOTE — DISCHARGE NOTE NURSING/CASE MANAGEMENT/SOCIAL WORK - NSSCNAMETXT_GEN_ALL_CORE
[facility]
Detail Level: Detailed
Quality 137: Melanoma: Continuity Of Care - Recall System: Patient information entered into a recall system that includes: target date for the next exam specified AND a process to follow up with patients regarding missed or unscheduled appointments

## 2023-05-27 NOTE — OCCUPATIONAL THERAPY INITIAL EVALUATION ADULT - LEVEL OF INDEPENDENCE: SIT/STAND, REHAB EVAL
PRINCIPAL PROCEDURE  Procedure: Arthroplasty, shoulder, left, total, reverse  Findings and Treatment:       
independent

## 2023-05-27 NOTE — PATIENT PROFILE ADULT - FUNCTIONAL SCREEN CURRENT LEVEL: SWALLOWING (IF SCORE 2 OR MORE FOR ANY ITEM, CONSULT REHAB SERVICES), MLM)
probably screen neg except for fever  URI most likely but has green mucus  Advil or tylenol for fever  floase for congestion  zpack     0 = swallows foods/liquids without difficulty

## 2023-05-27 NOTE — OCCUPATIONAL THERAPY INITIAL EVALUATION ADULT - ORIENTATION, REHAB EVAL
pt with open stoma, states speaking valve is leaking, able to mouth words and write needs/oriented to person, place, time and situation
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
## 2023-05-27 NOTE — PHYSICAL THERAPY INITIAL EVALUATION ADULT - GROSSLY INTACT, SENSORY
except pt notes lea LE increased numbness/tingling. RLE > LLE. Numbness worst in lea feet/Grossly Intact
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
## 2023-05-27 NOTE — H&P ADULT - NSHPPHYSICALEXAM_GEN_ALL_CORE
Neurologic Exam:  Mental status - Awake, Alert, Oriented to person, place, and time. Speech fluent, repetition and naming intact. Follows simple commands.    Cranial nerves - PERRLA, VFF, EOMI, face sensation (V1-V3) intact b/l, facial strength intact without asymmetry b/l, hearing intact b/l, palate with symmetric elevation, trapezius 5/5 strength b/l, tongue midline on protrusion with full lateral movement    Motor - Normal bulk and tone throughout. No pronator drift.  Strength testing            Deltoid      Biceps      Triceps     Wrist Extension         R            5                 5               5                     5                              5                       L             5                 5               5                     5                              5                                    Hip Flexion   Knee Flexion    Knee Extension  Dorsiflexion    Plantar Flexion  R              5                           5                       5                             5                            4+                           L              5                           5                        5                             5                            5                             Sensation - decreased sensation to light touch on RLE    DTR's -             Biceps      Triceps     Brachioradialis      Patellar    Ankle    Toes/plantar response  R             2+             2+                  2+                       1+            1+                 mute  L              2+             2+                 2+                        1+           1+                 mute    Coordination - Finger to Nose intact b/l. No tremors appreciated    Gait and station - Normal casual gait.

## 2023-05-28 LAB
A1C WITH ESTIMATED AVERAGE GLUCOSE RESULT: 5.9 % — HIGH (ref 4–5.6)
ANION GAP SERPL CALC-SCNC: 13 MMOL/L — SIGNIFICANT CHANGE UP (ref 5–17)
APTT BLD: 31.4 SEC — SIGNIFICANT CHANGE UP (ref 27.5–35.5)
BUN SERPL-MCNC: 12 MG/DL — SIGNIFICANT CHANGE UP (ref 7–23)
CALCIUM SERPL-MCNC: 8.5 MG/DL — SIGNIFICANT CHANGE UP (ref 8.4–10.5)
CHLORIDE SERPL-SCNC: 108 MMOL/L — SIGNIFICANT CHANGE UP (ref 96–108)
CHOLEST SERPL-MCNC: 187 MG/DL — SIGNIFICANT CHANGE UP
CO2 SERPL-SCNC: 22 MMOL/L — SIGNIFICANT CHANGE UP (ref 22–31)
CREAT SERPL-MCNC: 0.58 MG/DL — SIGNIFICANT CHANGE UP (ref 0.5–1.3)
EGFR: 97 ML/MIN/1.73M2 — SIGNIFICANT CHANGE UP
ESTIMATED AVERAGE GLUCOSE: 123 MG/DL — HIGH (ref 68–114)
GLUCOSE SERPL-MCNC: 96 MG/DL — SIGNIFICANT CHANGE UP (ref 70–99)
HCT VFR BLD CALC: 40.1 % — SIGNIFICANT CHANGE UP (ref 34.5–45)
HCV AB S/CO SERPL IA: 0.21 S/CO — SIGNIFICANT CHANGE UP (ref 0–0.99)
HCV AB SERPL-IMP: SIGNIFICANT CHANGE UP
HDLC SERPL-MCNC: 94 MG/DL — SIGNIFICANT CHANGE UP
HGB BLD-MCNC: 12.8 G/DL — SIGNIFICANT CHANGE UP (ref 11.5–15.5)
INR BLD: 0.97 RATIO — SIGNIFICANT CHANGE UP (ref 0.88–1.16)
LIPID PNL WITH DIRECT LDL SERPL: 68 MG/DL — SIGNIFICANT CHANGE UP
MCHC RBC-ENTMCNC: 31.8 PG — SIGNIFICANT CHANGE UP (ref 27–34)
MCHC RBC-ENTMCNC: 31.9 GM/DL — LOW (ref 32–36)
MCV RBC AUTO: 99.8 FL — SIGNIFICANT CHANGE UP (ref 80–100)
NON HDL CHOLESTEROL: 94 MG/DL — SIGNIFICANT CHANGE UP
NRBC # BLD: 0 /100 WBCS — SIGNIFICANT CHANGE UP (ref 0–0)
PLATELET # BLD AUTO: 212 K/UL — SIGNIFICANT CHANGE UP (ref 150–400)
POTASSIUM SERPL-MCNC: 4.4 MMOL/L — SIGNIFICANT CHANGE UP (ref 3.5–5.3)
POTASSIUM SERPL-SCNC: 4.4 MMOL/L — SIGNIFICANT CHANGE UP (ref 3.5–5.3)
PROTHROM AB SERPL-ACNC: 11.3 SEC — SIGNIFICANT CHANGE UP (ref 10.5–13.4)
RBC # BLD: 4.02 M/UL — SIGNIFICANT CHANGE UP (ref 3.8–5.2)
RBC # FLD: 13.4 % — SIGNIFICANT CHANGE UP (ref 10.3–14.5)
SODIUM SERPL-SCNC: 143 MMOL/L — SIGNIFICANT CHANGE UP (ref 135–145)
TRIGL SERPL-MCNC: 130 MG/DL — SIGNIFICANT CHANGE UP
WBC # BLD: 4.91 K/UL — SIGNIFICANT CHANGE UP (ref 3.8–10.5)
WBC # FLD AUTO: 4.91 K/UL — SIGNIFICANT CHANGE UP (ref 3.8–10.5)

## 2023-05-28 RX ADMIN — Medication 81 MILLIGRAM(S): at 12:00

## 2023-05-28 RX ADMIN — PANTOPRAZOLE SODIUM 40 MILLIGRAM(S): 20 TABLET, DELAYED RELEASE ORAL at 05:11

## 2023-05-28 RX ADMIN — Medication 137 MICROGRAM(S): at 05:11

## 2023-05-28 RX ADMIN — ATORVASTATIN CALCIUM 80 MILLIGRAM(S): 80 TABLET, FILM COATED ORAL at 21:13

## 2023-05-28 RX ADMIN — Medication 1 TABLET(S): at 12:00

## 2023-05-28 RX ADMIN — ENOXAPARIN SODIUM 40 MILLIGRAM(S): 100 INJECTION SUBCUTANEOUS at 12:00

## 2023-05-28 RX ADMIN — BUPROPION HYDROCHLORIDE 300 MILLIGRAM(S): 150 TABLET, EXTENDED RELEASE ORAL at 12:22
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
## 2023-05-29 RX ADMIN — Medication 1 TABLET(S): at 11:33

## 2023-05-29 RX ADMIN — BUPROPION HYDROCHLORIDE 300 MILLIGRAM(S): 150 TABLET, EXTENDED RELEASE ORAL at 11:33

## 2023-05-29 RX ADMIN — PANTOPRAZOLE SODIUM 40 MILLIGRAM(S): 20 TABLET, DELAYED RELEASE ORAL at 11:33

## 2023-05-29 RX ADMIN — ENOXAPARIN SODIUM 40 MILLIGRAM(S): 100 INJECTION SUBCUTANEOUS at 12:14

## 2023-05-29 RX ADMIN — Medication 137 MICROGRAM(S): at 05:45

## 2023-05-29 RX ADMIN — ATORVASTATIN CALCIUM 80 MILLIGRAM(S): 80 TABLET, FILM COATED ORAL at 21:26

## 2023-05-29 RX ADMIN — Medication 81 MILLIGRAM(S): at 11:33
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
## 2023-05-30 ENCOUNTER — TRANSCRIPTION ENCOUNTER (OUTPATIENT)
Age: 73
End: 2023-05-30

## 2023-05-30 DIAGNOSIS — C32.9 MALIGNANT NEOPLASM OF LARYNX, UNSPECIFIED: ICD-10-CM

## 2023-05-30 DIAGNOSIS — C50.919 MALIGNANT NEOPLASM OF UNSPECIFIED SITE OF UNSPECIFIED FEMALE BREAST: ICD-10-CM

## 2023-05-30 DIAGNOSIS — E03.9 HYPOTHYROIDISM, UNSPECIFIED: ICD-10-CM

## 2023-05-30 DIAGNOSIS — I63.9 CEREBRAL INFARCTION, UNSPECIFIED: ICD-10-CM

## 2023-05-30 LAB — D DIMER BLD IA.RAPID-MCNC: <150 NG/ML DDU — SIGNIFICANT CHANGE UP

## 2023-05-30 PROCEDURE — 99221 1ST HOSP IP/OBS SF/LOW 40: CPT | Mod: FS

## 2023-05-30 PROCEDURE — 93306 TTE W/DOPPLER COMPLETE: CPT | Mod: 26

## 2023-05-30 PROCEDURE — 99239 HOSP IP/OBS DSCHRG MGMT >30: CPT

## 2023-05-30 RX ORDER — MONTELUKAST 4 MG/1
1 TABLET, CHEWABLE ORAL
Qty: 0 | Refills: 0 | DISCHARGE
Start: 2023-05-30

## 2023-05-30 RX ORDER — BREXPIPRAZOLE 0.25 MG/1
1 TABLET ORAL
Qty: 0 | Refills: 0 | DISCHARGE

## 2023-05-30 RX ORDER — GABAPENTIN 400 MG/1
1 CAPSULE ORAL
Qty: 0 | Refills: 0 | DISCHARGE
Start: 2023-05-30

## 2023-05-30 RX ORDER — LEVOTHYROXINE SODIUM 125 MCG
1 TABLET ORAL
Qty: 0 | Refills: 0 | DISCHARGE

## 2023-05-30 RX ORDER — GABAPENTIN 400 MG/1
300 CAPSULE ORAL
Refills: 0 | Status: DISCONTINUED | OUTPATIENT
Start: 2023-05-30 | End: 2023-05-31

## 2023-05-30 RX ORDER — ACETAMINOPHEN 500 MG
2 TABLET ORAL
Qty: 0 | Refills: 0 | DISCHARGE
Start: 2023-05-30

## 2023-05-30 RX ORDER — ACETAMINOPHEN 500 MG
650 TABLET ORAL EVERY 6 HOURS
Refills: 0 | Status: DISCONTINUED | OUTPATIENT
Start: 2023-05-30 | End: 2023-05-31

## 2023-05-30 RX ORDER — MONTELUKAST 4 MG/1
10 TABLET, CHEWABLE ORAL DAILY
Refills: 0 | Status: DISCONTINUED | OUTPATIENT
Start: 2023-05-30 | End: 2023-05-31

## 2023-05-30 RX ORDER — ASPIRIN/CALCIUM CARB/MAGNESIUM 324 MG
1 TABLET ORAL
Qty: 0 | Refills: 0 | DISCHARGE
Start: 2023-05-30

## 2023-05-30 RX ORDER — LEVOTHYROXINE SODIUM 125 MCG
1 TABLET ORAL
Refills: 0 | DISCHARGE

## 2023-05-30 RX ORDER — BUPROPION HYDROCHLORIDE 150 MG/1
1 TABLET, EXTENDED RELEASE ORAL
Qty: 0 | Refills: 0 | DISCHARGE

## 2023-05-30 RX ORDER — MONTELUKAST 4 MG/1
1 TABLET, CHEWABLE ORAL
Refills: 0 | DISCHARGE

## 2023-05-30 RX ORDER — METHYLPHENIDATE HCL 5 MG
0 TABLET ORAL
Qty: 0 | Refills: 0 | DISCHARGE

## 2023-05-30 RX ORDER — LORATADINE 10 MG/1
10 TABLET ORAL DAILY
Refills: 0 | Status: DISCONTINUED | OUTPATIENT
Start: 2023-05-30 | End: 2023-05-31

## 2023-05-30 RX ORDER — LEVOTHYROXINE SODIUM 125 MCG
100 TABLET ORAL DAILY
Refills: 0 | Status: DISCONTINUED | OUTPATIENT
Start: 2023-05-30 | End: 2023-05-31

## 2023-05-30 RX ORDER — BUPROPION HYDROCHLORIDE 150 MG/1
150 TABLET, EXTENDED RELEASE ORAL DAILY
Refills: 0 | Status: DISCONTINUED | OUTPATIENT
Start: 2023-05-30 | End: 2023-05-31

## 2023-05-30 RX ORDER — LEVOTHYROXINE SODIUM 125 MCG
1 TABLET ORAL
Qty: 0 | Refills: 0 | DISCHARGE
Start: 2023-05-30

## 2023-05-30 RX ORDER — ARIPIPRAZOLE 15 MG/1
5 TABLET ORAL DAILY
Refills: 0 | Status: DISCONTINUED | OUTPATIENT
Start: 2023-05-30 | End: 2023-05-31

## 2023-05-30 RX ORDER — FEXOFENADINE HCL 30 MG
1 TABLET ORAL
Qty: 0 | Refills: 0 | DISCHARGE

## 2023-05-30 RX ADMIN — Medication 650 MILLIGRAM(S): at 05:44

## 2023-05-30 RX ADMIN — LORATADINE 10 MILLIGRAM(S): 10 TABLET ORAL at 17:15

## 2023-05-30 RX ADMIN — ARIPIPRAZOLE 5 MILLIGRAM(S): 15 TABLET ORAL at 11:37

## 2023-05-30 RX ADMIN — Medication 81 MILLIGRAM(S): at 11:20

## 2023-05-30 RX ADMIN — Medication 650 MILLIGRAM(S): at 06:16

## 2023-05-30 RX ADMIN — ATORVASTATIN CALCIUM 80 MILLIGRAM(S): 80 TABLET, FILM COATED ORAL at 21:25

## 2023-05-30 RX ADMIN — Medication 1 TABLET(S): at 11:20

## 2023-05-30 RX ADMIN — MONTELUKAST 10 MILLIGRAM(S): 4 TABLET, CHEWABLE ORAL at 11:20

## 2023-05-30 RX ADMIN — ENOXAPARIN SODIUM 40 MILLIGRAM(S): 100 INJECTION SUBCUTANEOUS at 11:19

## 2023-05-30 RX ADMIN — Medication 137 MICROGRAM(S): at 05:11

## 2023-05-30 RX ADMIN — BUPROPION HYDROCHLORIDE 150 MILLIGRAM(S): 150 TABLET, EXTENDED RELEASE ORAL at 11:36

## 2023-05-30 RX ADMIN — GABAPENTIN 300 MILLIGRAM(S): 400 CAPSULE ORAL at 17:15

## 2023-05-30 NOTE — DISCHARGE NOTE PROVIDER - PROVIDER TOKENS
PROVIDER:[TOKEN:[[id number]],FOLLOWUP:[2 weeks]],PROVIDER:[TOKEN:[[id number]],FOLLOWUP:[2 weeks]] PROVIDER:[TOKEN:[[id number]],FOLLOWUP:[2 weeks]],PROVIDER:[TOKEN:[[id number]],FOLLOWUP:[2 weeks]],PROVIDER:[TOKEN:[[id number]],FOLLOWUP:[2 weeks]]
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
## 2023-05-30 NOTE — CONSULT NOTE ADULT - PROBLEM SELECTOR RECOMMENDATION 9
ASA and statin  No events on tele thus far   Stroke team recommending ILR t o rule out occult PAF. She has received chest radiation in the past which would increase risk of future afib risk    check TTE for now
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
## 2023-05-30 NOTE — DISCHARGE NOTE PROVIDER - NSDCMRMEDTOKEN_GEN_ALL_CORE_FT
albuterol 0.63 mg/3 mL (0.021%) inhalation solution: 1 each by nebulizer every 6 hours   Allegra 180 mg oral tablet: 1 tab(s) orally once a day  buPROPion 300 mg/24 hours (XL) oral tablet, extended release: 1 tab(s) orally every 24 hours  clonazePAM 2 mg oral tablet: 1 tab(s) orally 3 times a day  doxepin: 50 milligram(s) orally once a day (at bedtime)  gabapentin 300 mg oral capsule: 1 cap(s) orally 2 times a day  levothyroxine 137 mcg (0.137 mg) oral tablet: 1 tab(s) orally once a day  multivitamin: 1 tab(s) orally once a day. last dsoe 5/15/2018  omeprazole 40 mg oral delayed release capsule: 1 cap(s) orally once a day   predniSONE 20 mg oral tablet: 3 tab(s) orally once a day   ProAir HFA 90 mcg/inh inhalation aerosol: 2 puff(s) inhaled every 6 hours, As Needed  Rexulti 2 mg oral tablet: 1 tab(s) orally once a day (at bedtime)  Ritalin:   Zithromax Z-Herrera 250 mg oral tablet: 1 each orally once a day    acetaminophen 325 mg oral tablet: 2 tab(s) orally every 6 hours As needed Mild Pain (1 - 3), Moderate Pain (4 - 6)  albuterol 0.63 mg/3 mL (0.021%) inhalation solution: 1 each by nebulizer every 6 hours   ARIPiprazole 5 mg oral tablet: 1 orally once a day (at bedtime)  buPROPion 75 mg oral tablet: 1 orally once a day  clonazePAM 2 mg oral tablet: 1 tab(s) orally 3 times a day as needed for  anxiety  doxepin: 50 milligram(s) orally once a day (at bedtime)  gabapentin 300 mg oral capsule: 1 cap(s) orally 2 times a day  levothyroxine 100 mcg (0.1 mg) oral tablet: 1 tab(s) orally once a day  loratadine 10 mg oral capsule: 1 orally once a day  montelukast 10 mg oral tablet: 1 tab(s) orally once a day  Multiple Vitamins oral tablet: 1 tab(s) orally once a day  omeprazole 40 mg oral delayed release capsule: 1 cap(s) orally once a day    acetaminophen 325 mg oral tablet: 2 tab(s) orally every 6 hours As needed Mild Pain (1 - 3), Moderate Pain (4 - 6)  albuterol 0.63 mg/3 mL (0.021%) inhalation solution: 1 each by nebulizer every 6 hours   ARIPiprazole 5 mg oral tablet: 1 orally once a day (at bedtime)  aspirin 81 mg oral tablet, chewable: 1 tab(s) orally once a day  buPROPion 75 mg oral tablet: 1 orally once a day  clonazePAM 2 mg oral tablet: 1 tab(s) orally 3 times a day as needed for  anxiety  doxepin: 50 milligram(s) orally once a day (at bedtime)  gabapentin 300 mg oral capsule: 1 cap(s) orally 2 times a day  levothyroxine 100 mcg (0.1 mg) oral tablet: 1 tab(s) orally once a day  loratadine 10 mg oral capsule: 1 orally once a day  montelukast 10 mg oral tablet: 1 tab(s) orally once a day  Multiple Vitamins oral tablet: 1 tab(s) orally once a day  omeprazole 40 mg oral delayed release capsule: 1 cap(s) orally once a day
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
## 2023-05-30 NOTE — CONSULT NOTE ADULT - PROBLEM SELECTOR RECOMMENDATION 2
s/p radiation in 2006   given that, theres higher risk of Afib as well as ostial coronary stenosis   outpt ischemic eval as well
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
## 2023-05-30 NOTE — DISCHARGE NOTE PROVIDER - CARE PROVIDER_API CALL
Rose Young  NP in Family Health  [address], [address]  [place] [address]  Phone: [phone]  Fax: [phone]  Follow Up Time: 2 weeks    Lavelle Kan  [address], [address]  [place] [address]  Phone: [phone]  Fax: [phone]  Follow Up Time: 2 weeks   Rose Young  NP in Family Health  [address], [address]  [place] [address]  Phone: [phone]  Fax: [phone]  Follow Up Time: 2 weeks    Lavelle Kan  [address], [address]  [place] [address]  Phone: [phone]  Fax: [phone]  Follow Up Time: 2 weeks    Nissenbaum, Michael A.  Neurology  [address], [address]  [place] [address]  Phone: [phone]  Fax: [phone]  Follow Up Time: 2 weeks

## 2023-05-30 NOTE — DISCHARGE NOTE PROVIDER - NSDCCPCAREPLAN_GEN_ALL_CORE_FT
PRINCIPAL DISCHARGE DIAGNOSIS  Diagnosis: Stroke  Assessment and Plan of Treatment: Please follow up with neurologist in 1-2 weeks. The office will call you to schedule an appointment, if you do not hear from them please call [phone]. Continue taking medications as prescribed. If you were prescribed a statin for your cholesterol please make sure you have your liver enzymes checked with your primary care physician. Monitor your blood pressure. Reduce fat, cholesterol and salt in your diet. Increase intake of fruits and vegetables. Limit alcohol to minimum and do not smoke. You may be at risk for falling, make changes to your home to help you walk easier. Keep up to date on vaccinations.  If you experience any symptoms of facial drooping, slurred speech, arm or leg weakness, severe headache, vision changes or any worsening symptoms, notify provider immediately and return to ER.

## 2023-05-30 NOTE — DISCHARGE NOTE PROVIDER - NSDCFUSCHEDAPPT_GEN_ALL_CORE_FT
[facility]  [address] [address]  Scheduled Appointment: 06/16/2023

## 2023-05-31 VITALS
SYSTOLIC BLOOD PRESSURE: 112 MMHG | RESPIRATION RATE: 20 BRPM | DIASTOLIC BLOOD PRESSURE: 56 MMHG | HEART RATE: 85 BPM | TEMPERATURE: 98 F | OXYGEN SATURATION: 97 %

## 2023-05-31 PROCEDURE — 97161 PT EVAL LOW COMPLEX 20 MIN: CPT

## 2023-05-31 PROCEDURE — 85027 COMPLETE CBC AUTOMATED: CPT

## 2023-05-31 PROCEDURE — 82962 GLUCOSE BLOOD TEST: CPT

## 2023-05-31 PROCEDURE — 70496 CT ANGIOGRAPHY HEAD: CPT | Mod: MA

## 2023-05-31 PROCEDURE — 83036 HEMOGLOBIN GLYCOSYLATED A1C: CPT

## 2023-05-31 PROCEDURE — C1764: CPT

## 2023-05-31 PROCEDURE — 80048 BASIC METABOLIC PNL TOTAL CA: CPT

## 2023-05-31 PROCEDURE — 85610 PROTHROMBIN TIME: CPT

## 2023-05-31 PROCEDURE — 36415 COLL VENOUS BLD VENIPUNCTURE: CPT

## 2023-05-31 PROCEDURE — 80053 COMPREHEN METABOLIC PANEL: CPT

## 2023-05-31 PROCEDURE — 93005 ELECTROCARDIOGRAM TRACING: CPT

## 2023-05-31 PROCEDURE — 99239 HOSP IP/OBS DSCHRG MGMT >30: CPT

## 2023-05-31 PROCEDURE — 86803 HEPATITIS C AB TEST: CPT

## 2023-05-31 PROCEDURE — 97165 OT EVAL LOW COMPLEX 30 MIN: CPT

## 2023-05-31 PROCEDURE — 33285 INSJ SUBQ CAR RHYTHM MNTR: CPT

## 2023-05-31 PROCEDURE — 93306 TTE W/DOPPLER COMPLETE: CPT

## 2023-05-31 PROCEDURE — 80061 LIPID PANEL: CPT

## 2023-05-31 PROCEDURE — 93308 TTE F-UP OR LMTD: CPT

## 2023-05-31 PROCEDURE — 85025 COMPLETE CBC W/AUTO DIFF WBC: CPT

## 2023-05-31 PROCEDURE — 85379 FIBRIN DEGRADATION QUANT: CPT

## 2023-05-31 PROCEDURE — 85730 THROMBOPLASTIN TIME PARTIAL: CPT

## 2023-05-31 PROCEDURE — 70498 CT ANGIOGRAPHY NECK: CPT | Mod: MA

## 2023-05-31 PROCEDURE — 99291 CRITICAL CARE FIRST HOUR: CPT

## 2023-05-31 RX ORDER — ASPIRIN/CALCIUM CARB/MAGNESIUM 324 MG
1 TABLET ORAL
Qty: 0 | Refills: 0 | DISCHARGE
Start: 2023-05-31

## 2023-05-31 RX ADMIN — Medication 81 MILLIGRAM(S): at 13:29

## 2023-05-31 RX ADMIN — GABAPENTIN 300 MILLIGRAM(S): 400 CAPSULE ORAL at 06:28

## 2023-05-31 RX ADMIN — Medication 1 TABLET(S): at 13:28

## 2023-05-31 RX ADMIN — PANTOPRAZOLE SODIUM 40 MILLIGRAM(S): 20 TABLET, DELAYED RELEASE ORAL at 06:28

## 2023-05-31 RX ADMIN — Medication 100 MICROGRAM(S): at 06:28

## 2023-05-31 RX ADMIN — ARIPIPRAZOLE 5 MILLIGRAM(S): 15 TABLET ORAL at 13:29

## 2023-05-31 RX ADMIN — MONTELUKAST 10 MILLIGRAM(S): 4 TABLET, CHEWABLE ORAL at 13:29

## 2023-05-31 RX ADMIN — LORATADINE 10 MILLIGRAM(S): 10 TABLET ORAL at 13:29

## 2023-05-31 RX ADMIN — BUPROPION HYDROCHLORIDE 150 MILLIGRAM(S): 150 TABLET, EXTENDED RELEASE ORAL at 13:28
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
## 2023-05-31 NOTE — PROGRESS NOTE ADULT - TIME BILLING
Advanced care planning was discussed with patient and family.  Advanced care planning forms were reviewed and discussed as appropriate.  Differential diagnosis and plan of care discussed with patient after the evaluation.   Pain assessed and judicious use of narcotics when appropriate was discussed.  Importance of Fall prevention discussed.  Counseling on Smoking and Alcohol cessation was offered when appropriate.  Counseling on Diet, exercise, and medication compliance was done.
Preparation to see the patient, including reviewing the brain imaging in past one year, obtaining and/or reviewing the resident/ or PA note, separately obtained history, performing a medically appropriate examination and/or evaluation as documented in this encounter note, counseling and educating of the patient, ordering tests, documenting clinical information in patients electronic record , interpreting results (not separately reported) and communicating results to the patient.   Evaluation was performed on 5/28/23    Punctate left embolic MCA stroke, acute  R/o Afib  Clinically stable  ILR as OP  TTE  Cont Aspirin   PT/OT
Preparation to see the patient, including reviewing the brain imaging in past one year, obtaining and/or reviewing the resident/ or PA note, separately obtained history, performing a medically appropriate examination and/or evaluation as documented in this encounter note, counseling and educating of the patient, ordering tests, documenting clinical information in patients electronic record , interpreting results (not separately reported) and communicating results to the patient.   Evaluation was performed on 5/29/23    Punctate left embolic MCA stroke, acute  R/o Afib  Clinically stable  ILR as OP  TTE  Cont Aspirin   PT/OT
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
## 2023-05-31 NOTE — PROGRESS NOTE ADULT - SUBJECTIVE AND OBJECTIVE BOX
Subjective: Patient seen and examined. No new events except as noted.     REVIEW OF SYSTEMS:    CONSTITUTIONAL: No weakness, fevers or chills  EYES/ENT: No visual changes;  No vertigo or throat pain   NECK: No pain or stiffness  RESPIRATORY: No cough, wheezing, hemoptysis; No shortness of breath  CARDIOVASCULAR: No chest pain or palpitations  GASTROINTESTINAL: No abdominal or epigastric pain. No nausea, vomiting, or hematemesis; No diarrhea or constipation. No melena or hematochezia.  GENITOURINARY: No dysuria, frequency or hematuria  NEUROLOGICAL: No numbness or weakness  SKIN: No itching, burning, rashes, or lesions   All other review of systems is negative unless indicated above.    MEDICATIONS:  MEDICATIONS  (STANDING):  ARIPiprazole 5 milliGRAM(s) Oral daily  aspirin  chewable 81 milliGRAM(s) Oral daily  atorvastatin 80 milliGRAM(s) Oral at bedtime  buPROPion XL (24-Hour) . 150 milliGRAM(s) Oral daily  gabapentin 300 milliGRAM(s) Oral two times a day  levothyroxine 100 MICROGram(s) Oral daily  loratadine 10 milliGRAM(s) Oral daily  montelukast 10 milliGRAM(s) Oral daily  multivitamin 1 Tablet(s) Oral daily  pantoprazole    Tablet 40 milliGRAM(s) Oral before breakfast      PHYSICAL EXAM:  T(C): 36.6 (05-31-23 @ 08:00), Max: 37.1 (05-31-23 @ 00:18)  HR: 85 (05-31-23 @ 08:00) (67 - 96)  BP: 112/56 (05-31-23 @ 08:00) (102/66 - 129/77)  RR: 20 (05-31-23 @ 08:00) (18 - 20)  SpO2: 97% (05-31-23 @ 08:00) (96% - 98%)  Wt(kg): --  I&O's Summary          Appearance: NAD  HEENT:   Normal oral mucosa, PERRL, EOMI	  Lymphatic: No lymphadenopathy  Cardiovascular: Normal S1 S2, No JVD, No murmurs, No edema  Respiratory: Lungs clear to auscultation	  Psychiatry: A & O x 3, Mood & affect appropriate  Gastrointestinal:  Soft, Non-tender, + BS	  Skin: No rashes, No ecchymoses, No cyanosis	  Extremities: Normal range of motion, No clubbing, cyanosis or edema  Vascular: Peripheral pulses palpable 2+ bilaterally  NEUROLOGICAL EXAM:  Mental status: Awake, Alert, Oriented to person, place, and time. Speech fluent, repetition and naming intact. Follows simple commands.  Cranial Nerves: - PERRLA, VFF, EOMI, face sensation (V1-V3) intact b/l, facial strength intact without asymmetry b/l, hearing intact b/l, palate with symmetric elevation, trapezius 5/5 strength b/l, tongue midline on protrusion with full lateral movement  Motor exam: Normal tone, no drift,  RUE 5/5,RLE 5/5, LUE 5/5, LLE 5/5.  Sensation: decreased sensation to light touch on RLE  Coordination: Finger to Nose intact b/l. No tremors appreciated  Gait and station: Normal casual gait.        LABS:    CARDIAC MARKERS:                  proBNP:   Lipid Profile:   HgA1c:   TSH:             TELEMETRY: 	    ECG:  	  RADIOLOGY:   DIAGNOSTIC TESTING:  [ ] Echocardiogram:  [ ]  Catheterization:  [ ] Stress Test:    OTHER:

## 2023-05-31 NOTE — PROGRESS NOTE ADULT - PROBLEM SELECTOR PLAN 2
s/p radiation in 2006   given that, theres higher risk of Afib as well as ostial coronary stenosis   outpt ischemic eval as well. negative

## 2023-05-31 NOTE — PROGRESS NOTE ADULT - NS ATTEND AMEND GEN_ALL_CORE FT
s/p [employer] ILR today. See full OP note in results section of [facility]. No further EP workup as an inpatient.    MOHINDER Griffith
Thirty four minutes of discharge time was spent on patient exam and discussion including test results, pathophysiology, natural history, stroke risk factors, medication changes and secondary prophylaxis and importance of medication compliance. We also discussed follow up plan. This was discussed with patient/family, who expresses understanding.
Thirty three minutes of discharge time was spent on patient exam and discussion including test results, pathophysiology, natural history, stroke risk factors, medication changes and secondary prophylaxis and importance of medication compliance. We also discussed follow up plan. This was discussed with patient/family, who expresses understanding. s/p loop placement.

## 2023-05-31 NOTE — PROGRESS NOTE ADULT - REASON FOR ADMISSION
left leg weakness

## 2023-05-31 NOTE — PROGRESS NOTE ADULT - SUBJECTIVE AND OBJECTIVE BOX
24H hour events: Pt without complaint, no acute events overnight, Tele: SR in the 60-90's. No afib noted.      MEDICATIONS:  aspirin  chewable 81 milliGRAM(s) Oral daily  loratadine 10 milliGRAM(s) Oral daily  montelukast 10 milliGRAM(s) Oral daily  acetaminophen     Tablet .. 650 milliGRAM(s) Oral every 6 hours PRN  ARIPiprazole 5 milliGRAM(s) Oral daily  buPROPion XL (24-Hour) . 150 milliGRAM(s) Oral daily  gabapentin 300 milliGRAM(s) Oral two times a day  pantoprazole    Tablet 40 milliGRAM(s) Oral before breakfast  atorvastatin 80 milliGRAM(s) Oral at bedtime  levothyroxine 100 MICROGram(s) Oral daily  multivitamin 1 Tablet(s) Oral daily      REVIEW OF SYSTEMS:  Complete 12 point ROS negative.    PHYSICAL EXAM:  T(C): 36.6 (05-31-23 @ 08:00), Max: 37.1 (05-31-23 @ 00:18)  HR: 85 (05-31-23 @ 08:00) (67 - 96)  BP: 112/56 (05-31-23 @ 08:00) (102/66 - 129/77)  RR: 20 (05-31-23 @ 08:00) (17 - 20)  SpO2: 97% (05-31-23 @ 08:00) (95% - 98%)  Wt(kg): --  I&O's Summary      Appearance: Normal	  Cardiovascular: Normal S1 S2, No JVD, No murmurs  Respiratory: Lungs clear to auscultation	  Psychiatry: A & O x 3, Mood & affect appropriate  Gastrointestinal: Soft, Non-tender, + BS	  Skin: No rashes, No ecchymoses, No cyanosis	  Neurologic: Grossly intact  Extremities: No clubbing, cyanosis or edema  Vascular: Peripheral pulses palpable 2+ bilaterally      NO LABS:	 	        TELEMETRY: SR in the 60-90's. No afib noted.  	      < from: TTE W or WO Ultrasound Enhancing Agent (05.30.23 @ 14:10) >  CONCLUSIONS:      1. Compared to the transthoracic echocardiogram performed on 5/30/2023 there have been no significant interval changes.    ________________________________________________________________________________________  FINDINGS:     Left Ventricle:  The left ventricular systolic function is normal with an ejection fraction visually estimated at 60 to 65%.     Interatrial Septum:  Agitated saline injection was negative for intracardiac shunt.  ____________________________________________________________________  QUANTITATIVE DATA  Left Ventricle Measurements     Visualized LV EF%: 60 to 65%       LVOT / RVOT/ Qp/Qs Data:  ________________________________________________________________________________________  Diagnosing Physician: Frank Sanz M.D.  Electronically signed on 5/30/2023 at 2:53:39 PM Dada Sanz M.D.    < end of copied text >    < from: TTE W or WO Ultrasound Enhancing Agent (05.30.23 @ 11:48) >  CONCLUSIONS:      1. Small left ventricular cavity size. The left ventricular wall thickness is normal. The left ventricular systolic function is normal with an ejection fraction of 68 % by Nelson's method of disks. There are no regional wall motion abnormalities seen.   2. There is mild (grade 1) left ventricular diastolic dysfunction.   3. Normal atria.   4. Normal right ventricular cavity size, normal wall thickness and normal systolic function. The tricuspid annular plane systolic excursion (TAPSE) is 2.0 cm (normal >=1.7 cm).   5. No significant valvular disease.   6. No pericardial effusion seen.   7. No prior echocardiogram is available for comparison.    ________________________________________________________________________________________  FINDINGS:     Left Ventricle:  Small left ventricular cavity size. The left ventricular wall thickness is normal. The left ventricular systolic function is normal with a calculated ejection fraction of 68 % by the Nelson's biplane method of disks. There are no regional wall motion abnormalities seen. There is mild (grade 1) left ventricular diastolic dysfunction, with normal filling pressure.     Right Ventricle:  Normal right ventricularcavity size, normal wall thickness and normal systolic function. Tricuspid annular plane systolic excursion (TAPSE) is 2.0 cm (normal >=1.7 cm).     Left Atrium:  The left atrium is normal.     Right Atrium:  The right atrium is normal.     Interatrial Septum:  The interatrial septum appears intact.     Aortic Valve:  The aortic valve is tricuspid with normal structure without stenosis. There is no evidence of aortic regurgitation.     Mitral Valve:  Structurally normal mitral valve with normal leaflet excursion. There is trace mitral regurgitation.     Tricuspid Valve:  Structurally normal tricuspid valve with normal leaflet excursion. There is trace tricuspid regurgitation.     Pulmonic Valve:  Structurally normal pulmonic valve with normalleaflet excursion. There is trace pulmonic regurgitation.     Aorta:  The aortic annulus and aortic root appear normal in size.     Pericardium:  No pericardial effusion seen.  ____________________________________________________________________  QUANTITATIVE DATA  Left Ventricle Measurements                         Indexed BSA  IVSd (2D):   1.1 cm  LVPWd (2D):  1.1 cm  LVIDd (2D):  3.8 cm  LVIDs (2D):  2.7 cm  LV Mass:     138 g   77.3 g/m²  BiPlane LV EF%: 68 %     MV E Vmax:    0.48 m/s  MV A Vmax:    0.98 m/s  MV E/A:       0.48  e' lateral:   5.55 cm/s  e' medial:    7.40 cm/s  E/e' lateral: 8.59  E/e' medial:  6.45  E/e' Average: 7.37    Aorta Measurements     Ao Sinus: 2.9 cm       Left Atrium Measurements     LA Diam 2D: 4.10 cm    Right Ventricle Measurements     TAPSE:           2.0 cm  TV [place]. S':      12.30 cm/s  RV Base (RVID1): 3.3 cm  RV Mid (RVID2):  2.8 cm       LVOT / RVOT/ Qp/Qs Data:  LVOT Diameter:   2.10 cm  LVOT Vmax:       0.79 m/s  LVOT VTI:        13.44 cm  LVOT SV:         46.5 ml  LVOT SV Indexed: 26.16 ml/m²    Mitral Valve Measurements     MV E Vmax: 0.5 m/s  MV A Vmax: 1.0 m/s  MV E/A:    0.5    ________________________________________________________________________________________  Diagnosing Physician: Frank Sanz M.D.  Electronically signed on 5/30/2023 at 1:34:50 PM by Frank Sanz M.D.    < end of copied text >
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
## 2023-05-31 NOTE — PROGRESS NOTE ADULT - ASSESSMENT
72 year old woman with a PMHx significant for bilateral breast cancer s/p radiation and b/l breast implant, laryngeal cancer s/p laryngectomy, and hypothyroidism sent in by PMD after outpatient MRI revealed "punctate focus of restricted diffusion in the left parietal lobe c/w acute infarct". EP consulted for implantable loop recorder (ILR) to monitor for atrial fibrillation.     Cryptogenic stroke  -Tele and ECG showed SR, no Afib  -Unremarkable TTE w/ bubble study  -ILR is appropriate for this patient  -Lovenox held this morning   -Plan for ILR today in EP lab under sono given that the patient has breast implants.  -Post ILR teaching enforced with the patient  -Pt to follow up in EP Device clinic for wound check as scheduled on 6/16/23 at 9:40am. Future ILR f/u via remote with Cardiology.   -Plan discussed with Neurology    CELINA De La Cruz NP-C  Can reach me via Teams

## 2023-05-31 NOTE — PROGRESS NOTE ADULT - PROBLEM SELECTOR PLAN 1
ASA and statin  No events on tele thus far   Stroke team recommending ILR t o rule out occult PAF. She has received chest radiation in the past which would increase risk of future afib risk

## 2023-06-16 ENCOUNTER — APPOINTMENT (OUTPATIENT)
Dept: ELECTROPHYSIOLOGY | Facility: CLINIC | Age: 73
End: 2023-06-16

## 2023-07-17 NOTE — DISCHARGE NOTE PROVIDER - NSDCQMANTITHROM_GEN_A_CORE
----- Message from Yari Heredia sent at 7/17/2023  8:40 AM CDT -----  Contact: walk in - Santiago Escobedo - spouse   Mr. Escobedo, spouse, dropped off a [facility] - Examination for housebound status or permanent need for regular aid and attendance - form to be completed.  Pls call when ready for .  In box.      
Paperwork in provider's box for review.   
Yes

## 2023-08-07 ENCOUNTER — OUTPATIENT (OUTPATIENT)
Dept: OUTPATIENT SERVICES | Facility: HOSPITAL | Age: 73
LOS: 1 days | End: 2023-08-07
Payer: MEDICARE

## 2023-08-07 VITALS
OXYGEN SATURATION: 96 % | RESPIRATION RATE: 16 BRPM | TEMPERATURE: 99 F | WEIGHT: 177.03 LBS | DIASTOLIC BLOOD PRESSURE: 77 MMHG | HEART RATE: 81 BPM | SYSTOLIC BLOOD PRESSURE: 133 MMHG

## 2023-08-07 DIAGNOSIS — Z01.818 ENCOUNTER FOR OTHER PREPROCEDURAL EXAMINATION: ICD-10-CM

## 2023-08-07 DIAGNOSIS — Z98.890 OTHER SPECIFIED POSTPROCEDURAL STATES: Chronic | ICD-10-CM

## 2023-08-07 DIAGNOSIS — Z90.13 ACQUIRED ABSENCE OF BILATERAL BREASTS AND NIPPLES: Chronic | ICD-10-CM

## 2023-08-07 DIAGNOSIS — Z98.89 OTHER SPECIFIED POSTPROCEDURAL STATES: Chronic | ICD-10-CM

## 2023-08-07 DIAGNOSIS — Z90.02 ACQUIRED ABSENCE OF LARYNX: Chronic | ICD-10-CM

## 2023-08-07 DIAGNOSIS — H25.11 AGE-RELATED NUCLEAR CATARACT, RIGHT EYE: ICD-10-CM

## 2023-08-07 DIAGNOSIS — I63.9 CEREBRAL INFARCTION, UNSPECIFIED: ICD-10-CM

## 2023-08-07 DIAGNOSIS — C32.9 MALIGNANT NEOPLASM OF LARYNX, UNSPECIFIED: ICD-10-CM

## 2023-08-07 LAB
HCT VFR BLD CALC: 39.3 % — SIGNIFICANT CHANGE UP (ref 34.5–45)
HGB BLD-MCNC: 13.1 G/DL — SIGNIFICANT CHANGE UP (ref 11.5–15.5)
MCHC RBC-ENTMCNC: 32 PG — SIGNIFICANT CHANGE UP (ref 27–34)
MCHC RBC-ENTMCNC: 33.3 GM/DL — SIGNIFICANT CHANGE UP (ref 32–36)
MCV RBC AUTO: 96.1 FL — SIGNIFICANT CHANGE UP (ref 80–100)
NRBC # BLD: 0 /100 WBCS — SIGNIFICANT CHANGE UP (ref 0–0)
PLATELET # BLD AUTO: 264 K/UL — SIGNIFICANT CHANGE UP (ref 150–400)
RBC # BLD: 4.09 M/UL — SIGNIFICANT CHANGE UP (ref 3.8–5.2)
RBC # FLD: 12.7 % — SIGNIFICANT CHANGE UP (ref 10.3–14.5)
WBC # BLD: 5.31 K/UL — SIGNIFICANT CHANGE UP (ref 3.8–10.5)
WBC # FLD AUTO: 5.31 K/UL — SIGNIFICANT CHANGE UP (ref 3.8–10.5)

## 2023-08-07 PROCEDURE — 85027 COMPLETE CBC AUTOMATED: CPT

## 2023-08-07 PROCEDURE — G0463: CPT

## 2023-08-07 PROCEDURE — 36415 COLL VENOUS BLD VENIPUNCTURE: CPT

## 2023-08-07 NOTE — H&P PST ADULT - PROBLEM SELECTOR PLAN 4
Instructed to take Neurontin AM of surgery with a sip of water. Medical and cardiac clearances needed and received in chart.    CBC ordered. CMP from 7/21/23 and EKG from 5/26/23 in chart.   Pre-op instructions given to patient and pt verbalized understanding.

## 2023-08-07 NOTE — H&P PST ADULT - LYMPHATIC
No palpable cervical and supraclavicular lymphadenopathy posterior cervical L/posterior cervical R/anterior cervical L/anterior cervical R/supraclavicular L/supraclavicular R

## 2023-08-07 NOTE — H&P PST ADULT - HISTORY OF PRESENT ILLNESS
67 yo female with hx of Anxiety and depression, hypothyroidism, osteoporosis , shmuel-en-y gastric bypass, breast ca s/p bilateral mastectomy, asthma presents to have PST evaluation for microlaryngoscopy with resection of laryngeal on 6/8/2018. Patient reports, hx of laryngeal CA (2015), treated with surgical resection and radiation, has been c/o hoarseness, coughing, went for an evaluation, "lesion was found in throat", had resection & biopsy done by Dr. White in 4/2018 - benign. Patient states, went for f/u evaluation, found another lesion, surgical intervention was recommended. Denies SOB, dysphagia, but c/o voice hoarseness and coughing with phlegm.     Pt complaining of having blurriness and haziness of right eye for the last 6 months. Pt denies right eye pain, eye discharge and infection. Pt electing for  73 y/o female with PMH of laryngeal ca, s/p total laryngectomy on 2019 with trach, anxiety and depression, hypothyroidism, osteoporosis , shmuel-en-y gastric bypass, breast ca s/p bilateral mastectomy and asthma presents to have PST. Pt complaining of having blurriness and haziness of right eye for the last 6 months and diagnosed with right eye cataract. Pt denies right eye pain, eye discharge and infection. Pt electing for cataract extraction with IOL right eye on 8/16/2023.  71 y/o female with PMH of laryngeal ca, s/p total laryngectomy in 2019 with trach, anxiety and depression, hypothyroidism, osteoporosis , shmuel-en-y gastric bypass, breast ca s/p bilateral mastectomy and asthma presents to have PST. Pt complaining of having blurriness and haziness of right eye for the last 6 months and diagnosed with right eye cataract. Pt denies right eye pain, eye discharge and infection. Pt electing for cataract extraction with IOL right eye on 8/16/2023.

## 2023-08-07 NOTE — H&P PST ADULT - OTHER CARE PROVIDERS
Dr. Kan (Cardiologist), Dr. Cazares (Neurologist), Dr. Christel Quezada (pulmonologist)  - [phone] / Dr. Cole Sky  (oncologist) [phone] / Dr. Peters (rheumatologist)

## 2023-08-07 NOTE — H&P PST ADULT - PROBLEM SELECTOR PLAN 3
Instructed to take levothyroxine AM of surgery with a sip of water. Last note from Head and neck surgeon in chart. Clearances reviewed by anesthesia. Instructed pt to call back with trach size and whether it's cuffed ot uncuffed. Pt verbalized understanding.

## 2023-08-07 NOTE — H&P PST ADULT - NEGATIVE ENMT SYMPTOMS
no hearing difficulty/no ear pain/no tinnitus/no vertigo/no sinus symptoms/no nasal congestion/no nasal discharge/no nasal obstruction/no nose bleeds/no recurrent cold sores/no abnormal taste sensation/no gum bleeding/no throat pain/no dysphagia no throat pain

## 2023-08-07 NOTE — H&P PST ADULT - NSICDXPASTSURGICALHX_GEN_ALL_CORE_FT
PAST SURGICAL HISTORY:  Anal Abscess      Section X 2    Cosmetic Surgery liposuction abdomen 11/24/10    H/O breast surgery Reconstructive surgery - Right & Left    History of laryngoscopy excision of lesion & biopsy 2018    History of loop recorder     S/P carpal tunnel release Bilateral    S/P Cholecystectomy     S/P excision of vocal cord nodule 2015    S/P Gastric Bypass  Dr. Gibson    S/P laryngectomy     S/P Lumpectomy of Breast left     S/P mastectomy, bilateral 2010

## 2023-08-07 NOTE — H&P PST ADULT - NSICDXPASTMEDICALHX_GEN_ALL_CORE_FT
PAST MEDICAL HISTORY:  Anxiety     Arthritis     Asthma no h/o recent exacerbation    Breast Cancer s/p bilateral mastectomy    CVA (cerebrovascular accident)     Depression     Diverticulitis history treated with ABT    Environmental Allergies dust, mold, mildew, cat dander,    Fibromyalgia     GERD (gastroesophageal reflux disease)     Herniated Disc     Hypothyroid     Laryngeal cancer October 2015 & had Radiation    Neuropathy lumbar herniated disc affecting feet    Osteoarthritis critera    Osteoporosis      PAST MEDICAL HISTORY:  Age-related nuclear cataract, right eye     Anxiety     Arthritis     Asthma no h/o recent exacerbation    Breast Cancer s/p bilateral mastectomy    CVA (cerebrovascular accident)     Depression     Diverticulitis history treated with ABT    Environmental Allergies dust, mold, mildew, cat dander,    Fibromyalgia     GERD (gastroesophageal reflux disease)     Herniated Disc     Hypothyroid     Laryngeal cancer October 2015 & had Radiation    Neuropathy lumbar herniated disc affecting feet    Osteoarthritis critera    Osteoporosis

## 2023-08-07 NOTE — H&P PST ADULT - ASSESSMENT
66 yo female with preop dx of other diseases of larynx 73 y/o female with age related nuclear cataract right eye.

## 2023-08-07 NOTE — H&P PST ADULT - MUSCULOSKELETAL
no calf tenderness details… normal/ROM intact/no calf tenderness/normal gait/strength 5/5 bilateral upper extremities/strength 5/5 bilateral lower extremities negative

## 2023-08-07 NOTE — H&P PST ADULT - NEGATIVE OPHTHALMOLOGIC SYMPTOMS
no diplopia/no photophobia/no blurred vision L/no blurred vision R no lacrimation R/no discharge R/no pain R/no irritation R

## 2023-08-07 NOTE — H&P PST ADULT - PROBLEM SELECTOR PLAN 2
Instructed to take omeprazole AM of surgery with a sip of water. Cataract extraction with IOL right eye on 8/16/2023.

## 2023-08-07 NOTE — H&P PST ADULT - PROBLEM SELECTOR PLAN 1
Scheduled for microlaryngoscopy with resection of laryngeal on 6/8/2018. labs done and results pending. Preop instruction given and explained. Verbalized understanding.   Patient states, she's seen by PCP for medical evaluation. Will obtain. Neurology clearance in chart. Neurology clearance in chart. Instructed pt to call neurologist to ask if she should continue taking baby aspirin until morning of surgery. Pt verbalized understanding.

## 2023-09-05 PROBLEM — H25.11 AGE-RELATED NUCLEAR CATARACT, RIGHT EYE: Chronic | Status: ACTIVE | Noted: 2023-08-07

## 2023-09-05 PROBLEM — I63.9 CEREBRAL INFARCTION, UNSPECIFIED: Chronic | Status: ACTIVE | Noted: 2023-08-07

## 2023-09-12 NOTE — ASU PATIENT PROFILE, ADULT - SITE
Trazadone was sent 1 week ago at an increased dosage. Is this working? And does she want a long term supply sent to the pharmacy at the increased dosage? Please correct trazadone if so. throat

## 2023-09-26 ENCOUNTER — NON-APPOINTMENT (OUTPATIENT)
Age: 73
End: 2023-09-26

## 2023-09-26 ENCOUNTER — APPOINTMENT (OUTPATIENT)
Dept: OPHTHALMOLOGY | Facility: CLINIC | Age: 73
End: 2023-09-26
Payer: MEDICARE

## 2023-09-26 PROCEDURE — 92136 OPHTHALMIC BIOMETRY: CPT

## 2023-09-26 PROCEDURE — 92004 COMPRE OPH EXAM NEW PT 1/>: CPT

## 2023-10-10 ENCOUNTER — NON-APPOINTMENT (OUTPATIENT)
Age: 73
End: 2023-10-10

## 2023-10-10 ENCOUNTER — APPOINTMENT (OUTPATIENT)
Dept: OPHTHALMOLOGY | Facility: CLINIC | Age: 73
End: 2023-10-10
Payer: MEDICARE

## 2023-10-10 PROCEDURE — 92012 INTRM OPH EXAM EST PATIENT: CPT

## 2023-10-23 ENCOUNTER — OUTPATIENT (OUTPATIENT)
Dept: OUTPATIENT SERVICES | Facility: HOSPITAL | Age: 73
LOS: 1 days | End: 2023-10-23

## 2023-10-23 VITALS
TEMPERATURE: 98 F | SYSTOLIC BLOOD PRESSURE: 146 MMHG | OXYGEN SATURATION: 97 % | HEART RATE: 79 BPM | WEIGHT: 167.99 LBS | DIASTOLIC BLOOD PRESSURE: 79 MMHG | RESPIRATION RATE: 16 BRPM | HEIGHT: 63 IN

## 2023-10-23 DIAGNOSIS — H25.811 COMBINED FORMS OF AGE-RELATED CATARACT, RIGHT EYE: ICD-10-CM

## 2023-10-23 DIAGNOSIS — Z98.890 OTHER SPECIFIED POSTPROCEDURAL STATES: Chronic | ICD-10-CM

## 2023-10-23 DIAGNOSIS — Z90.13 ACQUIRED ABSENCE OF BILATERAL BREASTS AND NIPPLES: Chronic | ICD-10-CM

## 2023-10-23 DIAGNOSIS — Z86.69 PERSONAL HISTORY OF OTHER DISEASES OF THE NERVOUS SYSTEM AND SENSE ORGANS: ICD-10-CM

## 2023-10-23 DIAGNOSIS — Z98.89 OTHER SPECIFIED POSTPROCEDURAL STATES: Chronic | ICD-10-CM

## 2023-10-23 DIAGNOSIS — I63.9 CEREBRAL INFARCTION, UNSPECIFIED: ICD-10-CM

## 2023-10-23 DIAGNOSIS — Z90.02 ACQUIRED ABSENCE OF LARYNX: Chronic | ICD-10-CM

## 2023-10-23 DIAGNOSIS — Z91.89 OTHER SPECIFIED PERSONAL RISK FACTORS, NOT ELSEWHERE CLASSIFIED: ICD-10-CM

## 2023-10-23 DIAGNOSIS — I10 ESSENTIAL (PRIMARY) HYPERTENSION: ICD-10-CM

## 2023-10-23 LAB
ANION GAP SERPL CALC-SCNC: 9 MMOL/L — SIGNIFICANT CHANGE UP (ref 7–14)
ANION GAP SERPL CALC-SCNC: 9 MMOL/L — SIGNIFICANT CHANGE UP (ref 7–14)
BUN SERPL-MCNC: 10 MG/DL — SIGNIFICANT CHANGE UP (ref 7–23)
BUN SERPL-MCNC: 10 MG/DL — SIGNIFICANT CHANGE UP (ref 7–23)
CALCIUM SERPL-MCNC: 9.5 MG/DL — SIGNIFICANT CHANGE UP (ref 8.4–10.5)
CALCIUM SERPL-MCNC: 9.5 MG/DL — SIGNIFICANT CHANGE UP (ref 8.4–10.5)
CHLORIDE SERPL-SCNC: 87 MMOL/L — LOW (ref 98–107)
CHLORIDE SERPL-SCNC: 87 MMOL/L — LOW (ref 98–107)
CO2 SERPL-SCNC: 29 MMOL/L — SIGNIFICANT CHANGE UP (ref 22–31)
CO2 SERPL-SCNC: 29 MMOL/L — SIGNIFICANT CHANGE UP (ref 22–31)
CREAT SERPL-MCNC: 0.59 MG/DL — SIGNIFICANT CHANGE UP (ref 0.5–1.3)
CREAT SERPL-MCNC: 0.59 MG/DL — SIGNIFICANT CHANGE UP (ref 0.5–1.3)
EGFR: 97 ML/MIN/1.73M2 — SIGNIFICANT CHANGE UP
EGFR: 97 ML/MIN/1.73M2 — SIGNIFICANT CHANGE UP
GLUCOSE SERPL-MCNC: 102 MG/DL — HIGH (ref 70–99)
GLUCOSE SERPL-MCNC: 102 MG/DL — HIGH (ref 70–99)
HCT VFR BLD CALC: 35.7 % — SIGNIFICANT CHANGE UP (ref 34.5–45)
HCT VFR BLD CALC: 35.7 % — SIGNIFICANT CHANGE UP (ref 34.5–45)
HGB BLD-MCNC: 12.7 G/DL — SIGNIFICANT CHANGE UP (ref 11.5–15.5)
HGB BLD-MCNC: 12.7 G/DL — SIGNIFICANT CHANGE UP (ref 11.5–15.5)
MCHC RBC-ENTMCNC: 32 PG — SIGNIFICANT CHANGE UP (ref 27–34)
MCHC RBC-ENTMCNC: 32 PG — SIGNIFICANT CHANGE UP (ref 27–34)
MCHC RBC-ENTMCNC: 35.6 GM/DL — SIGNIFICANT CHANGE UP (ref 32–36)
MCHC RBC-ENTMCNC: 35.6 GM/DL — SIGNIFICANT CHANGE UP (ref 32–36)
MCV RBC AUTO: 89.9 FL — SIGNIFICANT CHANGE UP (ref 80–100)
MCV RBC AUTO: 89.9 FL — SIGNIFICANT CHANGE UP (ref 80–100)
NRBC # BLD: 0 /100 WBCS — SIGNIFICANT CHANGE UP (ref 0–0)
NRBC # BLD: 0 /100 WBCS — SIGNIFICANT CHANGE UP (ref 0–0)
NRBC # FLD: 0 K/UL — SIGNIFICANT CHANGE UP (ref 0–0)
NRBC # FLD: 0 K/UL — SIGNIFICANT CHANGE UP (ref 0–0)
PLATELET # BLD AUTO: 255 K/UL — SIGNIFICANT CHANGE UP (ref 150–400)
PLATELET # BLD AUTO: 255 K/UL — SIGNIFICANT CHANGE UP (ref 150–400)
POTASSIUM SERPL-MCNC: 4.5 MMOL/L — SIGNIFICANT CHANGE UP (ref 3.5–5.3)
POTASSIUM SERPL-MCNC: 4.5 MMOL/L — SIGNIFICANT CHANGE UP (ref 3.5–5.3)
POTASSIUM SERPL-SCNC: 4.5 MMOL/L — SIGNIFICANT CHANGE UP (ref 3.5–5.3)
POTASSIUM SERPL-SCNC: 4.5 MMOL/L — SIGNIFICANT CHANGE UP (ref 3.5–5.3)
RBC # BLD: 3.97 M/UL — SIGNIFICANT CHANGE UP (ref 3.8–5.2)
RBC # BLD: 3.97 M/UL — SIGNIFICANT CHANGE UP (ref 3.8–5.2)
RBC # FLD: 12.3 % — SIGNIFICANT CHANGE UP (ref 10.3–14.5)
RBC # FLD: 12.3 % — SIGNIFICANT CHANGE UP (ref 10.3–14.5)
SODIUM SERPL-SCNC: 125 MMOL/L — LOW (ref 135–145)
SODIUM SERPL-SCNC: 125 MMOL/L — LOW (ref 135–145)
WBC # BLD: 7.15 K/UL — SIGNIFICANT CHANGE UP (ref 3.8–10.5)
WBC # BLD: 7.15 K/UL — SIGNIFICANT CHANGE UP (ref 3.8–10.5)
WBC # FLD AUTO: 7.15 K/UL — SIGNIFICANT CHANGE UP (ref 3.8–10.5)
WBC # FLD AUTO: 7.15 K/UL — SIGNIFICANT CHANGE UP (ref 3.8–10.5)

## 2023-10-23 RX ORDER — SODIUM FLUORIDE 1.1 G/100G
1 GEL ORAL
Refills: 0 | DISCHARGE

## 2023-10-23 RX ORDER — SODIUM CHLORIDE 9 MG/ML
1000 INJECTION, SOLUTION INTRAVENOUS
Refills: 0 | Status: DISCONTINUED | OUTPATIENT
Start: 2024-03-06 | End: 2024-03-06

## 2023-10-23 RX ORDER — LORATADINE 10 MG/1
1 TABLET ORAL
Refills: 0 | DISCHARGE

## 2023-10-23 RX ORDER — CLONAZEPAM 1 MG
1 TABLET ORAL
Qty: 0 | Refills: 0 | DISCHARGE

## 2023-10-23 NOTE — H&P PST ADULT - PROBLEM SELECTOR PLAN 4
Medical and cardiac clearances needed and received in chart.    CBC ordered. CMP from 7/21/23 and EKG from 5/26/23 in chart.   Pre-op instructions given to patient and pt verbalized understanding. Intermediate risk for AGUSTÍN identified by screening tool.   Airway precautions recommended.  Pt with h/o Laryngectomy and speaks with  Pt seen and evaluated by Dr. Leach today 10/23/23 at [facility].

## 2023-10-23 NOTE — H&P PST ADULT - ALLERGY TYPES
rolling walker outdoor environmental allergies/indoor environmental allergies/reactions to medicines

## 2023-10-23 NOTE — H&P PST ADULT - HISTORY OF PRESENT ILLNESS
71 y/o female with h/o right cataract presents for preop eval to have catarct extraction with intraocular lens implant on 10/25/23.    Pt with PMH of laryngeal ca, s/p total laryngectomy in 2019 with trach, anxiety and depression, hypothyroidism, osteoporosis , shmuel-en-y gastric bypass, breast ca s/p bilateral mastectomy and asthma presents to have PST. Pt complaining of having blurriness and haziness of right eye for the last 6 months and diagnosed with right eye cataract. Pt denies right eye pain, eye discharge and infection. Pt was scheduled for cataract extraction with IOL right eye on 8/16/2023 in [facility] but cancelled due to her h/o laryngectomy as per the patient

## 2023-10-23 NOTE — H&P PST ADULT - PROBLEM SELECTOR PLAN 1
Neurology clearance in chart. Instructed pt to call neurologist to ask if she should continue taking baby aspirin until morning of surgery. Pt verbalized understanding. 71 y/o female with h/o right cataract presents for preop eval to have cataract extraction with intraocular lens implant on 10/25/23.  labs pending, ekg done in may 2023 in chart.  Preop instructions provided to pt.  Famotidine  provided to pt with instructions.  Pt going for medical clearance today 10/23/23 will obtain copy.

## 2023-10-23 NOTE — H&P PST ADULT - OTHER CARE PROVIDERS
Dr. Kan (Cardiologist), Dr. Cazares (Neurologist), Dr. Christel Quezada (pulmonologist)  - [phone] / Dr. Cole Sky  (oncologist) [phone] / Dr. Peters (rheumatologist)

## 2023-10-23 NOTE — H&P PST ADULT - NSANTHOSAYNRD_GEN_A_CORE
never tested/No. AGUSTÍN screening performed.  STOP BANG Legend: 0-2 = LOW Risk; 3-4 = INTERMEDIATE Risk; 5-8 = HIGH Risk

## 2023-10-23 NOTE — H&P PST ADULT - NSICDXPASTMEDICALHX_GEN_ALL_CORE_FT
PAST MEDICAL HISTORY:  Age-related nuclear cataract, right eye     Anxiety     Arthritis     Asthma no h/o recent exacerbation    Breast Cancer s/p bilateral mastectomy    CVA (cerebrovascular accident)     Depression     Diverticulitis history treated with ABT    Environmental Allergies dust, mold, mildew, cat dander,    Fibromyalgia     GERD (gastroesophageal reflux disease)     Herniated Disc     Hypothyroid     Laryngeal cancer October 2015 & had Radiation    Neuropathy lumbar herniated disc affecting feet    Osteoarthritis critera    Osteoporosis

## 2023-10-23 NOTE — H&P PST ADULT - NS MD HP INPLANTS MED DEV
PA Student Note     Primary Care : Briana Elizabeth MD    CHIEF COMPLAINT: Syncope and collapse    HISTORY OF PRESENT ILLNESS:    Mr. Warner is a 74 y.o. male with a past medical history of CKD, Sjogren's syndrome, BPH, GERD, DM type II, HTN, RA, sick sinus syndrome with pacemaker, and hypothyroidism s/p thyroidectomy. He presented to the ER on 2/2/20 with a chief complaint of syncope and collapse which occurred at home while walking into the bathroom. Collapse occurred again while walking into the ED. He does not recall the episodes. Regarding the first episode, he states that he only remembers getting up to go to the bathroom and then being awakened by his wife. He denied any preceding feelings of fever, chills, cough, or chest pain in the ER on 2/2.   An episode similar to this occurred in October 2019. After 3 days of inpatient work-up, no cause was identified. The patient's history of SSS was not attributed to the October episode. CT did not reveal a potential cause of the episode.     In the ER on 2/2, laceration repair was completed on his right ear. He was also found to have multiple contusions and abrasion of his right forehead.Troponin was 0.048, which was relatively unchanged from June 2019. Creatinine was elevated to 2.64 with an eGFR of 24. Intermittent A-fib and a ventricular paced rhythm were found on ECG, which were unchanged from previous interrogations. Head CT and cervical spine CT with contrast showed no acute pathology. Imaging of bilateral hips, right knee, right shoulder, and bilateral wrists showed no acute abnormalities. He was found to be hypochloremic and hypokalemic.      2/3: Today, he is stable without new episodes of syncope. Cardiology consult was completed on 2/2, with recommendations to check ESR and CRP to rule out temporal arteritis. Cardiology also agreed with obtaining orthostatic vital signs and to avoid vasodilators (Hydralazine discontinued for hospital course). Today, his  ESR is mildly elevated at 46 and CRP is 2.64. His imaging revealed no concerning findings as the cause of his syncope on CT or MRI. He is denying his scheduled diabetes injections aside from his sliding scale, stating that his current regimen is Lantus 49 units in the AM and PM along with his sliding scale.     2/4: Today, he is stable without new episodes of syncope. After initial pacemaker interrogation, no evidence of pacemaker dysfunction was found. However, the patient is having increasingly frequent episodes of atrial fibrillation as well as an elevated paced AV heart rate at 120. Cardiology recommended starting beta-blockers and continuing anticoagulation as well as discussion with electrophysiology. Venous dopper of lower extremities and carotid duplex showed no abnormalities. Nephrology following as well and recommending hold of diuretics and potassium repletion.     Past Medical History:   Diagnosis Date   • Chronic kidney disease    • Diabetes mellitus (CMS/HCC)    • Sjogren's syndrome (CMS/HCC)        Past Surgical History:   Procedure Laterality Date   • BACK SURGERY     • CATARACT EXTRACTION, BILATERAL     • HIP SURGERY  04/2012    total hip arthroplasty Rt hip   • LAPAROSCOPIC CHOLECYSTECTOMY     • NECK SURGERY     • TOTAL THYROIDECTOMY         Family History   Problem Relation Age of Onset   • Hypertension Mother    • Heart disease Mother    • Alzheimer's disease Father    • Heart disease Brother    • Hypertension Daughter    • Diabetes Daughter    • Heart disease Brother    • Arthritis Brother    • No Known Problems Brother    • No Known Problems Brother        Social History     Tobacco Use   • Smoking status: Former Smoker     Types: Cigars   • Smokeless tobacco: Former User     Types: Chew   Substance Use Topics   • Alcohol use: No     Frequency: Never   • Drug use: No       Medications Prior to Admission   Medication Sig Dispense Refill Last Dose   • albuterol sulfate HFA (PROAIR HFA) 108 (90  Base) MCG/ACT inhaler Inhale 2 puffs Every 4 (Four) Hours As Needed for Wheezing or Shortness of Air.   Taking   • apixaban (ELIQUIS) 5 MG tablet tablet Take 5 mg by mouth Every 12 (Twelve) Hours.      • calcitriol (ROCALTROL) 0.25 MCG capsule Take 0.25 mcg by mouth Daily.   Taking   • Calcium Carbonate-Vit D-Min (CALCIUM 1200) 4400-2816 MG-UNIT chewable tablet Chew 1 tablet Daily.   Taking   • diazePAM (VALIUM) 5 MG tablet Take 5 mg by mouth Every 8 (Eight) Hours As Needed for Anxiety.   Taking   • docusate sodium ([employer] STOOL SOFTENER) 100 MG capsule Take 100 mg by mouth 2 (Two) Times a Day As Needed for Constipation.   Taking   • FLUoxetine (PROzac) 10 MG capsule Take 10 mg by mouth Daily.   Taking   • folic acid (FOLVITE) 1 MG tablet Take 1 mg by mouth Daily.   Taking   • gabapentin (NEURONTIN) 400 MG capsule Take 400 mg by mouth 2 (Two) Times a Day.   Taking   • hydrALAZINE (APRESOLINE) 50 MG tablet Take 50 mg by mouth 3 (Three) Times a Day.   Taking   • HYDROcodone-acetaminophen (NORCO) 7.5-325 MG per tablet Take 1 tablet by mouth Every 6 (Six) Hours As Needed. for pain  0 Not Taking   • hydroxychloroquine (PLAQUENIL) 200 MG tablet Take 200 mg by mouth 2 (Two) Times a Day.      • insulin glargine (LANTUS) 100 UNIT/ML injection Inject 80 Units under the skin into the appropriate area as directed Every Morning.   Taking   • insulin lispro (HUMALOG) 100 UNIT/ML injection Inject 25 Units under the skin into the appropriate area as directed Daily Before Supper.   Taking   • insulin lispro (humaLOG) 100 UNIT/ML injection Inject 30 Units under the skin into the appropriate area as directed 2 (Two) Times a Day Before Meals. Breakfast and Lunch      • levothyroxine (SYNTHROID, LEVOTHROID) 50 MCG tablet Take 50 mcg by mouth Daily.   Taking   • Multiple Vitamins-Minerals (MULTI VITAMIN/MINERALS) tablet Take 1 tablet by mouth Daily.   Taking   • omeprazole (priLOSEC) 40 MG capsule Take 40 mg by mouth Daily.   Taking   •  pravastatin (PRAVACHOL) 40 MG tablet Take 80 mg by mouth Daily.      • pyridostigmine (MESTINON) 60 MG tablet Take 30 mg by mouth 2 (Two) Times a Day.  3 Taking   • tamsulosin (FLOMAX) 0.4 MG capsule 24 hr capsule Take 1 capsule by mouth Daily.   Taking   • vitamin B-6 (PYRIDOXINE) 100 MG tablet Take 100 mg by mouth Daily.   Taking   • vitamin D (ERGOCALCIFEROL) 83666 units capsule capsule Take 50,000 Units by mouth Every 30 (Thirty) Days.   Taking       Allergies:  Patient has no known allergies.    Immunization History   Administered Date(s) Administered   • Fluzone High Dose =>65 Years ([employer] ONLY) 09/27/2017   • Hepatitis A 08/21/2018   • Pneumococcal Conjugate 13-Valent (PCV13) 09/27/2017   • Pneumococcal, Unspecified 11/01/2011       REVIEW OF SYSTEMS:   CONSTITUTIONAL: The patient denies fevers, chills, sweats, and body ache.  HEENT: Denies blurry vision, eye pain. Endorses recurrent temporal headaches.  RESPIRATORY: Denies cough, sputum, hemoptysis.  CARDIAC: Denies chest pain, pressure, palpitations, irregular heartbeats. Denies lower extremity edema.  GASTROINTESTINAL: Denies abdominal pain.  NEUROLOGIC: Endorses history of headaches, dizziness, syncope.  MUSCULOSKELETAL: Positive for arthritis with joint stiffness in the morning. Limitation in range of motion.    Vital Signs  Temp:  [97.4 °F (36.3 °C)-98.7 °F (37.1 °C)] 97.8 °F (36.6 °C)  Heart Rate:  [80-90] 80  Resp:  [17-19] 19  BP: ()/(59-69) 100/64         Results Review:      Lab Results (most recent)     Procedure Component Value Units Date/Time    Sedimentation Rate [[id number]]  (Abnormal) Collected:  02/04/20 0441    Specimen:  Blood Updated:  02/04/20 0723     Sed Rate 46 mm/hr     Comprehensive Metabolic Panel [[id number]]  (Abnormal) Collected:  02/04/20 0441    Specimen:  Blood Updated:  02/04/20 0606     Glucose 229 mg/dL      BUN 45 mg/dL      Creatinine 1.93 mg/dL      Sodium 134 mmol/L      Potassium 3.0 mmol/L      Chloride 89  mmol/L      CO2 35.0 mmol/L      Calcium 9.2 mg/dL      Total Protein 6.0 g/dL      Albumin 3.50 g/dL      ALT (SGPT) 9 U/L      AST (SGOT) 17 U/L      Alkaline Phosphatase 40 U/L      Total Bilirubin 0.4 mg/dL      eGFR Non African Amer 34 mL/min/1.73      Globulin 2.5 gm/dL      A/G Ratio 1.4 g/dL      BUN/Creatinine Ratio 23.3     Anion Gap 10.0 mmol/L     Narrative:       GFR Normal >60  Chronic Kidney Disease <60  Kidney Failure <15      Lipid Panel [[id number]] Collected:  02/04/20 0441    Specimen:  Blood Updated:  02/04/20 0606     Total Cholesterol 150 mg/dL      Triglycerides 107 mg/dL      HDL Cholesterol 51 mg/dL      LDL Cholesterol  78 mg/dL      VLDL Cholesterol 21.4 mg/dL      LDL/HDL Ratio 1.52    Narrative:       Cholesterol Reference Ranges  (U.S. Department of Health and Human Services ATP III Classifications)    Desirable          <200 mg/dL  Borderline High    200-239 mg/dL  High Risk          >240 mg/dL      Triglyceride Reference Ranges  (U.S. Department of Health and Human Services ATP III Classifications)    Normal           <150 mg/dL  Borderline High  150-199 mg/dL  High             200-499 mg/dL  Very High        >500 mg/dL    HDL Reference Ranges  (U.S. Department of Health and Human Services ATP III Classifcations)    Low     <40 mg/dl (major risk factor for CHD)  High    >60 mg/dl ('negative' risk factor for CHD)        LDL Reference Ranges  (U.S. Department of Health and Human Services ATP III Classifcations)    Optimal          <100 mg/dL  Near Optimal     100-129 mg/dL  Borderline High  130-159 mg/dL  High             160-189 mg/dL  Very High        >189 mg/dL    CK [[id number]]  (Normal) Collected:  02/04/20 0441    Specimen:  Blood Updated:  02/04/20 0606     Creatine Kinase 123 U/L     Uric Acid [[id number]]  (Abnormal) Collected:  02/04/20 0441    Specimen:  Blood Updated:  02/04/20 0606     Uric Acid 9.3 mg/dL     Phosphorus [[id number]]  (Normal) Collected:  02/04/20 0441     Specimen:  Blood Updated:  02/04/20 0606     Phosphorus 4.0 mg/dL     Magnesium [[id number]]  (Normal) Collected:  02/04/20 0441    Specimen:  Blood Updated:  02/04/20 0606     Magnesium 2.0 mg/dL     C-reactive Protein [[id number]]  (Abnormal) Collected:  02/04/20 0441    Specimen:  Blood Updated:  02/04/20 0606     C-Reactive Protein 1.98 mg/dL     Ferritin [[id number]]  (Normal) Collected:  02/04/20 0441    Specimen:  Blood Updated:  02/04/20 0601     Ferritin 171.60 ng/mL     Narrative:       Results may be falsely decreased if patient taking Biotin.      TSH [[id number]]  (Normal) Collected:  02/04/20 0441    Specimen:  Blood Updated:  02/04/20 0601     TSH 3.830 uIU/mL     BNP [[id number]]  (Normal) Collected:  02/04/20 0441    Specimen:  Blood Updated:  02/04/20 0601     proBNP 469.7 pg/mL     Narrative:       Among patients with dyspnea, NT-proBNP is highly sensitive for the detection of acute congestive heart failure. In addition NT-proBNP of <300 pg/ml effectively rules out acute congestive heart failure with 99% negative predictive value.    Results may be falsely decreased if patient taking Biotin.      Troponin [[id number]]  (Normal) Collected:  02/04/20 0441    Specimen:  Blood Updated:  02/04/20 0555     Troponin T 0.029 ng/mL     Narrative:       Troponin T Reference Range:  <= 0.03 ng/mL-   Negative for AMI  >0.03 ng/mL-     Abnormal for myocardial necrosis.  Clinicians would have to utilize clinical acumen, EKG, Troponin and serial changes to determine if it is an Acute Myocardial Infarction or myocardial injury due to an underlying chronic condition.       Results may be falsely decreased if patient taking Biotin.      CBC & Differential [[id number]] Collected:  02/04/20 0441    Specimen:  Blood Updated:  02/04/20 0543    Narrative:       The following orders were created for panel order CBC & Differential.  Procedure                               Abnormality         Status                     ---------                                -----------         ------                     CBC Auto Differential[[id number]]        Abnormal            Final result                 Please view results for these tests on the individual orders.    CBC Auto Differential [[id number]]  (Abnormal) Collected:  02/04/20 0441    Specimen:  Blood Updated:  02/04/20 0543     WBC 5.20 10*3/mm3      RBC 3.98 10*6/mm3      Hemoglobin 11.9 g/dL      Hematocrit 35.3 %      MCV 88.5 fL      MCH 30.0 pg      MCHC 33.9 g/dL      RDW 14.7 %      RDW-SD 45.1 fl      MPV 7.3 fL      Platelets 161 10*3/mm3      Neutrophil % 62.6 %      Lymphocyte % 26.5 %      Monocyte % 5.3 %      Eosinophil % 4.8 %      Basophil % 0.8 %      Neutrophils, Absolute 3.20 10*3/mm3      Lymphocytes, Absolute 1.40 10*3/mm3      Monocytes, Absolute 0.30 10*3/mm3      Eosinophils, Absolute 0.20 10*3/mm3      Basophils, Absolute 0.00 10*3/mm3      nRBC 0.1 /100 WBC     Vitamin B12 [[id number]] Collected:  02/04/20 0441    Specimen:  Blood Updated:  02/04/20 0522    Hemoglobin A1c [[id number]] Collected:  02/04/20 0441    Specimen:  Blood Updated:  02/04/20 0520    POC Glucose Once [[id number]]  (Abnormal) Collected:  02/03/20 2142    Specimen:  Blood Updated:  02/03/20 2143     Glucose 213 mg/dL      Comment: Serial Number: [id number]:  [id number]       Basic Metabolic Panel [[id number]]  (Abnormal) Collected:  02/03/20 1648    Specimen:  Blood Updated:  02/03/20 1732     Glucose 177 mg/dL      BUN 48 mg/dL      Creatinine 2.16 mg/dL      Sodium 134 mmol/L      Potassium 3.3 mmol/L      Chloride 87 mmol/L      CO2 32.0 mmol/L      Calcium 9.4 mg/dL      eGFR Non African Amer 30 mL/min/1.73      BUN/Creatinine Ratio 22.2     Anion Gap 15.0 mmol/L     Narrative:       GFR Normal >60  Chronic Kidney Disease <60  Kidney Failure <15      POC Glucose Once [[id number]]  (Abnormal) Collected:  02/03/20 1637    Specimen:  Blood Updated:  02/03/20 1641     Glucose 177 mg/dL      Comment:  Serial Number: [id number]:  [id number]       Sedimentation Rate [[id number]]  (Abnormal) Collected:  02/03/20 0655    Specimen:  Blood Updated:  02/03/20 0759     Sed Rate 46 mm/hr     Extra Tubes [[id number]] Collected:  02/02/20 1828    Specimen:  Blood, Venous Line Updated:  02/02/20 1930    Narrative:       The following orders were created for panel order Extra Tubes.  Procedure                               Abnormality         Status                     ---------                               -----------         ------                     Gold Top - SST[[id number]]                                   Final result                 Please view results for these tests on the individual orders.    Gold Top - SST [[id number]] Collected:  02/02/20 1828    Specimen:  Blood Updated:  02/02/20 1930     Extra Tube Hold for add-ons.     Comment: Auto resulted.       Troponin [[id number]]  (Abnormal) Collected:  02/02/20 1828    Specimen:  Blood Updated:  02/02/20 1912     Troponin T 0.036 ng/mL     Narrative:       Troponin T Reference Range:  <= 0.03 ng/mL-   Negative for AMI  >0.03 ng/mL-     Abnormal for myocardial necrosis.  Clinicians would have to utilize clinical acumen, EKG, Troponin and serial changes to determine if it is an Acute Myocardial Infarction or myocardial injury due to an underlying chronic condition.       Results may be falsely decreased if patient taking Biotin.      C-reactive Protein [[id number]]  (Abnormal) Collected:  02/02/20 1503    Specimen:  Blood Updated:  02/02/20 1556     C-Reactive Protein 2.64 mg/dL     Basic Metabolic Panel [[id number]]  (Abnormal) Collected:  02/02/20 0440    Specimen:  Blood Updated:  02/02/20 0530     Glucose 258 mg/dL      BUN 47 mg/dL      Creatinine 2.42 mg/dL      Sodium 132 mmol/L      Potassium 3.2 mmol/L      Chloride 88 mmol/L      CO2 31.0 mmol/L      Calcium 9.5 mg/dL      eGFR Non African Amer 26 mL/min/1.73      BUN/Creatinine Ratio 19.4     Anion Gap 13.0  mmol/L     Narrative:       GFR Normal >60  Chronic Kidney Disease <60  Kidney Failure <15      TSH [[id number]]  (Normal) Collected:  02/02/20 0440    Specimen:  Blood Updated:  02/02/20 0524     TSH 1.840 uIU/mL     CBC Auto Differential [[id number]]  (Abnormal) Collected:  02/02/20 0440    Specimen:  Blood Updated:  02/02/20 0458     WBC 7.70 10*3/mm3      RBC 3.88 10*6/mm3      Hemoglobin 11.6 g/dL      Hematocrit 34.7 %      MCV 89.3 fL      MCH 29.9 pg      MCHC 33.5 g/dL      RDW 14.9 %      RDW-SD 46.8 fl      MPV 7.3 fL      Platelets 156 10*3/mm3      Neutrophil % 73.8 %      Lymphocyte % 17.6 %      Monocyte % 6.0 %      Eosinophil % 2.0 %      Basophil % 0.6 %      Neutrophils, Absolute 5.70 10*3/mm3      Lymphocytes, Absolute 1.40 10*3/mm3      Monocytes, Absolute 0.50 10*3/mm3      Eosinophils, Absolute 0.20 10*3/mm3      Basophils, Absolute 0.00 10*3/mm3      nRBC 0.1 /100 WBC     Urinalysis With Culture If Indicated - Urine, Clean Catch [[id number]]  (Abnormal) Collected:  02/02/20 0303    Specimen:  Urine, Clean Catch Updated:  02/02/20 0313     Color, UA Yellow     Appearance, UA Clear     pH, UA 6.0     Specific Gravity, UA 1.009     Glucose,  mg/dL (Trace)     Ketones, UA Negative     Bilirubin, UA Negative     Blood, UA Negative     Protein, UA Negative     Leuk Esterase, UA Negative     Nitrite, UA Negative     Urobilinogen, UA 0.2 E.U./dL    Narrative:       Urine microscopic not indicated.    Protime-INR [[id number]]  (Abnormal) Collected:  02/02/20 0121    Specimen:  Blood Updated:  02/02/20 0137     Protime 11.7 Seconds      INR 1.14    aPTT [[id number]]  (Normal) Collected:  02/02/20 0121    Specimen:  Blood Updated:  02/02/20 0137     PTT 28.6 seconds     CBC & Differential [[id number]] Collected:  02/02/20 0121    Specimen:  Blood Updated:  02/02/20 0126    Narrative:       The following orders were created for panel order CBC & Differential.  Procedure                                Abnormality         Status                     ---------                               -----------         ------                     CBC Auto Differential[[id number]]        Abnormal            Final result                 Please view results for these tests on the individual orders.          Imaging Results (Most Recent)     Procedure Component Value Units Date/Time    XR Shoulder 2+ View Right [[id number]] Collected:  02/02/20 0932     Updated:  02/02/20 0936    Narrative:       DATE OF EXAM:  2/2/2020 1:25 AM     PROCEDURE:  XR SHOULDER 2+ VW RIGHT-     INDICATIONS:  Fall, right shoulder pain     COMPARISON:  No Comparisons Available     TECHNIQUE:   2 or more views of the right shoulder were obtained.     FINDINGS:  No definite acute fracture or malalignment is seen. There is osteophyte  formation with mild to moderate glenohumeral joint space narrowing.  There appear to be moderate to severe degenerative changes at the  acromioclavicular joint with undersurface osteophytes. There is mild  enthesopathy at the greater tuberosity. Mineralization appears grossly  normal. Soft tissues appear unremarkable.       Impression:       1.No radiographic findings of acute osseous shoulder abnormality.  2.Moderate to severe, clavicular joint degeneration and mild to moderate  glenohumeral osteoarthritis.  3.Mild enthesopathy at the greater tuberosity, which could be associated  with rotator cuff pathology.     Electronically Signed By-DR. Oliver Patterson MD On:2/2/2020 9:34 AM  This report was finalized on [id number] by DR. Oliver Patterson MD.    XR Ribs Bilateral 4+ View With PA Chest [[id number]] Collected:  02/02/20 0929     Updated:  02/02/20 0934    Narrative:       DATE OF EXAM:  2/2/2020 3:00 AM     PROCEDURE:  XR RIBS BILATERAL 4+ VW W PA CHEST-     INDICATIONS:  Fall, bilateral rib pain     COMPARISON:  03/06/2018     TECHNIQUE:   A minimum of four radiologic views of the bilateral ribs with a PA chest  were  obtained.        FINDINGS:  No focal or diffuse pulmonary infiltrate is seen. Heart size appears  mildly prominent, as before. Mediastinal contour appears grossly  unchanged with mild tortuosity of the thoracic aorta. There is a left  chest wall pacemaker with electrodes projecting in similar positions. No  pneumothorax or effusion is identified. Postoperative changes are seen  in the lower cervical spine. There are degenerative changes in the  thoracic spine. No definite acute displaced rib fracture is identified.  There are moderate degenerative changes at the bilateral glenohumeral  and acromioclavicular joints.        Impression:       1.No radiographic evidence of an acute displaced rib fracture.  A  nondisplaced fracture could be radiographically occult.  2.No radiographic findings of acute cardiopulmonary abnormality.     Electronically Signed By-DR. Oliver Patterson MD On:2/2/2020 9:32 AM  This report was finalized on [id number] by DR. Oliver Patterson MD.    XR Hips Bilateral With or Without Pelvis 2 View [[id number]] Collected:  02/02/20 0925     Updated:  02/02/20 0934    Narrative:       DATE OF EXAM:  2/2/2020 3:00 AM     PROCEDURE:  XR HIPS BILATERAL W OR WO PELVIS 2 VIEW-     INDICATIONS:  Fall, pain in both hips. History of right hip arthroplasty.     COMPARISON:  04/22/2019     TECHNIQUE:   AP pelvis, AP hips, and frog-leg lateral hip views were obtained.     FINDINGS:  Status post right hip arthroplasty. There is heterotopic ossification at  the right hip. Hardware components appear in similar positions. There  appears to be chronic periosteal reaction at the lateral aspect of the  proximal right femur, unchanged compared to the prior exam. No definite  new hardware complication is identified.     There is osteophyte formation at the left hip with mild joint space  narrowing, as before. There is enthesophyte formation at the bilateral  trochanters and iliac wings initial tuberosities. No definite  new  displaced fracture is seen. There are degenerative changes at the  sacroiliac joints and pubic symphysis. Soft tissues appear unremarkable.          Impression:       1.No definite radiographic evidence of acute osseous hip or pelvic  injury at this time.  2.Status post right hip arthroplasty with heterotopic ossification and  suspected chronic periosteal reaction along the proximal right femur, as  before.  3.Mild left hip osteoarthritis. Degenerative changes are also noted at  the sacroiliac joints and pubic symphysis.     Electronically Signed By-DR. Oliver Patterson MD On:2/2/2020 9:29 AM  This report was finalized on [id number] by DR. Oliver Patterson MD.    XR Ankle 3+ View Left [[id number]] Collected:  02/02/20 0907     Updated:  02/02/20 0911    Narrative:       DATE OF EXAM:  2/2/2020 1:25 AM     PROCEDURE:  XR ANKLE 3+ VW LEFT-     INDICATIONS:  Fall, left ankle pain.     COMPARISON:  No Comparisons Available     TECHNIQUE:   A minimum of three routine standard radiographic views were obtained of  the left ankle.     FINDINGS:  There appears to be diffuse soft tissue prominence of the visualized  lower leg and ankle.  There is mild osteophyte formation and joint space  narrowing at the tibiotalar joint. There is also suspected mild  degenerative changes at the subtalar and midfoot joints. Ankle mortise  alignment appears within normal limits. Osseous structures appear mildly  demineralized. There is mild enthesophyte formation at the Achilles  insertion. No definite displaced fracture is identified.       Impression:       1.No radiographic findings of acute osseous left ankle injury.  2.Nonspecific diffuse soft tissue edema.  3.Mild osteoarthritis of the tibiotalar, subtalar, and midfoot joints.  4.Osseous demineralization.     Electronically Signed By-DR. Oliver Patterson MD On:2/2/2020 9:09 AM  This report was finalized on [id number] by DR. Oliver Patterson MD.    XR Hand 3+ View Right [[id number]]  Collected:  02/02/20 0906     Updated:  02/02/20 0909    Narrative:       DATE OF EXAM:  2/2/2020 1:25 AM     PROCEDURE:  XR HAND 3+ VW RIGHT-     INDICATIONS:  Fall, pain in the right third digit     COMPARISON:  07/22/2019     TECHNIQUE:   A minimum of three routine standard radiographic views were obtained of  the right hand.     FINDINGS:  Osseous structures are demineralized.  There is suspected mild to  moderate osteoarthritis of the interphalangeal joints and wrist. Osseous  alignment appears within normal limits. No displaced fracture is seen.  Soft tissues appear grossly unremarkable.        Impression:       1.No radiographic findings of acute osseous hand injury.  2.Mild to moderate osteoarthritis and osseous demineralization.     Electronically Signed By-DR. Oliver Patterson MD On:2/2/2020 9:07 AM  This report was finalized on [id number] by DR. Oliver Patterson MD.    XR Knee 1 or 2 View Right [[id number]] Collected:  02/02/20 0844     Updated:  02/02/20 0848    Narrative:       DATE OF EXAM:  2/2/2020 3:00 AM     PROCEDURE:  XR KNEE 1 OR 2 VW RIGHT-     INDICATIONS:  Fall, right anterior knee pain     COMPARISON:  No Comparisons Available     TECHNIQUE:   One to two radiologic views of the right knee were obtained.        FINDINGS:  There is osteophytosis with mild tricompartmental joint space narrowing.  Mineralization appears within normal limits. There is enthesophyte  formation at the superior pole of the patella. No displaced fracture or  acute malalignment is identified. No definite knee effusion. There is  calcific atherosclerosis of the visualized lower extremity arteries. No  definite acute soft tissue abnormality is seen.        Impression:       1.No radiographic findings of acute osseous abnormality.  2.Mild tricompartmental osteoarthritis.     Electronically Signed By-DR. Oliver Patterson MD On:2/2/2020 8:45 AM  This report was finalized on [id number] by DR. Oliver Patterson MD.    CT Head  Without Contrast [[id number]] Collected:  02/02/20 0036     Updated:  02/02/20 0247    Narrative:       EXAMINATION: CT HEAD AND CERVICAL SPINE WITHOUT CONTRAST    DATE: 2/2/2020 2:04 AM    INDICATION: Syncope, head trauma    COMPARISON:CT head and cervical spine 4/22/2019    TECHNIQUE: Noncontrast imaging obtained from the vertex to the skull base. Axial noncontrast imaging through the cervical spine followed by coronal and sagittal reformats.  CT dose lowering techniques were used, to include: automated exposure control,   adjustment for patient size, and or use of iterative reconstruction.?    FINDINGS:    CT head:    Soft Tissues: Mild right frontal scalp soft tissue swelling    Skull: No underlying skull fracture or radiopaque foreign body.    Sinuses: Paranasal sinuses are clear.    Mastoids: Mastoid air cells are clear.     Globes and Orbits: Globes and orbits are intact.    Brain: No acute hemorrhage.  No midline shift, masses, or mass effect.  No evidence of acute infarct by noncontrast CT.    Ventricles and Cisterns: Ventricular size and configuration is within normal limits. Basal cisterns are patent. No abnormal extra-axial fluid collection.    Senescent Changes: Mild to moderate volume loss. Mild white matter hypoattenuation favoring chronic microvascular ischemic changes.      CT cervical spine:    Osseous:    Cervical lordosis is maintained.    Vertebral body height and alignment is preserved.    No acute fracture or subluxation.    No prevertebral soft tissue swelling. The lateral masses of C1 align on C2.      Degenerative Changes    Multilevel anterior bridging osteophytosis throughout the cervical spine.    C2-3:Mild facet arthropathy with mild bilateral foraminal narrowing.    C3-4:Mild to moderate facet arthropathy and uncovertebral hypertrophy. Severe bilateral foraminal stenosis. Disc osteophyte complex effaces the ventral subarachnoid space and flattens the ventral thecal sac. AP thecal sac  diameter is approximately 4 mm.    C4-5:Mild to moderate facet arthropathy and uncovertebral hypertrophy. Moderate left and mild-to-moderate right foraminal narrowing. Disc osteophyte complex effaces the ventral subarachnoid space and contours the ventral thecal sac. AP thecal sac   diameter is approximately 8 mm.    C5-6:Mild facet arthropathy and uncovertebral hypertrophy. Moderate to severe bilateral foraminal stenosis. Disc osteophyte complex narrows the ventral subarachnoid space.    C6-7:Mild facet arthropathy and uncovertebral hypertrophy. Severe right and moderate left foraminal stenosis.    C7-T1:Mild facet arthropathy and uncovertebral hypertrophy. Mild foraminal narrowing. Disc osteophyte complex effaces the ventral subarachnoid space and flattens the ventral thecal sac. AP thecal sac diameter is approximately 6 mm.    Left facet-spinous process screw fixation from C4 through C7.      Soft Tissues of the Neck:      No focal soft tissue abnormality within the visualized neck.    Visualized lung apices are clear. Visualized airways are patent.        Impression:         CT head:      1. Mild right frontal scalp soft tissue swelling.    2. No acute intracranial abnormality.    3. Mild volume loss and chronic microvascular ischemic changes.        CT cervical spine:      1. No acute fracture or traumatic subluxation.    2. Moderate multilevel degenerative disc disease and facet arthropathy which is most pronounced at C3-4 with moderate to severe canal stenosis.            Jevon Kauffman M.D.   Neuroradiologist     [facility]  [email or web address]    Thank you for this referral. This exam was interpreted by a fellowship trained neuroradiologist with subspeciality training in Neuroradiology.      SLOT  68     Electronically signed by:  Jevon Kauffman M.D.    2/2/2020 12:46 AM    CT Cervical Spine Without Contrast [[id number]] Collected:  02/02/20 0036     Updated:  02/02/20 0247    Narrative:        EXAMINATION: CT HEAD AND CERVICAL SPINE WITHOUT CONTRAST    DATE: 2/2/2020 2:04 AM    INDICATION: Syncope, head trauma    COMPARISON:CT head and cervical spine 4/22/2019    TECHNIQUE: Noncontrast imaging obtained from the vertex to the skull base. Axial noncontrast imaging through the cervical spine followed by coronal and sagittal reformats.  CT dose lowering techniques were used, to include: automated exposure control,   adjustment for patient size, and or use of iterative reconstruction.?    FINDINGS:    CT head:    Soft Tissues: Mild right frontal scalp soft tissue swelling    Skull: No underlying skull fracture or radiopaque foreign body.    Sinuses: Paranasal sinuses are clear.    Mastoids: Mastoid air cells are clear.     Globes and Orbits: Globes and orbits are intact.    Brain: No acute hemorrhage.  No midline shift, masses, or mass effect.  No evidence of acute infarct by noncontrast CT.    Ventricles and Cisterns: Ventricular size and configuration is within normal limits. Basal cisterns are patent. No abnormal extra-axial fluid collection.    Senescent Changes: Mild to moderate volume loss. Mild white matter hypoattenuation favoring chronic microvascular ischemic changes.      CT cervical spine:    Osseous:    Cervical lordosis is maintained.    Vertebral body height and alignment is preserved.    No acute fracture or subluxation.    No prevertebral soft tissue swelling. The lateral masses of C1 align on C2.      Degenerative Changes    Multilevel anterior bridging osteophytosis throughout the cervical spine.    C2-3:Mild facet arthropathy with mild bilateral foraminal narrowing.    C3-4:Mild to moderate facet arthropathy and uncovertebral hypertrophy. Severe bilateral foraminal stenosis. Disc osteophyte complex effaces the ventral subarachnoid space and flattens the ventral thecal sac. AP thecal sac diameter is approximately 4 mm.    C4-5:Mild to moderate facet arthropathy and uncovertebral hypertrophy.  Moderate left and mild-to-moderate right foraminal narrowing. Disc osteophyte complex effaces the ventral subarachnoid space and contours the ventral thecal sac. AP thecal sac   diameter is approximately 8 mm.    C5-6:Mild facet arthropathy and uncovertebral hypertrophy. Moderate to severe bilateral foraminal stenosis. Disc osteophyte complex narrows the ventral subarachnoid space.    C6-7:Mild facet arthropathy and uncovertebral hypertrophy. Severe right and moderate left foraminal stenosis.    C7-T1:Mild facet arthropathy and uncovertebral hypertrophy. Mild foraminal narrowing. Disc osteophyte complex effaces the ventral subarachnoid space and flattens the ventral thecal sac. AP thecal sac diameter is approximately 6 mm.    Left facet-spinous process screw fixation from C4 through C7.      Soft Tissues of the Neck:      No focal soft tissue abnormality within the visualized neck.    Visualized lung apices are clear. Visualized airways are patent.        Impression:         CT head:      1. Mild right frontal scalp soft tissue swelling.    2. No acute intracranial abnormality.    3. Mild volume loss and chronic microvascular ischemic changes.        CT cervical spine:      1. No acute fracture or traumatic subluxation.    2. Moderate multilevel degenerative disc disease and facet arthropathy which is most pronounced at C3-4 with moderate to severe canal stenosis.            Jevon Kauffman M.D.   Neuroradiologist     [facility]  [email or web address]    Thank you for this referral. This exam was interpreted by a fellowship trained neuroradiologist with subspeciality training in Neuroradiology.      SLOT  68     Electronically signed by:  Jevon Kauffman M.D.    2/2/2020 12:46 AM            ECG/EMG Results (most recent)     Procedure Component Value Units Date/Time    ECG 12 Lead [[id number]] Collected:  02/02/20 0100     Updated:  02/02/20 0745    Narrative:       HEART RATE= 86  bpm  RR Interval= 696  ms  LA  Interval= 187  ms  P Horizontal Axis= 223  deg  P Front Axis= -71  deg  QRSD Interval= 184  ms  QT Interval= 453  ms  QRS Axis= -58  deg  T Wave Axis= 112  deg  - ABNORMAL ECG -  Atrial-ventricular dual-paced rhythm  When compared with ECG of 22-Apr-2019 11:26:20,  No significant change  Electronically Signed By: Michael Bearden (Fabio) 02-Feb-2020 07:45:13  Date and Time of Study: 2020-02-02 01:00:08    Adult Transthoracic Echo Complete W/ Cont if Necessary Per Protocol [[id number]] Collected:  02/03/20 1421     Updated:  02/03/20 1618     BSA 2.3 m^2      BH CV ECHO STEPHEN - RVDD 3.5 cm      IVSd 1.5 cm      LVIDd 5.0 cm      LVIDs 3.7 cm      LVPWd 1.6 cm      IVS/LVPW 0.91     FS 24.8 %      EDV(Teich) 116.7 ml      ESV(Teich) 59.6 ml      EF(Teich) 48.9 %      EDV(cubed) 122.9 ml      ESV(cubed) 52.3 ml      EF(cubed) 57.5 %      LV mass(C)d 335.7 grams      LV mass(C)dI 143.0 grams/m^2      SV(Teich) 57.1 ml      SI(Teich) 24.3 ml/m^2      SV(cubed) 70.6 ml      SI(cubed) 30.1 ml/m^2      Ao root diam 3.9 cm      Ao root area 11.9 cm^2      ACS 2.3 cm      asc Aorta Diam 3.6 cm      LVOT diam 2.1 cm      LVOT area 3.6 cm^2      EDV(MOD-sp4) 62.1 ml      ESV(MOD-sp4) 23.4 ml      EF(MOD-sp4) 62.3 %      EDV(MOD-sp2) 21.4 ml      ESV(MOD-sp2) 13.4 ml      EF(MOD-sp2) 37.2 %      SV(MOD-sp4) 38.7 ml      SI(MOD-sp4) 16.5 ml/m^2      SV(MOD-sp2) 8.0 ml      SI(MOD-sp2) 3.4 ml/m^2      Ao root area (BSA corrected) 1.7     LV Dowell Vol (BSA corrected) 26.5 ml/m^2      LV Sys Vol (BSA corrected) 10.0 ml/m^2      MV E max sagrario 25.1 cm/sec      MV A max sagrario 42.0 cm/sec      MV E/A 0.6     MV V2 max 84.8 cm/sec      MV max PG 2.9 mmHg      MV V2 mean 44.7 cm/sec      MV mean PG 0.98 mmHg      MV V2 VTI 19.8 cm      MVA(VTI) 2.2 cm^2      MV dec slope 281.2 cm/sec^2      MV dec time 0.17 sec      Ao pk sagrario 117.6 cm/sec      Ao max PG 5.5 mmHg      Ao max PG (full) 3.0 mmHg      Ao V2 mean 83.7 cm/sec      Ao mean PG 3.1 mmHg       Ao mean PG (full) 1.6 mmHg      Ao V2 VTI 19.1 cm      MELVINA(I,A) 2.3 cm^2      MELVINA(I,D) 2.3 cm^2      MELVINA(V,A) 2.4 cm^2      MELVINA(V,D) 2.4 cm^2      LV V1 max PG 2.5 mmHg      LV V1 mean PG 1.4 mmHg      LV V1 max 79.2 cm/sec      LV V1 mean 55.4 cm/sec      LV V1 VTI 12.1 cm      SV(Ao) 227.3 ml      SI(Ao) 96.8 ml/m^2      SV(LVOT) 43.4 ml      SI(LVOT) 18.5 ml/m^2      PA V2 max 89.7 cm/sec      PA max PG 3.2 mmHg      PA max PG (full) 0.88 mmHg      PA acc time 0.12 sec      RV V1 max PG 2.3 mmHg      RV V1 mean PG 1.1 mmHg      RV V1 max 76.5 cm/sec      RV V1 mean 47.6 cm/sec      RV V1 VTI 14.5 cm      TR max sagrario 216.1 cm/sec      RVSP(TR) 26.7 mmHg      RAP systole 8.0 mmHg      PA pr(Accel) 27.1 mmHg       CV ECHO STEPHEN - BZI_BMI 42.1 kilograms/m^2       CV ECHO STEPHEN - BSA(HAYCOCK) 2.5 m^2       CV ECHO STEPHEN - BZI_METRIC_WEIGHT 125.6 kg       CV ECHO STEPHEN - BZI_METRIC_HEIGHT 172.7 cm      EF(MOD-bp) 53.0 %      LA dimension(2D) 4.2 cm     ECG 12 Lead [[id number]] Collected:  02/03/20 1636     Updated:  02/03/20 1637    Narrative:       HEART RATE= 116  bpm  RR Interval= 519  ms  SD Interval= 98  ms  P Horizontal Axis= 238  deg  P Front Axis= 161  deg  QRSD Interval= 186  ms  QT Interval= 445  ms  QRS Axis= -51  deg  T Wave Axis= 115  deg  - ABNORMAL ECG -  Ventricular-paced rhythm  Electronically Signed By:   Date and Time of Study: 2020-02-03 16:36:38        Physical Exam   Constitutional: He is oriented to person, place, and time and well-developed, well-nourished, and in no distress. No distress.   HENT:   Head: Normocephalic.   Healing abrasion of right forehead and right earlobe laceration   Eyes: Conjunctivae are normal.   Neck: Normal range of motion. Neck supple.   Cardiovascular: Normal rate, regular rhythm and normal heart sounds. Exam reveals no gallop and no friction rub.   No murmur heard.  Pulmonary/Chest: Effort normal and breath sounds normal. No respiratory distress. He has no wheezes.  He has no rales. He exhibits no tenderness.   Abdominal: Soft. Bowel sounds are normal. There is no tenderness.   Lymphadenopathy:     He has no cervical adenopathy.   Neurological: He is alert and oriented to person, place, and time.   Skin: Skin is warm. He is not diaphoretic.       Assessment/Plan     Active Hospital Problems    Diagnosis  POA   • Syncope and collapse [R55]  Yes   • Coronary artery disease [I25.10]  Yes   • Hypertension [I10]  Yes   • Anemia [D64.9]  Yes   • Chronic gout [M1A.9XX0]  Yes   • Presence of cardiac pacemaker [Z95.0]  Yes   • Renal insufficiency [N28.9]  Yes   • Inflammatory arthritis [M19.90]  Yes   • Sjogren's syndrome (CMS/HCC) [M35.00]  Yes   • Dermatitis [L30.9]  Yes   • Atrial fibrillation (CMS/HCC) [I48.91]  Yes   • Diabetic peripheral neuropathy (CMS/HCC) [E11.42]  Yes   • Degenerative joint disease [M19.90]  Yes   • Hyperlipidemia [E78.5]  Yes      Resolved Hospital Problems   No resolved problems to display.     Mr. Warner is a 74 y.o. male with a past medical history of CKD, Sjogren's syndrome, BPH, GERD, DM type II, HTN, RA, sick sinus syndrome with pacemaker, and hypothyroidism s/p thyroidectomy who is hospitalized for syncope evaluation.     Syncope  The cause of his syncopal episode has not been determined definitively, but it is increasingly likely that his episodes are caused by orthostatic hypotension. Echocardiogram revealed adequate EF and normal right-sided pressures. Venous doppler of lower extremities and carotid duplex were both normal. Head CT did not reveal concerning acute pathology. He is on several medications which can cause low blood pressure, including Flomax, hydralazine, and pyridostigmine. Hydralazine has been discontinued. A call was placed to the patient's primary care physician, who stated that the patient was prescribed pyridostigmine for tremor, but has not filled the prescription since May of 2019. The medication was discontinued. Time was spent  to discuss the pathophysiology of orthostatic hypotension and medications which can play a role.     GERD  --Continue omeprazole      Hypothyroidism with history of thyroid cancer with thyroidectomy and parathyroid, thymene involvement  TSH within normal limits.   --Continue calictriol  --Continue mestinon  --Continue calcium  --Continue Synthroid      Arrhythmia with sick sinus syndrome   Pacemaker interrogation did not reveal pacemaker dysfunction. However, the patient is currently having increasing episodes of atrial fibrillation and has a heart rate consistently at 120 AV paced rhythm. Cardiology recommending beta-blocker.   --Continue Eliquis      Rheumatoid arthritis  Most likely the cause of his mild ESR and CRP elevation.    --Continue Plaquenil  --Continue Neurontin  --Continue folic acid  --Continue Norco      BPH   --Continue Flomax      Anxiety  --Continue Prozac  --Continue Valium      Hypertension   BP stable but low.    --Continue to hold hydralazine      Diabetes type II  --Continue Neurontin  --Continue at-home regimen of Lantus and mealtime insulin      HLD  --Continue pravastatin       Disposition: Continue to monitor with potential discharge to take place tomorrow.     Signed,  KENNA Escobar     Loop Recorder

## 2023-10-23 NOTE — H&P PST ADULT - PROBLEM SELECTOR PLAN 3
Last note from Head and neck surgeon in chart. Clearances reviewed by anesthesia. Instructed pt to call back with trach size and whether it's cuffed ot uncuffed. Pt verbalized understanding. Pt had stroke in may 2023 - no residuals. Pt had neurology clearance - will obtain copy

## 2023-10-23 NOTE — H&P PST ADULT - PRO INTERPRETER NEED 2
Recommendations from today's MTM visit:                                                    MTM (medication therapy management) is a service provided by a clinical pharmacist designed to help you get the most of out of your medicines.   Today we reviewed what your medicines are for, how to know if they are working, that your medicines are safe and how to make your medicine regimen as easy as possible.    1. Lets increase the glimepiride dose to 4mg. Tablets --1 tab with breakfast and dinner daily.   2. Please check fasting blood sugar in AM before first meal and also check one more time during day -2 hours after any main meal.     Fyi: excellent range of blood sugars for you is keeping them all between 100-200.    Follow-up: Return in about 2 weeks (around 10/5/2021), or 10:00 AM  (Telephone call), for Blood sugar meter review, Medication Therapy Management Visit.    It was great to speak with you today.  I value your experience and would be very thankful for your time with providing feedback on our clinic survey. You may receive a survey via email or text message in the next few days.     To schedule another MTM appointment, please call the clinic directly or you may call the MTM scheduling line at [phone] or toll-free at [phone].     My Clinical Pharmacist's contact information:                                                      Please feel free to contact me with any questions or concerns you have.      Jose Sue Rph.  Medication Therapy Management Provider  [phone]     English

## 2023-10-23 NOTE — H&P PST ADULT - FUNCTIONAL STATUS
A Catheter Mapping 7fr Wbstr Cs 2-8-2mm Space Fj Crv catheter was inserted at the coronary sinus. The coronary sinus was paced and recorded.  Pt can walk up 2 flights of stairs and walk 4 blocks without difficulty/4-10 METS

## 2023-10-25 ENCOUNTER — APPOINTMENT (OUTPATIENT)
Dept: OPHTHALMOLOGY | Facility: HOSPITAL | Age: 73
End: 2023-10-25

## 2023-10-26 ENCOUNTER — APPOINTMENT (OUTPATIENT)
Dept: OPHTHALMOLOGY | Facility: CLINIC | Age: 73
End: 2023-10-26

## 2023-10-31 ENCOUNTER — TRANSCRIPTION ENCOUNTER (OUTPATIENT)
Age: 73
End: 2023-10-31

## 2023-10-31 ENCOUNTER — INPATIENT (INPATIENT)
Facility: HOSPITAL | Age: 73
LOS: 2 days | Discharge: HOME | DRG: 511 | End: 2023-11-03
Attending: INTERNAL MEDICINE | Admitting: HOSPITALIST
Payer: MEDICARE

## 2023-10-31 VITALS
WEIGHT: 169.09 LBS | OXYGEN SATURATION: 98 % | HEART RATE: 95 BPM | SYSTOLIC BLOOD PRESSURE: 116 MMHG | TEMPERATURE: 99 F | HEIGHT: 63 IN | DIASTOLIC BLOOD PRESSURE: 54 MMHG | RESPIRATION RATE: 17 BRPM

## 2023-10-31 DIAGNOSIS — Z98.89 OTHER SPECIFIED POSTPROCEDURAL STATES: Chronic | ICD-10-CM

## 2023-10-31 DIAGNOSIS — I63.9 CEREBRAL INFARCTION, UNSPECIFIED: ICD-10-CM

## 2023-10-31 DIAGNOSIS — E87.1 HYPO-OSMOLALITY AND HYPONATREMIA: ICD-10-CM

## 2023-10-31 DIAGNOSIS — Z90.13 ACQUIRED ABSENCE OF BILATERAL BREASTS AND NIPPLES: Chronic | ICD-10-CM

## 2023-10-31 DIAGNOSIS — Z29.9 ENCOUNTER FOR PROPHYLACTIC MEASURES, UNSPECIFIED: ICD-10-CM

## 2023-10-31 DIAGNOSIS — Z98.890 OTHER SPECIFIED POSTPROCEDURAL STATES: Chronic | ICD-10-CM

## 2023-10-31 DIAGNOSIS — S42.401A UNSPECIFIED FRACTURE OF LOWER END OF RIGHT HUMERUS, INITIAL ENCOUNTER FOR CLOSED FRACTURE: ICD-10-CM

## 2023-10-31 DIAGNOSIS — E03.9 HYPOTHYROIDISM, UNSPECIFIED: ICD-10-CM

## 2023-10-31 DIAGNOSIS — I10 ESSENTIAL (PRIMARY) HYPERTENSION: ICD-10-CM

## 2023-10-31 DIAGNOSIS — Z90.02 ACQUIRED ABSENCE OF LARYNX: Chronic | ICD-10-CM

## 2023-10-31 LAB
ALBUMIN SERPL ELPH-MCNC: 3.9 G/DL — SIGNIFICANT CHANGE UP (ref 3.3–5)
ALBUMIN SERPL ELPH-MCNC: 3.9 G/DL — SIGNIFICANT CHANGE UP (ref 3.3–5)
ALP SERPL-CCNC: 91 U/L — SIGNIFICANT CHANGE UP (ref 40–120)
ALP SERPL-CCNC: 91 U/L — SIGNIFICANT CHANGE UP (ref 40–120)
ALT FLD-CCNC: 45 U/L — SIGNIFICANT CHANGE UP (ref 10–45)
ALT FLD-CCNC: 45 U/L — SIGNIFICANT CHANGE UP (ref 10–45)
ANION GAP SERPL CALC-SCNC: 11 MMOL/L — SIGNIFICANT CHANGE UP (ref 5–17)
ANION GAP SERPL CALC-SCNC: 11 MMOL/L — SIGNIFICANT CHANGE UP (ref 5–17)
APTT BLD: 23.4 SEC — LOW (ref 24.5–35.6)
APTT BLD: 23.4 SEC — LOW (ref 24.5–35.6)
AST SERPL-CCNC: 48 U/L — HIGH (ref 10–40)
AST SERPL-CCNC: 48 U/L — HIGH (ref 10–40)
BASOPHILS # BLD AUTO: 0.03 K/UL — SIGNIFICANT CHANGE UP (ref 0–0.2)
BASOPHILS # BLD AUTO: 0.03 K/UL — SIGNIFICANT CHANGE UP (ref 0–0.2)
BASOPHILS NFR BLD AUTO: 0.6 % — SIGNIFICANT CHANGE UP (ref 0–2)
BASOPHILS NFR BLD AUTO: 0.6 % — SIGNIFICANT CHANGE UP (ref 0–2)
BILIRUB SERPL-MCNC: 0.3 MG/DL — SIGNIFICANT CHANGE UP (ref 0.2–1.2)
BILIRUB SERPL-MCNC: 0.3 MG/DL — SIGNIFICANT CHANGE UP (ref 0.2–1.2)
BLD GP AB SCN SERPL QL: NEGATIVE — SIGNIFICANT CHANGE UP
BLD GP AB SCN SERPL QL: NEGATIVE — SIGNIFICANT CHANGE UP
BUN SERPL-MCNC: 8 MG/DL — SIGNIFICANT CHANGE UP (ref 7–23)
BUN SERPL-MCNC: 8 MG/DL — SIGNIFICANT CHANGE UP (ref 7–23)
CALCIUM SERPL-MCNC: 9.5 MG/DL — SIGNIFICANT CHANGE UP (ref 8.4–10.5)
CALCIUM SERPL-MCNC: 9.5 MG/DL — SIGNIFICANT CHANGE UP (ref 8.4–10.5)
CHLORIDE SERPL-SCNC: 98 MMOL/L — SIGNIFICANT CHANGE UP (ref 96–108)
CHLORIDE SERPL-SCNC: 98 MMOL/L — SIGNIFICANT CHANGE UP (ref 96–108)
CO2 SERPL-SCNC: 22 MMOL/L — SIGNIFICANT CHANGE UP (ref 22–31)
CO2 SERPL-SCNC: 22 MMOL/L — SIGNIFICANT CHANGE UP (ref 22–31)
CREAT SERPL-MCNC: 0.53 MG/DL — SIGNIFICANT CHANGE UP (ref 0.5–1.3)
CREAT SERPL-MCNC: 0.53 MG/DL — SIGNIFICANT CHANGE UP (ref 0.5–1.3)
EGFR: 99 ML/MIN/1.73M2 — SIGNIFICANT CHANGE UP
EGFR: 99 ML/MIN/1.73M2 — SIGNIFICANT CHANGE UP
EOSINOPHIL # BLD AUTO: 0.26 K/UL — SIGNIFICANT CHANGE UP (ref 0–0.5)
EOSINOPHIL # BLD AUTO: 0.26 K/UL — SIGNIFICANT CHANGE UP (ref 0–0.5)
EOSINOPHIL NFR BLD AUTO: 5.1 % — SIGNIFICANT CHANGE UP (ref 0–6)
EOSINOPHIL NFR BLD AUTO: 5.1 % — SIGNIFICANT CHANGE UP (ref 0–6)
GLUCOSE SERPL-MCNC: 96 MG/DL — SIGNIFICANT CHANGE UP (ref 70–99)
GLUCOSE SERPL-MCNC: 96 MG/DL — SIGNIFICANT CHANGE UP (ref 70–99)
HCT VFR BLD CALC: 32.8 % — LOW (ref 34.5–45)
HCT VFR BLD CALC: 32.8 % — LOW (ref 34.5–45)
HGB BLD-MCNC: 11.4 G/DL — LOW (ref 11.5–15.5)
HGB BLD-MCNC: 11.4 G/DL — LOW (ref 11.5–15.5)
IMM GRANULOCYTES NFR BLD AUTO: 0.2 % — SIGNIFICANT CHANGE UP (ref 0–0.9)
IMM GRANULOCYTES NFR BLD AUTO: 0.2 % — SIGNIFICANT CHANGE UP (ref 0–0.9)
INR BLD: 0.85 RATIO — SIGNIFICANT CHANGE UP (ref 0.85–1.18)
INR BLD: 0.85 RATIO — SIGNIFICANT CHANGE UP (ref 0.85–1.18)
LYMPHOCYTES # BLD AUTO: 1.14 K/UL — SIGNIFICANT CHANGE UP (ref 1–3.3)
LYMPHOCYTES # BLD AUTO: 1.14 K/UL — SIGNIFICANT CHANGE UP (ref 1–3.3)
LYMPHOCYTES # BLD AUTO: 22.4 % — SIGNIFICANT CHANGE UP (ref 13–44)
LYMPHOCYTES # BLD AUTO: 22.4 % — SIGNIFICANT CHANGE UP (ref 13–44)
MCHC RBC-ENTMCNC: 33 PG — SIGNIFICANT CHANGE UP (ref 27–34)
MCHC RBC-ENTMCNC: 33 PG — SIGNIFICANT CHANGE UP (ref 27–34)
MCHC RBC-ENTMCNC: 34.8 GM/DL — SIGNIFICANT CHANGE UP (ref 32–36)
MCHC RBC-ENTMCNC: 34.8 GM/DL — SIGNIFICANT CHANGE UP (ref 32–36)
MCV RBC AUTO: 95.1 FL — SIGNIFICANT CHANGE UP (ref 80–100)
MCV RBC AUTO: 95.1 FL — SIGNIFICANT CHANGE UP (ref 80–100)
MONOCYTES # BLD AUTO: 0.42 K/UL — SIGNIFICANT CHANGE UP (ref 0–0.9)
MONOCYTES # BLD AUTO: 0.42 K/UL — SIGNIFICANT CHANGE UP (ref 0–0.9)
MONOCYTES NFR BLD AUTO: 8.3 % — SIGNIFICANT CHANGE UP (ref 2–14)
MONOCYTES NFR BLD AUTO: 8.3 % — SIGNIFICANT CHANGE UP (ref 2–14)
NEUTROPHILS # BLD AUTO: 3.23 K/UL — SIGNIFICANT CHANGE UP (ref 1.8–7.4)
NEUTROPHILS # BLD AUTO: 3.23 K/UL — SIGNIFICANT CHANGE UP (ref 1.8–7.4)
NEUTROPHILS NFR BLD AUTO: 63.4 % — SIGNIFICANT CHANGE UP (ref 43–77)
NEUTROPHILS NFR BLD AUTO: 63.4 % — SIGNIFICANT CHANGE UP (ref 43–77)
NRBC # BLD: 0 /100 WBCS — SIGNIFICANT CHANGE UP (ref 0–0)
NRBC # BLD: 0 /100 WBCS — SIGNIFICANT CHANGE UP (ref 0–0)
PLATELET # BLD AUTO: 207 K/UL — SIGNIFICANT CHANGE UP (ref 150–400)
PLATELET # BLD AUTO: 207 K/UL — SIGNIFICANT CHANGE UP (ref 150–400)
POTASSIUM SERPL-MCNC: 5.1 MMOL/L — SIGNIFICANT CHANGE UP (ref 3.5–5.3)
POTASSIUM SERPL-MCNC: 5.1 MMOL/L — SIGNIFICANT CHANGE UP (ref 3.5–5.3)
POTASSIUM SERPL-SCNC: 5.1 MMOL/L — SIGNIFICANT CHANGE UP (ref 3.5–5.3)
POTASSIUM SERPL-SCNC: 5.1 MMOL/L — SIGNIFICANT CHANGE UP (ref 3.5–5.3)
PROT SERPL-MCNC: 6.8 G/DL — SIGNIFICANT CHANGE UP (ref 6–8.3)
PROT SERPL-MCNC: 6.8 G/DL — SIGNIFICANT CHANGE UP (ref 6–8.3)
PROTHROM AB SERPL-ACNC: 9.4 SEC — LOW (ref 9.5–13)
PROTHROM AB SERPL-ACNC: 9.4 SEC — LOW (ref 9.5–13)
RBC # BLD: 3.45 M/UL — LOW (ref 3.8–5.2)
RBC # BLD: 3.45 M/UL — LOW (ref 3.8–5.2)
RBC # FLD: 13.2 % — SIGNIFICANT CHANGE UP (ref 10.3–14.5)
RBC # FLD: 13.2 % — SIGNIFICANT CHANGE UP (ref 10.3–14.5)
RH IG SCN BLD-IMP: POSITIVE — SIGNIFICANT CHANGE UP
RH IG SCN BLD-IMP: POSITIVE — SIGNIFICANT CHANGE UP
SODIUM SERPL-SCNC: 131 MMOL/L — LOW (ref 135–145)
SODIUM SERPL-SCNC: 131 MMOL/L — LOW (ref 135–145)
WBC # BLD: 5.09 K/UL — SIGNIFICANT CHANGE UP (ref 3.8–10.5)
WBC # BLD: 5.09 K/UL — SIGNIFICANT CHANGE UP (ref 3.8–10.5)
WBC # FLD AUTO: 5.09 K/UL — SIGNIFICANT CHANGE UP (ref 3.8–10.5)
WBC # FLD AUTO: 5.09 K/UL — SIGNIFICANT CHANGE UP (ref 3.8–10.5)

## 2023-10-31 PROCEDURE — 71045 X-RAY EXAM CHEST 1 VIEW: CPT | Mod: 26

## 2023-10-31 PROCEDURE — 73090 X-RAY EXAM OF FOREARM: CPT | Mod: 26,LT,77

## 2023-10-31 PROCEDURE — 99223 1ST HOSP IP/OBS HIGH 75: CPT

## 2023-10-31 PROCEDURE — 99285 EMERGENCY DEPT VISIT HI MDM: CPT | Mod: FS

## 2023-10-31 PROCEDURE — 73060 X-RAY EXAM OF HUMERUS: CPT | Mod: 26,RT

## 2023-10-31 PROCEDURE — 73080 X-RAY EXAM OF ELBOW: CPT | Mod: 26,RT

## 2023-10-31 PROCEDURE — 73070 X-RAY EXAM OF ELBOW: CPT | Mod: 26,RT

## 2023-10-31 PROCEDURE — 73090 X-RAY EXAM OF FOREARM: CPT | Mod: 26,LT

## 2023-10-31 RX ORDER — LEVOTHYROXINE SODIUM 125 MCG
100 TABLET ORAL DAILY
Refills: 0 | Status: DISCONTINUED | OUTPATIENT
Start: 2023-10-31 | End: 2023-11-01

## 2023-10-31 RX ORDER — ATORVASTATIN CALCIUM 80 MG/1
80 TABLET, FILM COATED ORAL AT BEDTIME
Refills: 0 | Status: DISCONTINUED | OUTPATIENT
Start: 2023-10-31 | End: 2023-11-01

## 2023-10-31 RX ORDER — OXYCODONE HYDROCHLORIDE 5 MG/1
5 TABLET ORAL EVERY 4 HOURS
Refills: 0 | Status: DISCONTINUED | OUTPATIENT
Start: 2023-10-31 | End: 2023-11-01

## 2023-10-31 RX ORDER — PANTOPRAZOLE SODIUM 20 MG/1
40 TABLET, DELAYED RELEASE ORAL
Refills: 0 | Status: DISCONTINUED | OUTPATIENT
Start: 2023-10-31 | End: 2023-11-01

## 2023-10-31 RX ORDER — ARIPIPRAZOLE 15 MG/1
5 TABLET ORAL AT BEDTIME
Refills: 0 | Status: DISCONTINUED | OUTPATIENT
Start: 2023-10-31 | End: 2023-11-01

## 2023-10-31 RX ORDER — BUPROPION HYDROCHLORIDE 150 MG/1
75 TABLET, EXTENDED RELEASE ORAL DAILY
Refills: 0 | Status: DISCONTINUED | OUTPATIENT
Start: 2023-10-31 | End: 2023-11-01

## 2023-10-31 RX ORDER — ALBUTEROL 90 UG/1
2 AEROSOL, METERED ORAL EVERY 6 HOURS
Refills: 0 | Status: DISCONTINUED | OUTPATIENT
Start: 2023-10-31 | End: 2023-11-01

## 2023-10-31 RX ORDER — BUPROPION HYDROCHLORIDE 150 MG/1
75 TABLET, EXTENDED RELEASE ORAL DAILY
Refills: 0 | Status: DISCONTINUED | OUTPATIENT
Start: 2023-10-31 | End: 2023-10-31

## 2023-10-31 RX ORDER — ASPIRIN/CALCIUM CARB/MAGNESIUM 324 MG
81 TABLET ORAL DAILY
Refills: 0 | Status: DISCONTINUED | OUTPATIENT
Start: 2023-10-31 | End: 2023-11-01

## 2023-10-31 RX ORDER — CLONAZEPAM 1 MG
1 TABLET ORAL THREE TIMES A DAY
Refills: 0 | Status: DISCONTINUED | OUTPATIENT
Start: 2023-10-31 | End: 2023-11-01

## 2023-10-31 RX ORDER — LACTOBACILLUS ACIDOPHILUS 100MM CELL
1 CAPSULE ORAL DAILY
Refills: 0 | Status: DISCONTINUED | OUTPATIENT
Start: 2023-10-31 | End: 2023-11-01

## 2023-10-31 RX ORDER — ONDANSETRON 8 MG/1
4 TABLET, FILM COATED ORAL EVERY 8 HOURS
Refills: 0 | Status: DISCONTINUED | OUTPATIENT
Start: 2023-10-31 | End: 2023-11-01

## 2023-10-31 RX ORDER — HEPARIN SODIUM 5000 [USP'U]/ML
5000 INJECTION INTRAVENOUS; SUBCUTANEOUS EVERY 12 HOURS
Refills: 0 | Status: DISCONTINUED | OUTPATIENT
Start: 2023-10-31 | End: 2023-11-01

## 2023-10-31 RX ORDER — OXYCODONE HYDROCHLORIDE 5 MG/1
10 TABLET ORAL EVERY 4 HOURS
Refills: 0 | Status: DISCONTINUED | OUTPATIENT
Start: 2023-10-31 | End: 2023-11-01

## 2023-10-31 RX ORDER — MONTELUKAST 4 MG/1
10 TABLET, CHEWABLE ORAL DAILY
Refills: 0 | Status: DISCONTINUED | OUTPATIENT
Start: 2023-10-31 | End: 2023-11-01

## 2023-10-31 RX ORDER — ACETAMINOPHEN 500 MG
650 TABLET ORAL EVERY 6 HOURS
Refills: 0 | Status: DISCONTINUED | OUTPATIENT
Start: 2023-10-31 | End: 2023-11-01

## 2023-10-31 RX ORDER — LANOLIN ALCOHOL/MO/W.PET/CERES
3 CREAM (GRAM) TOPICAL AT BEDTIME
Refills: 0 | Status: DISCONTINUED | OUTPATIENT
Start: 2023-10-31 | End: 2023-11-01

## 2023-10-31 RX ORDER — METHYLPHENIDATE HCL 5 MG
27 TABLET ORAL DAILY
Refills: 0 | Status: DISCONTINUED | OUTPATIENT
Start: 2023-10-31 | End: 2023-11-01

## 2023-10-31 RX ORDER — GABAPENTIN 400 MG/1
300 CAPSULE ORAL
Refills: 0 | Status: DISCONTINUED | OUTPATIENT
Start: 2023-10-31 | End: 2023-11-01

## 2023-10-31 RX ADMIN — ARIPIPRAZOLE 5 MILLIGRAM(S): 15 TABLET ORAL at 22:45

## 2023-10-31 RX ADMIN — Medication 1 MILLIGRAM(S): at 22:45

## 2023-10-31 RX ADMIN — OXYCODONE HYDROCHLORIDE 5 MILLIGRAM(S): 5 TABLET ORAL at 22:44

## 2023-10-31 RX ADMIN — ATORVASTATIN CALCIUM 80 MILLIGRAM(S): 80 TABLET, FILM COATED ORAL at 22:46

## 2023-10-31 NOTE — ED PROVIDER NOTE - OBJECTIVE STATEMENT
71 yo female PMhx GERD, CVA, laryngeal CA s/p trach, hypothyroidism, breast CA presents to ED complaining of fatigue in setting of low sodium on outpatient preop labs.  Patient had mechanical fall on Sunday, was seen in outside hospital and diagnosed with reported right elbow fracture, scheduled to have surgery with outpatient orthopedics from [facility], sent into ED today by PMD Dr. Mora due to low sodium on outpatient labs. Pt states she has had low sodium in past once before. Does endorse that she drinks ~3L water per day. Denies cp, sob, dizziness, lightheadedness, numbness/tingling, syncope, difficulty urinating, n/v/d.

## 2023-10-31 NOTE — H&P ADULT - PROBLEM SELECTOR PLAN 2
S/p mechanical fall 2days ago  - XR Rt arm & elbow: Acute intra-articular fracture through the olecranon process of the ulna  - Prn pain control   - Ortho consulted for eventual ORIF once labs more stable S/p mechanical fall 2days ago  - XR Rt arm & elbow: Acute intra-articular fracture through the olecranon process of the ulna  - Prn pain control   - Ortho consulted for eventual ORIF once labs improve     - Denies coagulopathy  - No prior reaction to anesthesia  - Non smoker   -  Able to perform > 4mets  - RCRI: Class II (Hx/o cva)  - EKG: NSR   - Patient is Mod to high risk for mod risk procedure and is medically optimized   pending improvement in Na level S/p mechanical fall 2days ago  - XR Rt arm & elbow: Acute intra-articular fracture through the olecranon process of the ulna  - Prn pain control   - Ortho consulted for eventual surgery once labs improve     - Denies coagulopathy  - No prior reaction to anesthesia  - Non smoker   -  Able to perform > 4mets  - RCRI: Class II (Hx/o cva)  - EKG: NSR   - Patient is Mod to high risk for mod risk procedure and is medically optimized   pending improvement in electrolyte distrubance

## 2023-10-31 NOTE — ED PROVIDER NOTE - ATTENDING APP SHARED VISIT CONTRIBUTION OF CARE
MD Alvarez:  patient seen and evaluated with the PA.  I was present for key portions of the History & Physical, and I agree with the Impression & Plan.    MD Alvarez:  73 yo F, sent to the ED by PMD, Dr. Zane Mora.   Patient has 2 problems: 1 hyponatremia, likely delusional, patient drinks 2 to 3 L of free water a day.  Hyponatremia was identified on preop work-up, she was supposed to have cataract surgery 1 week ago but her sodium was 125.  Surgery was canceled and she was fluid restricted for the last week, repeat sodium done in clinic showed a modest increase of the sodium from 1 .  In the interim she has sustained a mechanical fall with an olecranon fracture that will require ORIF, however she will not be able to undergo surgery until this electrolyte abnormality is thoroughly corrected.  Per my conversation with Dr. Mora, she was slated to have surgery at an outside facility, but was refused on account of her hyponatremia.  He feels that she will be best served being admitted to internal medicine with an orthopedic surgery consult for ultimate ORIF of her olecranon.    Medical decision making:  Hyponatremia, likely delusional on account of excess free water intake.  Sodium level from today is pending.    Anticipate admission to internal medicine, full-time hospitalist, with orthopedic surgery consult for scheduled in-house ORIF of the olecranon fracture. MD Alvarez:  patient seen and evaluated with the PA.  I was present for key portions of the History & Physical, and I agree with the Impression & Plan.    MD Alvarez:  71 yo F, sent to the ED by PMD, Dr. Zane Mora.   Patient has 2 problems: 1 hyponatremia, likely delusional, patient drinks 2 to 3 L of free water a day.  Hyponatremia was identified on preop work-up, she was supposed to have cataract surgery 1 week ago but her sodium was 125.  Surgery was canceled and she was fluid restricted for the last week, repeat sodium done in clinic showed a modest increase of the sodium from 1 .  In the interim she has sustained a mechanical fall with an olecranon fracture that will require ORIF, however she will not be able to undergo surgery until this electrolyte abnormality is thoroughly corrected.  Per my conversation with Dr. oMra, she was slated to have surgery at an outside facility, but was refused on account of her hyponatremia.  He feels that she will be best served being admitted to internal medicine with an orthopedic surgery consult for ultimate ORIF of her olecranon.    VS: wnl  Gen: Well appearing adult F, in NAD  Head: NC/AT  Neck: trachea midline  Resp:  No distress  Ext: RUE immobilized in sugar-tong splint  Neuro:  A&Ox4 appears non focal  Skin:  Warm and dry as visualized  Psych:  Normal affect and mood    Medical decision making:  Hyponatremia, likely delusional on account of excess free water intake.  Sodium level from today is pending.    Anticipate admission to internal medicine, full-time hospitalist, with orthopedic surgery consult for scheduled in-house ORIF of the olecranon fracture.

## 2023-10-31 NOTE — ED ADULT NURSE NOTE - OBJECTIVE STATEMENT
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

## 2023-10-31 NOTE — H&P ADULT - PROBLEM SELECTOR PLAN 1
HypoNa on O/P labs-- Na 125  - Afebrile, VSS, clinically nontoxic   - EKG ordered   - Urine studies   - Fluid restriction    - Trend labs HypoNa on O/P labs-- Na 125  - Endorses new BP med (HTZ)  was added to her regimen apx 2wk ago.  Was previously only taking Olmesartan.   Suspect addition of HTZ likely contributed to electrolyte disturbance   - EKG NSR    - Urine studies ordered  - Fluid restriction    - Trend labs  - BP stable off meds, will hold for now. If BP becomes uncontrolled can start Olmesartan 20mg

## 2023-10-31 NOTE — ED ADULT NURSE NOTE - NSFALLRISKINTERV_ED_ALL_ED

## 2023-10-31 NOTE — ED ADULT NURSE NOTE - NS ED NURSE REPORT GIVEN TO FT
Normal vision: sees adequately in most situations; can see medication labels, newsprint kaushik toro

## 2023-10-31 NOTE — ED CLERICAL - NS ED CLERK NOTE PRE-ARRIVAL INFORMATION; ADDITIONAL PRE-ARRIVAL INFORMATION
CC/Reason For referral: hyponatremia and fx elbow  Preferred Consultant(if applicable): ortho  Who admits for you (if needed): hospitalist  Do you have documents you would like to fax over? sent with patient  Would you still like to speak to an ED attending? yes

## 2023-10-31 NOTE — H&P ADULT - PROBLEM SELECTOR PLAN 6
- DVT ppx: Heparin SQ   - GI ppx: PPI   - Diet: DASH >> NPO MN for possible procedure   - PT consult

## 2023-10-31 NOTE — H&P ADULT - PROBLEM SELECTOR PLAN 3
- c/w Levothyroxine   - Check TSH - BP stable off meds, will hold for now  - If BP becomes uncontrolled can start Olmesartan 20mg

## 2023-10-31 NOTE — ED PROVIDER NOTE - CLINICAL SUMMARY MEDICAL DECISION MAKING FREE TEXT BOX
Medical decision making:  Hyponatremia, likely delusional on account of excess free water intake.  Sodium level from today is pending.    Anticipate admission to internal medicine, full-time hospitalist, with orthopedic surgery consult for scheduled in-house ORIF of the olecranon fracture.

## 2023-10-31 NOTE — H&P ADULT - NSHPPHYSICALEXAM_GEN_ALL_CORE
T(C): 36.4 (10-31-23 @ 20:10), Max: 37 (10-31-23 @ 15:08)  HR: 79 (10-31-23 @ 20:10) (79 - 95)  BP: 122/75 (10-31-23 @ 20:10) (116/54 - 122/75)  RR: 18 (10-31-23 @ 20:10) (17 - 18)  SpO2: 97% (10-31-23 @ 20:10) (97% - 98%)    CONSTITUTIONAL: Well groomed, no apparent distress  EYES: PERRLA and symmetric, EOMI  ENMT: MMM. Normal dentition, trach collar   RESP: No respiratory distress, no use of accessory muscles; CTA b/l  CV: +S1S2, RRR, trace LE edema b/l   GI: Soft, NTND, no RGR  MSK: Normal gait; No digital clubbing or cyanosis; RUE splint  SKIN: No rashes or ulcers noted  NEURO: CN II-XII grossly intact; normal reflexes, sensation intact throughout   PSYCH: Appropriate insight/judgment; A+O x 3

## 2023-10-31 NOTE — H&P ADULT - ASSESSMENT
72y F pmh GERD, CVA, laryngeal CA s/p trach, hypothyroidism, breast CA presents to ED c/o fatigue iso low sodium c/b mechanical fall pending surgical intervention being admitted for further management

## 2023-10-31 NOTE — H&P ADULT - NSHPLABSRESULTS_GEN_ALL_CORE
- - - - - - - - - - - - - - - - - - - - - - - - - - - - - - - - - - - - - - - - - - - - - - - - - - - -       EKG personally reviewed:      Images personally reviewed:     < from: Xray Chest 1 View AP/PA (10.31.23 @ 20:16) >  IMPRESSION: Clear lungs.      < from: Xray Humerus, Right (10.31.23 @ 20:20) >  IMPRESSION:  Acute intra-articular fracture through the olecranon process of the ulna.   The distal fragment containing the majority of the ulna is displaced   anteriorly by approximately 1.5 cm.  Associated elbow joint effusion.  There is splint material spanning from the mid humerus to the hand.  Mild arthrosis of the basilar joint, STT joint, radiocarpal joint, and of   the acromioclavicular joint.  Surgical clips noted in the right axilla. 10-31    131<L>  |  98  |  8   ----------------------------<  96  5.1   |  22  |  0.53    Ca    9.5      31 Oct 2023 20:27    TPro  6.8  /  Alb  3.9  /  TBili  0.3  /  DBili  x   /  AST  48<H>  /  ALT  45  /  AlkPhos  91  10-31        - - - - - - - - - - - - - - - - - - - - - - - - - - - - - - - - - - - - - - - - - - - - - - - - - - - -       EKG personally reviewed:   NSR 70 bpm        Images personally reviewed:     < from: Xray Chest 1 View AP/PA (10.31.23 @ 20:16) >  IMPRESSION: Clear lungs.    < from: Xray Humerus, Right (10.31.23 @ 20:20) >  IMPRESSION:  Acute intra-articular fracture through the olecranon process of the ulna.   The distal fragment containing the majority of the ulna is displaced   anteriorly by approximately 1.5 cm.  Associated elbow joint effusion.  There is splint material spanning from the mid humerus to the hand.  Mild arthrosis of the basilar joint, STT joint, radiocarpal joint, and of   the acromioclavicular joint.  Surgical clips noted in the right axilla.

## 2023-10-31 NOTE — ED PROVIDER NOTE - CARE PLAN
1 Principal Discharge DX:	Hyponatremia  Secondary Diagnosis:	Closed olecranon fracture, right, initial encounter

## 2023-10-31 NOTE — H&P ADULT - HISTORY OF PRESENT ILLNESS
72y F pmh GERD, CVA, laryngeal CA s/p trach, hypothyroidism, breast CA presents to ED c/o fatigue iso low sodium on outpatient preop labs.  Patient had mechanical fall on Sunday, was eval at outside hospital, found to have right elbow fracture, and is scheduled to have surgery with outpatient orthopedics from [facility]. Pre-op labs obtained by PMD (Dr Mora) but pt noted to be hyponatremic  so was sent to ED by PMD,  Pt reports hx of  hypoNa 1x in the past and endorses that she drinks ~3L water per day.       ROS: Denies CP, SOB, palpitation, N/V/D, fever, cough, chills, dizziness, abm pain, recent travel, sick contact, change in bowel and urinary habits     A 10-system ROS was performed and is negative except as noted above and/or in the HPI.    ED: Labs and imaging obtained. Ortho also was consulted by ED  72y F pmh GERD, CVA, laryngeal CA s/p trach, hypothyroidism, breast CA presents to ED c/o fatigue iso low sodium on outpatient preop labs.  Patient had mechanical fall on Sunday, was eval at outside hospital, found to have right elbow fracture, and is scheduled to have surgery with outpatient orthopedics from [facility]. Pre-op labs obtained by PMD (Dr Mora) but pt noted to be hyponatremic  so was sent to ED by PMD,  Pt reports hx of  hypoNa 1x in the past and endorses that she drinks ~3L water per day.  Says she started a new BP med 2 wk ago.       ROS: Denies CP, SOB, palpitation, N/V/D, fever, cough, chills, dizziness, abm pain, recent travel, sick contact, change in bowel and urinary habits     A 10-system ROS was performed and is negative except as noted above and/or in the HPI.    ED: Labs and imaging obtained. Ortho also was consulted by ED

## 2023-10-31 NOTE — PATIENT PROFILE ADULT - FUNCTIONAL ASSESSMENT - BASIC MOBILITY 6.
15
3-calculated by average/Not able to assess (calculate score using [facility] averaging method)

## 2023-10-31 NOTE — ED PROVIDER NOTE - ENMT, MLM
Airway patent, Nasal mucosa clear. Mouth with normal mucosa. Throat has no vesicles, no oropharyngeal exudates and uvula is midline. +trach site in place

## 2023-10-31 NOTE — ED PROVIDER NOTE - WR ORDER ID 1
Pt reports chest pain, nonstop since thanksgiving. Pt has no been able to see PCP for this discomfort. [id number]

## 2023-10-31 NOTE — PATIENT PROFILE ADULT - FALL HARM RISK - HARM RISK INTERVENTIONS
Assistance with ambulation/Assistance OOB with selected safe patient handling equipment/Communicate Risk of Fall with Harm to all staff/Discuss with provider need for PT consult/Monitor gait and stability/Reinforce activity limits and safety measures with patient and family/Tailored Fall Risk Interventions/Visual Cue: Yellow wristband and red socks/Bed in lowest position, wheels locked, appropriate side rails in place/Call bell, personal items and telephone in reach/Instruct patient to call for assistance before getting out of bed or chair/Non-slip footwear when patient is out of bed/[place] to call system/Physically safe environment - no spills, clutter or unnecessary equipment/Purposeful Proactive Rounding/Room/bathroom lighting operational, light cord in reach

## 2023-10-31 NOTE — ED ADULT TRIAGE NOTE - CHIEF COMPLAINT QUOTE
found to be hyponatremic on pre-op blood work  preop for right elbow surgery, surgery supposed to be tomorrow

## 2023-11-01 ENCOUNTER — TRANSCRIPTION ENCOUNTER (OUTPATIENT)
Age: 73
End: 2023-11-01

## 2023-11-01 LAB
ANION GAP SERPL CALC-SCNC: 11 MMOL/L — SIGNIFICANT CHANGE UP (ref 5–17)
ANION GAP SERPL CALC-SCNC: 11 MMOL/L — SIGNIFICANT CHANGE UP (ref 5–17)
APPEARANCE UR: CLEAR — SIGNIFICANT CHANGE UP
APPEARANCE UR: CLEAR — SIGNIFICANT CHANGE UP
APTT BLD: 32.1 SEC — SIGNIFICANT CHANGE UP (ref 24.5–35.6)
APTT BLD: 32.1 SEC — SIGNIFICANT CHANGE UP (ref 24.5–35.6)
BACTERIA # UR AUTO: NEGATIVE /HPF — SIGNIFICANT CHANGE UP
BACTERIA # UR AUTO: NEGATIVE /HPF — SIGNIFICANT CHANGE UP
BILIRUB UR-MCNC: NEGATIVE — SIGNIFICANT CHANGE UP
BILIRUB UR-MCNC: NEGATIVE — SIGNIFICANT CHANGE UP
BLD GP AB SCN SERPL QL: NEGATIVE — SIGNIFICANT CHANGE UP
BLD GP AB SCN SERPL QL: NEGATIVE — SIGNIFICANT CHANGE UP
BUN SERPL-MCNC: 9 MG/DL — SIGNIFICANT CHANGE UP (ref 7–23)
BUN SERPL-MCNC: 9 MG/DL — SIGNIFICANT CHANGE UP (ref 7–23)
CALCIUM SERPL-MCNC: 9.6 MG/DL — SIGNIFICANT CHANGE UP (ref 8.4–10.5)
CALCIUM SERPL-MCNC: 9.6 MG/DL — SIGNIFICANT CHANGE UP (ref 8.4–10.5)
CHLORIDE SERPL-SCNC: 99 MMOL/L — SIGNIFICANT CHANGE UP (ref 96–108)
CHLORIDE SERPL-SCNC: 99 MMOL/L — SIGNIFICANT CHANGE UP (ref 96–108)
CO2 SERPL-SCNC: 25 MMOL/L — SIGNIFICANT CHANGE UP (ref 22–31)
CO2 SERPL-SCNC: 25 MMOL/L — SIGNIFICANT CHANGE UP (ref 22–31)
COLOR SPEC: YELLOW — SIGNIFICANT CHANGE UP
COLOR SPEC: YELLOW — SIGNIFICANT CHANGE UP
CREAT ?TM UR-MCNC: 27 MG/DL — SIGNIFICANT CHANGE UP
CREAT ?TM UR-MCNC: 27 MG/DL — SIGNIFICANT CHANGE UP
CREAT SERPL-MCNC: 0.6 MG/DL — SIGNIFICANT CHANGE UP (ref 0.5–1.3)
CREAT SERPL-MCNC: 0.6 MG/DL — SIGNIFICANT CHANGE UP (ref 0.5–1.3)
DIFF PNL FLD: NEGATIVE — SIGNIFICANT CHANGE UP
DIFF PNL FLD: NEGATIVE — SIGNIFICANT CHANGE UP
EGFR: 96 ML/MIN/1.73M2 — SIGNIFICANT CHANGE UP
EGFR: 96 ML/MIN/1.73M2 — SIGNIFICANT CHANGE UP
GLUCOSE SERPL-MCNC: 100 MG/DL — HIGH (ref 70–99)
GLUCOSE SERPL-MCNC: 100 MG/DL — HIGH (ref 70–99)
GLUCOSE UR QL: NEGATIVE MG/DL — SIGNIFICANT CHANGE UP
GLUCOSE UR QL: NEGATIVE MG/DL — SIGNIFICANT CHANGE UP
HCT VFR BLD CALC: 32 % — LOW (ref 34.5–45)
HCT VFR BLD CALC: 32 % — LOW (ref 34.5–45)
HGB BLD-MCNC: 11 G/DL — LOW (ref 11.5–15.5)
HGB BLD-MCNC: 11 G/DL — LOW (ref 11.5–15.5)
INR BLD: 0.96 RATIO — SIGNIFICANT CHANGE UP (ref 0.85–1.18)
INR BLD: 0.96 RATIO — SIGNIFICANT CHANGE UP (ref 0.85–1.18)
KETONES UR-MCNC: NEGATIVE MG/DL — SIGNIFICANT CHANGE UP
KETONES UR-MCNC: NEGATIVE MG/DL — SIGNIFICANT CHANGE UP
LEUKOCYTE ESTERASE UR-ACNC: ABNORMAL
LEUKOCYTE ESTERASE UR-ACNC: ABNORMAL
MCHC RBC-ENTMCNC: 32.5 PG — SIGNIFICANT CHANGE UP (ref 27–34)
MCHC RBC-ENTMCNC: 32.5 PG — SIGNIFICANT CHANGE UP (ref 27–34)
MCHC RBC-ENTMCNC: 34.4 GM/DL — SIGNIFICANT CHANGE UP (ref 32–36)
MCHC RBC-ENTMCNC: 34.4 GM/DL — SIGNIFICANT CHANGE UP (ref 32–36)
MCV RBC AUTO: 94.7 FL — SIGNIFICANT CHANGE UP (ref 80–100)
MCV RBC AUTO: 94.7 FL — SIGNIFICANT CHANGE UP (ref 80–100)
NITRITE UR-MCNC: NEGATIVE — SIGNIFICANT CHANGE UP
NITRITE UR-MCNC: NEGATIVE — SIGNIFICANT CHANGE UP
NRBC # BLD: 0 /100 WBCS — SIGNIFICANT CHANGE UP (ref 0–0)
NRBC # BLD: 0 /100 WBCS — SIGNIFICANT CHANGE UP (ref 0–0)
OSMOLALITY UR: 119 MOS/KG — LOW (ref 300–900)
OSMOLALITY UR: 119 MOS/KG — LOW (ref 300–900)
PH UR: 7 — SIGNIFICANT CHANGE UP (ref 5–8)
PH UR: 7 — SIGNIFICANT CHANGE UP (ref 5–8)
PLATELET # BLD AUTO: 176 K/UL — SIGNIFICANT CHANGE UP (ref 150–400)
PLATELET # BLD AUTO: 176 K/UL — SIGNIFICANT CHANGE UP (ref 150–400)
POTASSIUM SERPL-MCNC: 4.6 MMOL/L — SIGNIFICANT CHANGE UP (ref 3.5–5.3)
POTASSIUM SERPL-MCNC: 4.6 MMOL/L — SIGNIFICANT CHANGE UP (ref 3.5–5.3)
POTASSIUM SERPL-SCNC: 4.6 MMOL/L — SIGNIFICANT CHANGE UP (ref 3.5–5.3)
POTASSIUM SERPL-SCNC: 4.6 MMOL/L — SIGNIFICANT CHANGE UP (ref 3.5–5.3)
PROT UR-MCNC: NEGATIVE MG/DL — SIGNIFICANT CHANGE UP
PROT UR-MCNC: NEGATIVE MG/DL — SIGNIFICANT CHANGE UP
PROTHROM AB SERPL-ACNC: 10.6 SEC — SIGNIFICANT CHANGE UP (ref 9.5–13)
PROTHROM AB SERPL-ACNC: 10.6 SEC — SIGNIFICANT CHANGE UP (ref 9.5–13)
RBC # BLD: 3.38 M/UL — LOW (ref 3.8–5.2)
RBC # BLD: 3.38 M/UL — LOW (ref 3.8–5.2)
RBC # FLD: 13.2 % — SIGNIFICANT CHANGE UP (ref 10.3–14.5)
RBC # FLD: 13.2 % — SIGNIFICANT CHANGE UP (ref 10.3–14.5)
RBC CASTS # UR COMP ASSIST: 2 /HPF — SIGNIFICANT CHANGE UP (ref 0–4)
RBC CASTS # UR COMP ASSIST: 2 /HPF — SIGNIFICANT CHANGE UP (ref 0–4)
REVIEW: SIGNIFICANT CHANGE UP
REVIEW: SIGNIFICANT CHANGE UP
RH IG SCN BLD-IMP: POSITIVE — SIGNIFICANT CHANGE UP
RH IG SCN BLD-IMP: POSITIVE — SIGNIFICANT CHANGE UP
SODIUM SERPL-SCNC: 135 MMOL/L — SIGNIFICANT CHANGE UP (ref 135–145)
SODIUM SERPL-SCNC: 135 MMOL/L — SIGNIFICANT CHANGE UP (ref 135–145)
SODIUM UR-SCNC: 8 MMOL/L — SIGNIFICANT CHANGE UP
SODIUM UR-SCNC: 8 MMOL/L — SIGNIFICANT CHANGE UP
SP GR SPEC: 1 — SIGNIFICANT CHANGE UP (ref 1–1.03)
SP GR SPEC: 1 — SIGNIFICANT CHANGE UP (ref 1–1.03)
SQUAMOUS # UR AUTO: 1 /HPF — SIGNIFICANT CHANGE UP (ref 0–5)
SQUAMOUS # UR AUTO: 1 /HPF — SIGNIFICANT CHANGE UP (ref 0–5)
T4 FREE SERPL-MCNC: 1.5 NG/DL — SIGNIFICANT CHANGE UP (ref 0.9–1.8)
T4 FREE SERPL-MCNC: 1.5 NG/DL — SIGNIFICANT CHANGE UP (ref 0.9–1.8)
T4 FREE+ TSH PNL SERPL: 4.34 UIU/ML — HIGH (ref 0.27–4.2)
T4 FREE+ TSH PNL SERPL: 4.34 UIU/ML — HIGH (ref 0.27–4.2)
UROBILINOGEN FLD QL: 1 MG/DL — SIGNIFICANT CHANGE UP (ref 0.2–1)
UROBILINOGEN FLD QL: 1 MG/DL — SIGNIFICANT CHANGE UP (ref 0.2–1)
WBC # BLD: 5.26 K/UL — SIGNIFICANT CHANGE UP (ref 3.8–10.5)
WBC # BLD: 5.26 K/UL — SIGNIFICANT CHANGE UP (ref 3.8–10.5)
WBC # FLD AUTO: 5.26 K/UL — SIGNIFICANT CHANGE UP (ref 3.8–10.5)
WBC # FLD AUTO: 5.26 K/UL — SIGNIFICANT CHANGE UP (ref 3.8–10.5)
WBC UR QL: 1 /HPF — SIGNIFICANT CHANGE UP (ref 0–5)
WBC UR QL: 1 /HPF — SIGNIFICANT CHANGE UP (ref 0–5)

## 2023-11-01 PROCEDURE — 24685 OPTX ULNAR FX PROX END W/FIX: CPT | Mod: RT

## 2023-11-01 PROCEDURE — 99232 SBSQ HOSP IP/OBS MODERATE 35: CPT | Mod: GC

## 2023-11-01 DEVICE — SUT WIRE 0.04" X 18G: Type: IMPLANTABLE DEVICE | Site: RIGHT | Status: FUNCTIONAL

## 2023-11-01 DEVICE — K-WIRE SYNTHES (THREADED) TROCAR POINT 1.6MM X 150MM: Type: IMPLANTABLE DEVICE | Site: RIGHT | Status: FUNCTIONAL

## 2023-11-01 RX ORDER — METHYLPHENIDATE HCL 5 MG
27 TABLET ORAL DAILY
Refills: 0 | Status: DISCONTINUED | OUTPATIENT
Start: 2023-11-01 | End: 2023-11-01

## 2023-11-01 RX ORDER — LACTOBACILLUS ACIDOPHILUS 100MM CELL
1 CAPSULE ORAL DAILY
Refills: 0 | Status: DISCONTINUED | OUTPATIENT
Start: 2023-11-01 | End: 2023-11-03

## 2023-11-01 RX ORDER — ONDANSETRON 8 MG/1
4 TABLET, FILM COATED ORAL EVERY 8 HOURS
Refills: 0 | Status: DISCONTINUED | OUTPATIENT
Start: 2023-11-01 | End: 2023-11-03

## 2023-11-01 RX ORDER — ALBUTEROL 90 UG/1
2 AEROSOL, METERED ORAL EVERY 6 HOURS
Refills: 0 | Status: DISCONTINUED | OUTPATIENT
Start: 2023-11-01 | End: 2023-11-03

## 2023-11-01 RX ORDER — GABAPENTIN 400 MG/1
300 CAPSULE ORAL
Refills: 0 | Status: DISCONTINUED | OUTPATIENT
Start: 2023-11-01 | End: 2023-11-03

## 2023-11-01 RX ORDER — ATORVASTATIN CALCIUM 80 MG/1
80 TABLET, FILM COATED ORAL AT BEDTIME
Refills: 0 | Status: DISCONTINUED | OUTPATIENT
Start: 2023-11-01 | End: 2023-11-03

## 2023-11-01 RX ORDER — LEVOTHYROXINE SODIUM 125 MCG
100 TABLET ORAL DAILY
Refills: 0 | Status: DISCONTINUED | OUTPATIENT
Start: 2023-11-01 | End: 2023-11-03

## 2023-11-01 RX ORDER — ARIPIPRAZOLE 15 MG/1
5 TABLET ORAL AT BEDTIME
Refills: 0 | Status: DISCONTINUED | OUTPATIENT
Start: 2023-11-01 | End: 2023-11-03

## 2023-11-01 RX ORDER — OXYCODONE HYDROCHLORIDE 5 MG/1
5 TABLET ORAL EVERY 4 HOURS
Refills: 0 | Status: DISCONTINUED | OUTPATIENT
Start: 2023-11-01 | End: 2023-11-03

## 2023-11-01 RX ORDER — ASPIRIN/CALCIUM CARB/MAGNESIUM 324 MG
81 TABLET ORAL DAILY
Refills: 0 | Status: DISCONTINUED | OUTPATIENT
Start: 2023-11-01 | End: 2023-11-03

## 2023-11-01 RX ORDER — LANOLIN ALCOHOL/MO/W.PET/CERES
3 CREAM (GRAM) TOPICAL AT BEDTIME
Refills: 0 | Status: DISCONTINUED | OUTPATIENT
Start: 2023-11-01 | End: 2023-11-03

## 2023-11-01 RX ORDER — PANTOPRAZOLE SODIUM 20 MG/1
40 TABLET, DELAYED RELEASE ORAL
Refills: 0 | Status: DISCONTINUED | OUTPATIENT
Start: 2023-11-01 | End: 2023-11-03

## 2023-11-01 RX ORDER — CLONAZEPAM 1 MG
1 TABLET ORAL THREE TIMES A DAY
Refills: 0 | Status: DISCONTINUED | OUTPATIENT
Start: 2023-11-01 | End: 2023-11-03

## 2023-11-01 RX ORDER — CHLORHEXIDINE GLUCONATE 213 G/1000ML
1 SOLUTION TOPICAL DAILY
Refills: 0 | Status: DISCONTINUED | OUTPATIENT
Start: 2023-11-01 | End: 2023-11-03

## 2023-11-01 RX ORDER — ONDANSETRON 8 MG/1
4 TABLET, FILM COATED ORAL ONCE
Refills: 0 | Status: DISCONTINUED | OUTPATIENT
Start: 2023-11-01 | End: 2023-11-01

## 2023-11-01 RX ORDER — MONTELUKAST 4 MG/1
10 TABLET, CHEWABLE ORAL DAILY
Refills: 0 | Status: DISCONTINUED | OUTPATIENT
Start: 2023-11-01 | End: 2023-11-03

## 2023-11-01 RX ORDER — ACETAMINOPHEN 500 MG
650 TABLET ORAL EVERY 6 HOURS
Refills: 0 | Status: DISCONTINUED | OUTPATIENT
Start: 2023-11-01 | End: 2023-11-03

## 2023-11-01 RX ORDER — HYDROMORPHONE HYDROCHLORIDE 2 MG/ML
0.25 INJECTION INTRAMUSCULAR; INTRAVENOUS; SUBCUTANEOUS
Refills: 0 | Status: DISCONTINUED | OUTPATIENT
Start: 2023-11-01 | End: 2023-11-01

## 2023-11-01 RX ORDER — OXYCODONE HYDROCHLORIDE 5 MG/1
10 TABLET ORAL EVERY 4 HOURS
Refills: 0 | Status: DISCONTINUED | OUTPATIENT
Start: 2023-11-01 | End: 2023-11-03

## 2023-11-01 RX ADMIN — PANTOPRAZOLE SODIUM 40 MILLIGRAM(S): 20 TABLET, DELAYED RELEASE ORAL at 16:59

## 2023-11-01 RX ADMIN — Medication 1 MILLIGRAM(S): at 17:00

## 2023-11-01 RX ADMIN — Medication 81 MILLIGRAM(S): at 16:59

## 2023-11-01 RX ADMIN — MONTELUKAST 10 MILLIGRAM(S): 4 TABLET, CHEWABLE ORAL at 21:59

## 2023-11-01 RX ADMIN — CHLORHEXIDINE GLUCONATE 1 APPLICATION(S): 213 SOLUTION TOPICAL at 18:00

## 2023-11-01 RX ADMIN — OXYCODONE HYDROCHLORIDE 10 MILLIGRAM(S): 5 TABLET ORAL at 17:00

## 2023-11-01 RX ADMIN — GABAPENTIN 300 MILLIGRAM(S): 400 CAPSULE ORAL at 21:59

## 2023-11-01 RX ADMIN — Medication 1 TABLET(S): at 16:58

## 2023-11-01 RX ADMIN — GABAPENTIN 300 MILLIGRAM(S): 400 CAPSULE ORAL at 06:24

## 2023-11-01 RX ADMIN — OXYCODONE HYDROCHLORIDE 10 MILLIGRAM(S): 5 TABLET ORAL at 17:21

## 2023-11-01 RX ADMIN — ATORVASTATIN CALCIUM 80 MILLIGRAM(S): 80 TABLET, FILM COATED ORAL at 21:59

## 2023-11-01 RX ADMIN — OXYCODONE HYDROCHLORIDE 10 MILLIGRAM(S): 5 TABLET ORAL at 06:56

## 2023-11-01 RX ADMIN — ARIPIPRAZOLE 5 MILLIGRAM(S): 15 TABLET ORAL at 21:59

## 2023-11-01 RX ADMIN — OXYCODONE HYDROCHLORIDE 10 MILLIGRAM(S): 5 TABLET ORAL at 06:26

## 2023-11-01 RX ADMIN — Medication 100 MICROGRAM(S): at 06:24

## 2023-11-01 RX ADMIN — Medication 1 MILLIGRAM(S): at 06:34

## 2023-11-01 NOTE — PROGRESS NOTE ADULT - SUBJECTIVE AND OBJECTIVE BOX
*******************************  Clifton Urban MD (PGY-1)  Internal Medicine  Contact via [employer] TEAMS  *******************************    PROGRESS NOTE:     Patient is a 72y old  Female who presents with a chief complaint of Abnormal labs, elbow fx (2023 02:59)      INTERVAL EVENTS: No acute overnight events.     SUBJECTIVE: Patient seen and examined at bedside. This morning, the patient is comfortable and doing well. No acute complaints. Denies fevers, chills, N/V/D, chest pain, SOB, abdominal pain.    MEDICATIONS  (STANDING):  ARIPiprazole 5 milliGRAM(s) Oral at bedtime  aspirin  chewable 81 milliGRAM(s) Oral daily  atorvastatin 80 milliGRAM(s) Oral at bedtime  buPROPion . 75 milliGRAM(s) Oral daily  clonazePAM  Tablet 1 milliGRAM(s) Oral three times a day  gabapentin 300 milliGRAM(s) Oral two times a day  heparin   Injectable 5000 Unit(s) SubCutaneous every 12 hours  lactobacillus acidophilus 1 Tablet(s) Oral daily  levothyroxine 100 MICROGram(s) Oral daily  methylphenidate ER (CONCERTA) 27 milliGRAM(s) Oral daily  montelukast 10 milliGRAM(s) Oral daily  pantoprazole    Tablet 40 milliGRAM(s) Oral before breakfast    MEDICATIONS  (PRN):  acetaminophen     Tablet .. 650 milliGRAM(s) Oral every 6 hours PRN Temp greater or equal to 38C (100.4F), Mild Pain (1 - 3)  albuterol    90 MICROgram(s) HFA Inhaler 2 Puff(s) Inhalation every 6 hours PRN Shortness of Breath and/or Wheezing  aluminum hydroxide/magnesium hydroxide/simethicone Suspension 30 milliLiter(s) Oral every 4 hours PRN Dyspepsia  melatonin 3 milliGRAM(s) Oral at bedtime PRN Insomnia  ondansetron Injectable 4 milliGRAM(s) IV Push every 8 hours PRN Nausea and/or Vomiting  oxyCODONE    IR 5 milliGRAM(s) Oral every 4 hours PRN Moderate Pain (4 - 6)  oxyCODONE    IR 10 milliGRAM(s) Oral every 4 hours PRN Severe Pain (7 - 10)      CAPILLARY BLOOD GLUCOSE        I&O's Summary      PHYSICAL EXAM:  Vital Signs Last 24 Hrs  T(C): 36.2 (2023 04:10), Max: 37 (31 Oct 2023 15:08)  T(F): 97.1 (2023 04:10), Max: 98.6 (31 Oct 2023 15:08)  HR: 86 (2023 04:10) (79 - 103)  BP: 118/52 (2023 04:10) (116/54 - 133/61)  BP(mean): --  RR: 20 (2023 04:10) (17 - 20)  SpO2: 96% (2023 04:10) (96% - 99%)    Parameters below as of 2023 04:10  Patient On (Oxygen Delivery Method): room air        GENERAL: NAD, lying in bed comfortably  HEAD: Atraumatic, normocephalic  EYES: EOMI, PERRLA  ENT: Moist mucous membranes  NECK: Supple, no JVD  HEART: S1, S2, Regular rate and rhythm, no murmurs, rubs, or gallops  LUNGS: Unlabored respirations, clear to auscultation bilaterally, no crackles, wheezing, or rhonchi  ABDOMEN: Soft, nontender, nondistended, +BS  EXTREMITIES: No clubbing, cyanosis, or edema  NERVOUS SYSTEM:  A&Ox3, no focal deficits   SKIN: No rashes or lesions    LABS:                        11.0   5.26  )-----------( 176      ( 2023 02:59 )             32.0     11    135  |  99  |  9   ----------------------------<  100<H>  4.6   |  25  |  0.60    Ca    9.6      :59    TPro  6.8  /  Alb  3.9  /  TBili  0.3  /  DBili  x   /  AST  48<H>  /  ALT  45  /  AlkPhos  91  10-31    PT/INR - ( :59 )   PT: 10.6 sec;   INR: 0.96 ratio         PTT - ( :59 )  PTT:32.1 sec      Urinalysis Basic - ( 2023 04:39 )    Color: Yellow / Appearance: Clear / S.005 / pH: x  Gluc: x / Ketone: Negative mg/dL  / Bili: Negative / Urobili: 1.0 mg/dL   Blood: x / Protein: Negative mg/dL / Nitrite: Negative   Leuk Esterase: Trace / RBC: 2 /HPF / WBC 1 /HPF   Sq Epi: x / Non Sq Epi: 1 /HPF / Bacteria: Negative /HPF          RADIOLOGY & ADDITIONAL TESTS:  Results Reviewed:   Imaging Personally Reviewed:  Electrocardiogram Personally Reviewed:  Tele: *******************************  Clifton Urban MD (PGY-1)  Internal Medicine  Contact via [employer] TEAMS  *******************************    PROGRESS NOTE:     Patient is a 72y old  Female who presents with a chief complaint of Abnormal labs, elbow fx (2023 02:59)      INTERVAL EVENTS: No acute overnight events.     SUBJECTIVE: Patient seen and examined at bedside. Reports some elbow pain. Denies fevers, chills, N/V/D, chest pain, SOB, abdominal pain.    MEDICATIONS  (STANDING):  ARIPiprazole 5 milliGRAM(s) Oral at bedtime  aspirin  chewable 81 milliGRAM(s) Oral daily  atorvastatin 80 milliGRAM(s) Oral at bedtime  buPROPion . 75 milliGRAM(s) Oral daily  clonazePAM  Tablet 1 milliGRAM(s) Oral three times a day  gabapentin 300 milliGRAM(s) Oral two times a day  heparin   Injectable 5000 Unit(s) SubCutaneous every 12 hours  lactobacillus acidophilus 1 Tablet(s) Oral daily  levothyroxine 100 MICROGram(s) Oral daily  methylphenidate ER (CONCERTA) 27 milliGRAM(s) Oral daily  montelukast 10 milliGRAM(s) Oral daily  pantoprazole    Tablet 40 milliGRAM(s) Oral before breakfast    MEDICATIONS  (PRN):  acetaminophen     Tablet .. 650 milliGRAM(s) Oral every 6 hours PRN Temp greater or equal to 38C (100.4F), Mild Pain (1 - 3)  albuterol    90 MICROgram(s) HFA Inhaler 2 Puff(s) Inhalation every 6 hours PRN Shortness of Breath and/or Wheezing  aluminum hydroxide/magnesium hydroxide/simethicone Suspension 30 milliLiter(s) Oral every 4 hours PRN Dyspepsia  melatonin 3 milliGRAM(s) Oral at bedtime PRN Insomnia  ondansetron Injectable 4 milliGRAM(s) IV Push every 8 hours PRN Nausea and/or Vomiting  oxyCODONE    IR 5 milliGRAM(s) Oral every 4 hours PRN Moderate Pain (4 - 6)  oxyCODONE    IR 10 milliGRAM(s) Oral every 4 hours PRN Severe Pain (7 - 10)      CAPILLARY BLOOD GLUCOSE        I&O's Summary      PHYSICAL EXAM:  Vital Signs Last 24 Hrs  T(C): 36.2 (2023 04:10), Max: 37 (31 Oct 2023 15:08)  T(F): 97.1 (2023 04:10), Max: 98.6 (31 Oct 2023 15:08)  HR: 86 (2023 04:10) (79 - 103)  BP: 118/52 (2023 04:10) (116/54 - 133/61)  BP(mean): --  RR: 20 (2023 04:10) (17 - 20)  SpO2: 96% (2023 04:10) (96% - 99%)    Parameters below as of 2023 04:10  Patient On (Oxygen Delivery Method): room air        GENERAL: NAD, lying in bed comfortably  HEAD: Atraumatic, normocephalic  EYES: EOMI, PERRLA  ENT: Moist mucous membranes  NECK: Supple, no JVD  HEART: S1, S2, Regular rate and rhythm, no murmurs, rubs, or gallops  LUNGS: Unlabored respirations, clear to auscultation bilaterally, no crackles, wheezing, or rhonchi  ABDOMEN: Soft, nontender, nondistended, +BS  EXTREMITIES: No clubbing, cyanosis, or edema  NERVOUS SYSTEM:  A&Ox3, no focal deficits   SKIN: No rashes or lesions    LABS:                        11.0   5.26  )-----------( 176      ( 2023 02:59 )             32.0     11    135  |  99  |  9   ----------------------------<  100<H>  4.6   |  25  |  0.60    Ca    9.6      2023 02:59    TPro  6.8  /  Alb  3.9  /  TBili  0.3  /  DBili  x   /  AST  48<H>  /  ALT  45  /  AlkPhos  91  10-31    PT/INR - ( 2023 02:59 )   PT: 10.6 sec;   INR: 0.96 ratio         PTT - ( 2023 02:59 )  PTT:32.1 sec      Urinalysis Basic - ( 2023 04:39 )    Color: Yellow / Appearance: Clear / S.005 / pH: x  Gluc: x / Ketone: Negative mg/dL  / Bili: Negative / Urobili: 1.0 mg/dL   Blood: x / Protein: Negative mg/dL / Nitrite: Negative   Leuk Esterase: Trace / RBC: 2 /HPF / WBC 1 /HPF   Sq Epi: x / Non Sq Epi: 1 /HPF / Bacteria: Negative /HPF          RADIOLOGY & ADDITIONAL TESTS:  Results Reviewed:   Imaging Personally Reviewed:  Electrocardiogram Personally Reviewed:  Tele: *******************************  Clifton Urban MD (PGY-1)  Internal Medicine  Contact via [employer] TEAMS  *******************************    PROGRESS NOTE:     Patient is a 72y old  Female who presents with a chief complaint of Abnormal labs, elbow fx (2023 02:59)      INTERVAL EVENTS: No acute overnight events.     SUBJECTIVE: Patient seen and examined at bedside. Reports elbow pain. Denies fevers, chills, N/V/D, chest pain, SOB, abdominal pain.    MEDICATIONS  (STANDING):  ARIPiprazole 5 milliGRAM(s) Oral at bedtime  aspirin  chewable 81 milliGRAM(s) Oral daily  atorvastatin 80 milliGRAM(s) Oral at bedtime  buPROPion . 75 milliGRAM(s) Oral daily  clonazePAM  Tablet 1 milliGRAM(s) Oral three times a day  gabapentin 300 milliGRAM(s) Oral two times a day  heparin   Injectable 5000 Unit(s) SubCutaneous every 12 hours  lactobacillus acidophilus 1 Tablet(s) Oral daily  levothyroxine 100 MICROGram(s) Oral daily  methylphenidate ER (CONCERTA) 27 milliGRAM(s) Oral daily  montelukast 10 milliGRAM(s) Oral daily  pantoprazole    Tablet 40 milliGRAM(s) Oral before breakfast    MEDICATIONS  (PRN):  acetaminophen     Tablet .. 650 milliGRAM(s) Oral every 6 hours PRN Temp greater or equal to 38C (100.4F), Mild Pain (1 - 3)  albuterol    90 MICROgram(s) HFA Inhaler 2 Puff(s) Inhalation every 6 hours PRN Shortness of Breath and/or Wheezing  aluminum hydroxide/magnesium hydroxide/simethicone Suspension 30 milliLiter(s) Oral every 4 hours PRN Dyspepsia  melatonin 3 milliGRAM(s) Oral at bedtime PRN Insomnia  ondansetron Injectable 4 milliGRAM(s) IV Push every 8 hours PRN Nausea and/or Vomiting  oxyCODONE    IR 5 milliGRAM(s) Oral every 4 hours PRN Moderate Pain (4 - 6)  oxyCODONE    IR 10 milliGRAM(s) Oral every 4 hours PRN Severe Pain (7 - 10)      CAPILLARY BLOOD GLUCOSE        I&O's Summary      PHYSICAL EXAM:  Vital Signs Last 24 Hrs  T(C): 36.2 (2023 04:10), Max: 37 (31 Oct 2023 15:08)  T(F): 97.1 (2023 04:10), Max: 98.6 (31 Oct 2023 15:08)  HR: 86 (2023 04:10) (79 - 103)  BP: 118/52 (2023 04:10) (116/54 - 133/61)  BP(mean): --  RR: 20 (2023 04:10) (17 - 20)  SpO2: 96% (2023 04:10) (96% - 99%)    Parameters below as of 2023 04:10  Patient On (Oxygen Delivery Method): room air        GENERAL: NAD, lying in bed comfortably  HEAD: Atraumatic, normocephalic  HEART: S1, S2, Regular rate and rhythm, no murmurs, rubs, or gallops  LUNGS: Unlabored respirations, clear to auscultation bilaterally, no crackles, wheezing, or rhonchi  ABDOMEN: Soft, nontender, nondistended  EXTREMITIES: Sling on R arm. No sensory deficits on R arm or motor deficits in R hand  NERVOUS SYSTEM:  A&Ox3      LABS:                        11.0   5.26  )-----------( 176      ( 2023 02:59 )             32.0     11-01    135  |  99  |  9   ----------------------------<  100<H>  4.6   |  25  |  0.60    Ca    9.6      2023 02:59    TPro  6.8  /  Alb  3.9  /  TBili  0.3  /  DBili  x   /  AST  48<H>  /  ALT  45  /  AlkPhos  91  10-31    PT/INR - ( 2023 02:59 )   PT: 10.6 sec;   INR: 0.96 ratio         PTT - ( 2023 02:59 )  PTT:32.1 sec      Urinalysis Basic - ( 2023 04:39 )    Color: Yellow / Appearance: Clear / S.005 / pH: x  Gluc: x / Ketone: Negative mg/dL  / Bili: Negative / Urobili: 1.0 mg/dL   Blood: x / Protein: Negative mg/dL / Nitrite: Negative   Leuk Esterase: Trace / RBC: 2 /HPF / WBC 1 /HPF   Sq Epi: x / Non Sq Epi: 1 /HPF / Bacteria: Negative /HPF          RADIOLOGY & ADDITIONAL TESTS:  Results Reviewed:   Imaging Personally Reviewed:  Electrocardiogram Personally Reviewed:  Tele:

## 2023-11-01 NOTE — PRE-ANESTHESIA EVALUATION ADULT - NSANTHPMHFT_GEN_ALL_CORE
chart and consultant notes reviewed. no hx sig cv/pulm dz - states et > 1 flight, no cp/sob. recent unremarkable stress/tte per cv. 5/23 stroke, no current residual. laryngeal ca s/p 2019 laryngectomy - uncuffed trach in-situ, pt removes daily. hyponatremia, corrected

## 2023-11-01 NOTE — CONSULT NOTE ADULT - ASSESSMENT
ASSESSMENT & PLAN  72yFemale w/ R olecranon fx s/p immobilization.  -NWB RUE in a posterior slab splint  -splint precautions  -OR Tomorrow with Dr. Park, please document medical clearance ASAP  - FU Preop labs at 0100: CBC, BMP, Coags, Type and screen  - NPO @ midnight, IVF  -pain control  -ice/cold compress, elevation      For all questions related to patient care, please reach out to the on-call team via the pager.     Janki Rivera, PGY 2  Orthopaedic Surgery  [facility] [phone]  [facility] [phone]  [facility] [phone]        
72y F pmh GERD, CVA, laryngeal CA s/p trach, hypothyroidism, breast CA presents to ED c/o fatigue iso low sodium c/b mechanical fall pending surgical intervention being admitted for further management

## 2023-11-01 NOTE — PRE PROCEDURE NOTE - PRE PROCEDURE EVALUATION
PHYSICAL EXAM  Gen: Lying in bed, NAD  Resp: No increased WOB  RUE:  Skin intact, +edema and +ecchymosis over elbow  +TTP over elbow, no palpable triceps defect, no TTP along remainder of extremity; compartments soft  Limited elbow ROM 2/2 pain  Motor: Musc/Med/Rad/AIN/PIN/U intact  Sensory: Musc/Med/Rad/U SILT  +Rad pulse, WWP    72yFemale w/ R olecranon fx s/p immobilization.  -NWB RUE in a posterior slab splint  -splint precautions  -OR Today with Dr. Park, please document medical clearance ASAP  - FU Preop labs at 0100: CBC, BMP, Coags, Type and screen  - NPO @ midnight, IVF  -pain control  -ice/cold compress, elevation      For all questions related to patient care, please reach out to the on-call team via the pager.     Janki Rivera, PGY 2  Orthopaedic Surgery  [facility] [phone]  [facility] [phone]  [facility] [phone]

## 2023-11-01 NOTE — CONSULT NOTE ADULT - SUBJECTIVE AND OBJECTIVE BOX
HPI  72yFemale R-hand dominant w/ PMH of Laryngeal CA (s/p resection), fibromyalgia, hypothyroidism c/o R elbow pain s/p mechanical fall  went to [facility] and was placed in splint on . Denies headstrike or LOC. Denies numbness/tingling in the RUE. Denies any other trauma/injuries at this time. Patient states she was sent to [facility] by PMD due to low sodium.    ROS  Negative unless otherwise specified in HPI.    PAST MEDICAL & SURGICAL Hx  PAST MEDICAL & SURGICAL HISTORY:  Depression      Fibromyalgia      Asthma  no h/o recent exacerbation      Herniated Disc      Hypothyroid      Breast Cancer  s/p bilateral mastectomy      Anxiety      Osteoporosis      Osteoarthritis  critera      Environmental Allergies  dust, mold, mildew, cat dander,      Laryngeal cancer  2015 & had Radiation      Arthritis      Neuropathy  lumbar herniated disc affecting feet      Diverticulitis  history treated with ABT      GERD (gastroesophageal reflux disease)      CVA (cerebrovascular accident)      Age-related nuclear cataract, right eye       Section  X 2      S/P Cholecystectomy        S/P Lumpectomy of Breast  left       Anal Abscess        Cosmetic Surgery  liposuction abdomen 11/24/10      S/P Gastric Bypass   Dr. Gibson      S/P carpal tunnel release  Bilateral      S/P mastectomy, bilateral  2010      H/O breast surgery  Reconstructive surgery - Right & Left      S/P excision of vocal cord nodule  2015      History of laryngoscopy  excision of lesion & biopsy 2018      S/P laryngectomy      History of loop recorder          MEDICATIONS  Home Medications:  Acidophilus oral tablet: 1 tab(s) orally once a day (31 Oct 2023 20:44)  ARIPiprazole 5 mg oral tablet: 1 orally once a day (at bedtime) (31 Oct 2023 20:44)  aspirin 81 mg oral tablet, chewable: 1 tab(s) orally once a day (31 Oct 2023 20:44)  atorvastatin 80 mg oral tablet: 1 tab(s) orally once a day (31 Oct 2023 20:44)  biotin: 72339 mcg orally once a day (31 Oct 2023 20:44)  buPROPion 75 mg oral tablet: 1 orally once a day (31 Oct 2023 20:44)  clonazePAM 1 mg oral tablet: 1 tab(s) orally 3 times a day (31 Oct 2023 20:44)  doxepin: 50 milligram(s) orally once a day (at bedtime) (31 Oct 2023 20:44)  gabapentin 300 mg oral capsule: 1 cap(s) orally 2 times a day (31 Oct 2023 20:44)  levothyroxine 100 mcg (0.1 mg) oral tablet: 1 tab(s) orally once a day (31 Oct 2023 20:44)  methylphenidate 27 mg/24 hr oral tablet, extended release: 1 orally once a day (31 Oct 2023 20:44)  montelukast 10 mg oral tablet: 1 tab(s) orally once a day (31 Oct 2023 20:44)  olmesartan-hydrochlorothiazide 20 mg-12.5 mg oral tablet: 1 tab(s) orally once a day (31 Oct 2023 20:44)  ProAir HFA 90 mcg/inh inhalation aerosol: 2 puff(s) inhaled every 6 hours as needed (31 Oct 2023 20:44)  Viactive dietary supplement: 1 chewable tab orally once a day LD 10/22/2023 (31 Oct 2023 20:44)      ALLERGIES  sulfa drugs (Other)      FAMILY Hx  FAMILY HISTORY:  Family history of liver cancer    Family history of breast cancer    Family history of stroke        SOCIAL Hx  Social History:      VITALS  Vital Signs Last 24 Hrs  T(C): 36.4 (31 Oct 2023 20:10), Max: 37 (31 Oct 2023 15:08)  T(F): 97.5 (31 Oct 2023 20:10), Max: 98.6 (31 Oct 2023 15:08)  HR: 79 (31 Oct 2023 20:10) (79 - 95)  BP: 122/75 (31 Oct 2023 20:10) (116/54 - 122/75)  BP(mean): --  RR: 18 (31 Oct 2023 20:10) (17 - 18)  SpO2: 97% (31 Oct 2023 20:10) (97% - 98%)    Parameters below as of 31 Oct 2023 20:10  Patient On (Oxygen Delivery Method): room air        PHYSICAL EXAM  Gen: Lying in bed, NAD  Resp: No increased WOB  RUE:  Skin intact, +edema and +ecchymosis over elbow  +TTP over elbow, no palpable triceps defect, no TTP along remainder of extremity; compartments soft  Limited elbow ROM 2/2 pain  Motor: Musc/Med/Rad/AIN/PIN/U intact  Sensory: Musc/Med/Rad/U SILT  +Rad pulse, [facility]    LABS    10-31    131<L>  |  98  |  8   ----------------------------<  96  5.1   |  22  |  0.53    Ca    9.5      31 Oct 2023 20:27    TPro  6.8  /  Alb  3.9  /  TBili  0.3  /  DBili  x   /  AST  48<H>  /  ALT  45  /  AlkPhos  91  10-31        IMAGING  XRs: R olecranon fx     PROCEDURE  A well-padded, well-molded plaster splint was applied, and the patient was neurovascularly intact afterward. The patient tolerated the procedure well without evidence of complications. Post-reduction XRs demonstrated acceptable alignment. The patient was informed about splint precautions (keep dry, do not stick anything inside, monitor for signs/symptoms of increased compartmental pressure: uncontrolled pain, worsening numbness/tingling, severe pain with movement of the fingers/toes) and verbalized understanding.    
CHIEF COMPLAINT:    HISTORY OF PRESENT ILLNESS:  72y F well known to me from office pmh GERD, CVA, laryngeal CA s/p trach, hypothyroidism, breast CA presents to ED c/o fatigue iso low sodium on outpatient preop labs.  Patient had mechanical fall on , was eval at outside hospital, found to have right elbow fracture  No chest pain or shortness of breath   PAST MEDICAL & SURGICAL HISTORY:  Depression      Fibromyalgia      Asthma  no h/o recent exacerbation      Herniated Disc      Hypothyroid      Breast Cancer  s/p bilateral mastectomy      Anxiety      Osteoporosis      Osteoarthritis  critera      Environmental Allergies  dust, mold, mildew, cat dander,      Laryngeal cancer  2015 & had Radiation      Arthritis      Neuropathy  lumbar herniated disc affecting feet      Diverticulitis  history treated with ABT      GERD (gastroesophageal reflux disease)      CVA (cerebrovascular accident)      Age-related nuclear cataract, right eye       Section  X 2      S/P Cholecystectomy        S/P Lumpectomy of Breast  left       Anal Abscess        Cosmetic Surgery  liposuction abdomen 11/24/10      S/P Gastric Bypass   Dr. Gibson      S/P carpal tunnel release  Bilateral      S/P mastectomy, bilateral        H/O breast surgery  Reconstructive surgery - Right & Left      S/P excision of vocal cord nodule  2015      History of laryngoscopy  excision of lesion & biopsy 2018      S/P laryngectomy      History of loop recorder              MEDICATIONS:                  FAMILY HISTORY:  Family history of liver cancer    Family history of breast cancer    Family history of stroke        SOCIAL HISTORY:    [ ] Non-smoker  [ ] Smoker  [ ] Alcohol    Allergies    sulfa drugs (Other)    Intolerances    	    REVIEW OF SYSTEMS:  CONSTITUTIONAL: No fever, weight loss, or fatigue  EYES: No eye pain, visual disturbances, or discharge  ENMT:  No difficulty hearing, tinnitus, vertigo; No sinus or throat pain  NECK: No pain or stiffness  RESPIRATORY: No cough, wheezing, chills or hemoptysis; No Shortness of Breath  CARDIOVASCULAR: No chest pain, palpitations, passing out, dizziness, or leg swelling  GASTROINTESTINAL: No abdominal or epigastric pain. No nausea, vomiting, or hematemesis; No diarrhea or constipation. No melena or hematochezia.  GENITOURINARY: No dysuria, frequency, hematuria, or incontinence  NEUROLOGICAL: No headaches, memory loss, loss of strength, numbness, or tremors  SKIN: No itching, burning, rashes, or lesions   LYMPH Nodes: No enlarged glands  ENDOCRINE: No heat or cold intolerance; No hair loss  MUSCULOSKELETAL: + joint pain or swelling; No muscle, back, or extremity pain  PSYCHIATRIC: No depression, anxiety, mood swings, or difficulty sleeping  HEME/LYMPH: No easy bruising, or bleeding gums  ALLERY AND IMMUNOLOGIC: No hives or eczema	    [ ] All others negative	  [ ] Unable to obtain    PHYSICAL EXAM:  T(C): 36.2 (23 @ 04:10), Max: 37 (10-31-23 @ 15:08)  HR: 86 (23 @ 04:10) (79 - 103)  BP: 118/52 (23 @ 04:10) (116/54 - 133/61)  RR: 20 (23 @ 04:10) (17 - 20)  SpO2: 96% (23 @ 04:10) (96% - 99%)  Wt(kg): --  I&O's Summary      Appearance: NAD + trach  HEENT:   Dry  oral mucosa, PERRL, EOMI	  Lymphatic: No lymphadenopathy  Cardiovascular: Normal S1 S2, No JVD, No murmurs, No edema  Respiratory: decreased bs  Psychiatry: A & O x 3, Mood & affect appropriate  Gastrointestinal:  Soft, Non-tender, + BS	  Skin: No rashes, No ecchymoses, No cyanosis	  Neurologic: Non-focal  Extremities: RUE:  Skin intact, +edema and +ecchymosis over elbow  +TTP over elbow, no palpable triceps defect, no TTP along remainder of extremity; compartments soft  Limited elbow ROM 2/2 pain  Motor: Musc/Med/Rad/AIN/PIN/U intact  Sensory: Musc/Med/Rad/U SILT  +Rad pulse, WWP    Vascular: Peripheral pulses palpable 2+ bilaterally    TELEMETRY: 	    ECG:  	  RADIOLOGY:  < from: Xray Forearm, Right (10.31.23 @ 21:17) >    ACC: [id number] EXAM:  XR FOREARM 2 VIEWS RT   ORDERED BY:  BETH FERNANDEZ     ACC: [id number] EXAM:  XR ELBOW POST RED AP LAT 2V RT   ORDERED BY:  BETH FERNANDEZ     PROCEDURE DATE:  10/31/2023          INTERPRETATION:  XR ELBOW POST REDUCTION AP AND LATERAL 2 VIEWS RIGHT, XR   FOREARM 2 VIEWS RIGHT    HISTORY: Olecranon fracture.    VIEWS: 2 views each  IMAGES: 4    COMPARISON: Right elbow x-rays dated 10/31/2023    FINDINGS:    OSSEOUS STRUCTURES    Fractures:  Olecranon fracture is again seen with approximately one   half bone width posterior displacement of the fracture fragment.   Overlying splint is present.    RIGHT ELBOW JOINTS    Joint Space(s):  There is mild posterior subluxation of the humerus   with the olecranon fragment comparedto the remaining ulna and radius.   This results in mild widening of the radiocapitellar joint.    Effusion:  There is displacement of elbow fat pads. .    RIGHT WRIST JOINTS    Joint Space(s):  Moderate 1st carpometacarpal joint space narrowing is   present with small to moderate sized osteophytes.    IMPRESSION:  1.  Olecranon fracture is again seen with posterior displacement of the   olecranon fragment and humerus compared to the remaining ulna and radius.    --- End of Report ---      < end of copied text >  < from: Xray Forearm, Right (10.31.23 @ 20:23) >    ACC: [id number] EXAM:  XR HUMERUS MIN 2 VIEWS RT   ORDERED BY: VALENTINE BELLAMY     ACC: [id number] EXAM:  XR ELBOW COMP MIN 3V RT   ORDERED BY: VALENTINE BELLAMY     ACC: [id number] EXAM:  XR FOREARM 2 VIEWS RT   ORDERED BY: VALENTINE BELLAMY     PROCEDURE DATE:  10/31/2023          INTERPRETATION:  CLINICAL INDICATION: Right olecranon fracture seen on   outside imaging, status post splinting.    TECHNIQUE: 2 views of the right forearm, 3 views (4 images) of the right   elbow, 2 views of the right humerus.    COMPARISON: Right upper extremity radiographs 2022. Reported outside   study not available for review.    IMPRESSION:  Acute, displaced intra-articular fracture through the olecranon process   of the ulna. 11mm posterior offset of the olecranon relative to the ulnar   shaft. Associated elbow joint effusion.  There is splint material spanning from the mid humerus to the hand.  Mild arthrosis of the basilar joint, STT joint, radiocarpal joint, and of   the acromioclavicular joint.  Surgicalclips noted in the right axilla.    --- End of Report ---            < end of copied text >    OTHER: 	  	  LABS:	 	    CARDIAC MARKERS:                                  11.0   5.26  )-----------( 176      ( 2023 02:59 )             32.0     11-    135  |  99  |  9   ----------------------------<  100<H>  4.6   |  25  |  0.60    Ca    9.6      2023 02:59    TPro  6.8  /  Alb  3.9  /  TBili  0.3  /  DBili  x   /  AST  48<H>  /  ALT  45  /  AlkPhos  91  10-31    proBNP:   Lipid Profile:   HgA1c:   TSH:           d

## 2023-11-01 NOTE — CONSULT NOTE ADULT - PROBLEM SELECTOR RECOMMENDATION 9
Plan for OR   acceptable cardiac risk to proceed   Normal LVEF and no ischemia on recent SPECT in my office   Pain control

## 2023-11-01 NOTE — PROGRESS NOTE ADULT - PROBLEM SELECTOR PLAN 3
- BP stable off meds, will hold for now  - If BP becomes uncontrolled can start Olmesartan 20mg BP stable off meds, will hold for now  - If BP becomes uncontrolled can start Olmesartan 20mg

## 2023-11-01 NOTE — PROGRESS NOTE ADULT - PROBLEM SELECTOR PLAN 2
S/p mechanical fall 2days ago  - XR Rt arm & elbow: Acute intra-articular fracture through the olecranon process of the ulna  - Prn pain control   - Ortho consulted for eventual surgery once labs improve     - Denies coagulopathy  - No prior reaction to anesthesia  - Non smoker   -  Able to perform > 4mets  - RCRI: Class II (Hx/o cva)  - EKG: NSR   - Patient is Mod to high risk for mod risk procedure and is medically optimized   pending improvement in electrolyte distrubance HypoNa on O/P labs-- Na 125  - Endorses new BP med (HTZ)  was added to her regimen apx 2wk ago.  - Was previously only taking Olmesartan.   - Suspect addition of HTZ likely contributed to electrolyte disturbance   - EKG NSR    - F/u Urine studies  - Fluid restriction    - Trend labs  - Na now 135 11/1

## 2023-11-01 NOTE — PROGRESS NOTE ADULT - PROBLEM SELECTOR PLAN 1
HypoNa on O/P labs-- Na 125  - Endorses new BP med (HTZ)  was added to her regimen apx 2wk ago.  Was previously only taking Olmesartan.   Suspect addition of HTZ likely contributed to electrolyte disturbance   - EKG NSR    - Urine studies ordered  - Fluid restriction    - Trend labs  - BP stable off meds, will hold for now. If BP becomes uncontrolled can start Olmesartan 20mg S/p mechanical fall 2days ago  - XR Rt arm & elbow: Acute intra-articular fracture through the olecranon process of the ulna  - Prn pain control   - S/p ORIF R olecronon 11/  - Ortho consulted; f/u recs

## 2023-11-01 NOTE — BRIEF OPERATIVE NOTE - TYPE OF ANESTHESIA
-- DO NOT REPLY / DO NOT REPLY ALL --  -- Message is from the [facility]--    General Patient Message      Reason for Call: Patient grandson is calling requesting a call back following up on the messages his mother Jennifer left. Please assist.    Caller Information       Type Contact Phone    02/22/2022 03:30 PM CST Phone (Incoming) Franky  (Grandchild) [phone]          Alternative phone number: none    Turnaround time given to caller:   \"This message will be sent to [state Provider's name]. The clinical team will fulfill your request as soon as they review your message.\"    
General

## 2023-11-01 NOTE — PROGRESS NOTE ADULT - ASSESSMENT
ASSESSMENT & PLAN  72yFemale w/ R olecranon fx s/p immobilization.  -NWB RUE in a posterior slab splint  -splint precautions  -OR Today with Dr. Park  - VEENA Preop labs at 0100: CBC, BMP, Coags, Type and screen  - NPO @ midnight, IVF  -pain control  -ice/cold compress, elevation      For all questions related to patient care, please reach out to the on-call team via the pager.     Janki Rivera, PGY 2  Orthopaedic Surgery  [facility] [phone]  [facility] [phone]  [facility] [phone]

## 2023-11-01 NOTE — PROGRESS NOTE ADULT - SUBJECTIVE AND OBJECTIVE BOX
ORTHOPEDIC PROGRESS NOTE    Overnight events: None    SUBJECTIVE: Pt seen and examined at bedside. Patient is doing well, no acute complaints this AM. Pain is controlled with medication      OBJECTIVE:  Vital Signs Last 24 Hrs  T(C): 36.6 (31 Oct 2023 23:40), Max: 37 (31 Oct 2023 15:08)  T(F): 97.8 (31 Oct 2023 23:40), Max: 98.6 (31 Oct 2023 15:08)  HR: 103 (31 Oct 2023 23:40) (79 - 103)  BP: 133/61 (31 Oct 2023 23:40) (116/54 - 133/61)  BP(mean): --  RR: 20 (31 Oct 2023 23:40) (17 - 20)  SpO2: 99% (31 Oct 2023 23:40) (97% - 99%)    Parameters below as of 31 Oct 2023 23:40  Patient On (Oxygen Delivery Method): room air    Physical Examination:  GEN: NAD, resting quietly  PULM: symmetric chest rise bilaterally, no increased WOB  ABD: nondistended  EXTR:   RUE:  Skin intact, +edema and +ecchymosis over elbow  +TTP over elbow, no palpable triceps defect, no TTP along remainder of extremity; compartments soft  Limited elbow ROM 2/2 pain  Motor: Musc/Med/Rad/AIN/PIN/U intact  Sensory: Musc/Med/Rad/U SILT  +Rad pulse, WWP      LABS:                        11.4   5.09  )-----------( 207      ( 31 Oct 2023 22:37 )             32.8       10-31    131<L>  |  98  |  8   ----------------------------<  96  5.1   |  22  |  0.53    Ca    9.5      31 Oct 2023 20:27    TPro  6.8  /  Alb  3.9  /  TBili  0.3  /  DBili  x   /  AST  48<H>  /  ALT  45  /  AlkPhos  91  10-31

## 2023-11-02 ENCOUNTER — TRANSCRIPTION ENCOUNTER (OUTPATIENT)
Age: 73
End: 2023-11-02

## 2023-11-02 DIAGNOSIS — K59.00 CONSTIPATION, UNSPECIFIED: ICD-10-CM

## 2023-11-02 DIAGNOSIS — E87.1 HYPO-OSMOLALITY AND HYPONATREMIA: ICD-10-CM

## 2023-11-02 LAB
ALBUMIN SERPL ELPH-MCNC: 4 G/DL — SIGNIFICANT CHANGE UP (ref 3.3–5)
ALBUMIN SERPL ELPH-MCNC: 4 G/DL — SIGNIFICANT CHANGE UP (ref 3.3–5)
ALP SERPL-CCNC: 90 U/L — SIGNIFICANT CHANGE UP (ref 40–120)
ALP SERPL-CCNC: 90 U/L — SIGNIFICANT CHANGE UP (ref 40–120)
ALT FLD-CCNC: 34 U/L — SIGNIFICANT CHANGE UP (ref 10–45)
ALT FLD-CCNC: 34 U/L — SIGNIFICANT CHANGE UP (ref 10–45)
ANION GAP SERPL CALC-SCNC: 13 MMOL/L — SIGNIFICANT CHANGE UP (ref 5–17)
ANION GAP SERPL CALC-SCNC: 13 MMOL/L — SIGNIFICANT CHANGE UP (ref 5–17)
AST SERPL-CCNC: 31 U/L — SIGNIFICANT CHANGE UP (ref 10–40)
AST SERPL-CCNC: 31 U/L — SIGNIFICANT CHANGE UP (ref 10–40)
BASOPHILS # BLD AUTO: 0.02 K/UL — SIGNIFICANT CHANGE UP (ref 0–0.2)
BASOPHILS # BLD AUTO: 0.02 K/UL — SIGNIFICANT CHANGE UP (ref 0–0.2)
BASOPHILS NFR BLD AUTO: 0.2 % — SIGNIFICANT CHANGE UP (ref 0–2)
BASOPHILS NFR BLD AUTO: 0.2 % — SIGNIFICANT CHANGE UP (ref 0–2)
BILIRUB SERPL-MCNC: 0.4 MG/DL — SIGNIFICANT CHANGE UP (ref 0.2–1.2)
BILIRUB SERPL-MCNC: 0.4 MG/DL — SIGNIFICANT CHANGE UP (ref 0.2–1.2)
BUN SERPL-MCNC: 11 MG/DL — SIGNIFICANT CHANGE UP (ref 7–23)
BUN SERPL-MCNC: 11 MG/DL — SIGNIFICANT CHANGE UP (ref 7–23)
CALCIUM SERPL-MCNC: 8.9 MG/DL — SIGNIFICANT CHANGE UP (ref 8.4–10.5)
CALCIUM SERPL-MCNC: 8.9 MG/DL — SIGNIFICANT CHANGE UP (ref 8.4–10.5)
CHLORIDE SERPL-SCNC: 98 MMOL/L — SIGNIFICANT CHANGE UP (ref 96–108)
CHLORIDE SERPL-SCNC: 98 MMOL/L — SIGNIFICANT CHANGE UP (ref 96–108)
CO2 SERPL-SCNC: 21 MMOL/L — LOW (ref 22–31)
CO2 SERPL-SCNC: 21 MMOL/L — LOW (ref 22–31)
CREAT SERPL-MCNC: 0.66 MG/DL — SIGNIFICANT CHANGE UP (ref 0.5–1.3)
CREAT SERPL-MCNC: 0.66 MG/DL — SIGNIFICANT CHANGE UP (ref 0.5–1.3)
EGFR: 94 ML/MIN/1.73M2 — SIGNIFICANT CHANGE UP
EGFR: 94 ML/MIN/1.73M2 — SIGNIFICANT CHANGE UP
EOSINOPHIL # BLD AUTO: 0.01 K/UL — SIGNIFICANT CHANGE UP (ref 0–0.5)
EOSINOPHIL # BLD AUTO: 0.01 K/UL — SIGNIFICANT CHANGE UP (ref 0–0.5)
EOSINOPHIL NFR BLD AUTO: 0.1 % — SIGNIFICANT CHANGE UP (ref 0–6)
EOSINOPHIL NFR BLD AUTO: 0.1 % — SIGNIFICANT CHANGE UP (ref 0–6)
GLUCOSE SERPL-MCNC: 185 MG/DL — HIGH (ref 70–99)
GLUCOSE SERPL-MCNC: 185 MG/DL — HIGH (ref 70–99)
HCT VFR BLD CALC: 32.6 % — LOW (ref 34.5–45)
HCT VFR BLD CALC: 32.6 % — LOW (ref 34.5–45)
HGB BLD-MCNC: 11.1 G/DL — LOW (ref 11.5–15.5)
HGB BLD-MCNC: 11.1 G/DL — LOW (ref 11.5–15.5)
IMM GRANULOCYTES NFR BLD AUTO: 0.4 % — SIGNIFICANT CHANGE UP (ref 0–0.9)
IMM GRANULOCYTES NFR BLD AUTO: 0.4 % — SIGNIFICANT CHANGE UP (ref 0–0.9)
LYMPHOCYTES # BLD AUTO: 0.71 K/UL — LOW (ref 1–3.3)
LYMPHOCYTES # BLD AUTO: 0.71 K/UL — LOW (ref 1–3.3)
LYMPHOCYTES # BLD AUTO: 7.4 % — LOW (ref 13–44)
LYMPHOCYTES # BLD AUTO: 7.4 % — LOW (ref 13–44)
MAGNESIUM SERPL-MCNC: 2.1 MG/DL — SIGNIFICANT CHANGE UP (ref 1.6–2.6)
MAGNESIUM SERPL-MCNC: 2.1 MG/DL — SIGNIFICANT CHANGE UP (ref 1.6–2.6)
MCHC RBC-ENTMCNC: 32.3 PG — SIGNIFICANT CHANGE UP (ref 27–34)
MCHC RBC-ENTMCNC: 32.3 PG — SIGNIFICANT CHANGE UP (ref 27–34)
MCHC RBC-ENTMCNC: 34 GM/DL — SIGNIFICANT CHANGE UP (ref 32–36)
MCHC RBC-ENTMCNC: 34 GM/DL — SIGNIFICANT CHANGE UP (ref 32–36)
MCV RBC AUTO: 94.8 FL — SIGNIFICANT CHANGE UP (ref 80–100)
MCV RBC AUTO: 94.8 FL — SIGNIFICANT CHANGE UP (ref 80–100)
MONOCYTES # BLD AUTO: 0.22 K/UL — SIGNIFICANT CHANGE UP (ref 0–0.9)
MONOCYTES # BLD AUTO: 0.22 K/UL — SIGNIFICANT CHANGE UP (ref 0–0.9)
MONOCYTES NFR BLD AUTO: 2.3 % — SIGNIFICANT CHANGE UP (ref 2–14)
MONOCYTES NFR BLD AUTO: 2.3 % — SIGNIFICANT CHANGE UP (ref 2–14)
MRSA PCR RESULT.: SIGNIFICANT CHANGE UP
MRSA PCR RESULT.: SIGNIFICANT CHANGE UP
NEUTROPHILS # BLD AUTO: 8.59 K/UL — HIGH (ref 1.8–7.4)
NEUTROPHILS # BLD AUTO: 8.59 K/UL — HIGH (ref 1.8–7.4)
NEUTROPHILS NFR BLD AUTO: 89.6 % — HIGH (ref 43–77)
NEUTROPHILS NFR BLD AUTO: 89.6 % — HIGH (ref 43–77)
NRBC # BLD: 0 /100 WBCS — SIGNIFICANT CHANGE UP (ref 0–0)
NRBC # BLD: 0 /100 WBCS — SIGNIFICANT CHANGE UP (ref 0–0)
PHOSPHATE SERPL-MCNC: 2.8 MG/DL — SIGNIFICANT CHANGE UP (ref 2.5–4.5)
PHOSPHATE SERPL-MCNC: 2.8 MG/DL — SIGNIFICANT CHANGE UP (ref 2.5–4.5)
PLATELET # BLD AUTO: 242 K/UL — SIGNIFICANT CHANGE UP (ref 150–400)
PLATELET # BLD AUTO: 242 K/UL — SIGNIFICANT CHANGE UP (ref 150–400)
POTASSIUM SERPL-MCNC: 4.6 MMOL/L — SIGNIFICANT CHANGE UP (ref 3.5–5.3)
POTASSIUM SERPL-MCNC: 4.6 MMOL/L — SIGNIFICANT CHANGE UP (ref 3.5–5.3)
POTASSIUM SERPL-SCNC: 4.6 MMOL/L — SIGNIFICANT CHANGE UP (ref 3.5–5.3)
POTASSIUM SERPL-SCNC: 4.6 MMOL/L — SIGNIFICANT CHANGE UP (ref 3.5–5.3)
PROT SERPL-MCNC: 6.5 G/DL — SIGNIFICANT CHANGE UP (ref 6–8.3)
PROT SERPL-MCNC: 6.5 G/DL — SIGNIFICANT CHANGE UP (ref 6–8.3)
RBC # BLD: 3.44 M/UL — LOW (ref 3.8–5.2)
RBC # BLD: 3.44 M/UL — LOW (ref 3.8–5.2)
RBC # FLD: 13 % — SIGNIFICANT CHANGE UP (ref 10.3–14.5)
RBC # FLD: 13 % — SIGNIFICANT CHANGE UP (ref 10.3–14.5)
S AUREUS DNA NOSE QL NAA+PROBE: DETECTED
S AUREUS DNA NOSE QL NAA+PROBE: DETECTED
SODIUM SERPL-SCNC: 132 MMOL/L — LOW (ref 135–145)
SODIUM SERPL-SCNC: 132 MMOL/L — LOW (ref 135–145)
WBC # BLD: 9.59 K/UL — SIGNIFICANT CHANGE UP (ref 3.8–10.5)
WBC # BLD: 9.59 K/UL — SIGNIFICANT CHANGE UP (ref 3.8–10.5)
WBC # FLD AUTO: 9.59 K/UL — SIGNIFICANT CHANGE UP (ref 3.8–10.5)
WBC # FLD AUTO: 9.59 K/UL — SIGNIFICANT CHANGE UP (ref 3.8–10.5)

## 2023-11-02 PROCEDURE — 99232 SBSQ HOSP IP/OBS MODERATE 35: CPT | Mod: GC

## 2023-11-02 RX ORDER — ONDANSETRON 8 MG/1
4 TABLET, FILM COATED ORAL ONCE
Refills: 0 | Status: COMPLETED | OUTPATIENT
Start: 2023-11-02 | End: 2023-11-02

## 2023-11-02 RX ORDER — GABAPENTIN 400 MG/1
1 CAPSULE ORAL
Qty: 0 | Refills: 0 | DISCHARGE
Start: 2023-11-02

## 2023-11-02 RX ORDER — SENNA PLUS 8.6 MG/1
1 TABLET ORAL DAILY
Refills: 0 | Status: DISCONTINUED | OUTPATIENT
Start: 2023-11-02 | End: 2023-11-03

## 2023-11-02 RX ORDER — LEVOTHYROXINE SODIUM 125 MCG
1 TABLET ORAL
Qty: 0 | Refills: 0 | DISCHARGE
Start: 2023-11-02

## 2023-11-02 RX ORDER — CLONAZEPAM 1 MG
1 TABLET ORAL
Refills: 0 | DISCHARGE

## 2023-11-02 RX ORDER — POLYETHYLENE GLYCOL 3350 17 G/17G
17 POWDER, FOR SOLUTION ORAL DAILY
Refills: 0 | Status: DISCONTINUED | OUTPATIENT
Start: 2023-11-02 | End: 2023-11-02

## 2023-11-02 RX ORDER — MUPIROCIN 20 MG/G
1 OINTMENT TOPICAL
Refills: 0 | Status: DISCONTINUED | OUTPATIENT
Start: 2023-11-02 | End: 2023-11-03

## 2023-11-02 RX ORDER — OXYCODONE HYDROCHLORIDE 5 MG/1
5 TABLET ORAL ONCE
Refills: 0 | Status: DISCONTINUED | OUTPATIENT
Start: 2023-11-02 | End: 2023-11-02

## 2023-11-02 RX ORDER — POLYETHYLENE GLYCOL 3350 17 G/17G
17 POWDER, FOR SOLUTION ORAL
Refills: 0 | Status: DISCONTINUED | OUTPATIENT
Start: 2023-11-02 | End: 2023-11-03

## 2023-11-02 RX ORDER — ATORVASTATIN CALCIUM 80 MG/1
1 TABLET, FILM COATED ORAL
Qty: 0 | Refills: 0 | DISCHARGE
Start: 2023-11-02

## 2023-11-02 RX ORDER — ARIPIPRAZOLE 15 MG/1
1 TABLET ORAL
Qty: 0 | Refills: 0 | DISCHARGE
Start: 2023-11-02

## 2023-11-02 RX ORDER — ARIPIPRAZOLE 15 MG/1
1 TABLET ORAL
Refills: 0 | DISCHARGE

## 2023-11-02 RX ORDER — ATORVASTATIN CALCIUM 80 MG/1
1 TABLET, FILM COATED ORAL
Refills: 0 | DISCHARGE

## 2023-11-02 RX ORDER — ALBUTEROL 90 UG/1
2 AEROSOL, METERED ORAL
Qty: 0 | Refills: 0 | DISCHARGE
Start: 2023-11-02

## 2023-11-02 RX ORDER — ALBUTEROL 90 UG/1
2 AEROSOL, METERED ORAL
Refills: 0 | DISCHARGE

## 2023-11-02 RX ORDER — ASPIRIN/CALCIUM CARB/MAGNESIUM 324 MG
1 TABLET ORAL
Qty: 0 | Refills: 0 | DISCHARGE
Start: 2023-11-02

## 2023-11-02 RX ORDER — MONTELUKAST 4 MG/1
1 TABLET, CHEWABLE ORAL
Qty: 0 | Refills: 0 | DISCHARGE
Start: 2023-11-02

## 2023-11-02 RX ORDER — LOSARTAN POTASSIUM 100 MG/1
50 TABLET, FILM COATED ORAL DAILY
Refills: 0 | Status: DISCONTINUED | OUTPATIENT
Start: 2023-11-02 | End: 2023-11-03

## 2023-11-02 RX ORDER — CLONAZEPAM 1 MG
1 TABLET ORAL
Qty: 0 | Refills: 0 | DISCHARGE
Start: 2023-11-02

## 2023-11-02 RX ADMIN — MUPIROCIN 1 APPLICATION(S): 20 OINTMENT TOPICAL at 18:42

## 2023-11-02 RX ADMIN — OXYCODONE HYDROCHLORIDE 5 MILLIGRAM(S): 5 TABLET ORAL at 08:39

## 2023-11-02 RX ADMIN — Medication 1 MILLIGRAM(S): at 22:49

## 2023-11-02 RX ADMIN — Medication 650 MILLIGRAM(S): at 05:03

## 2023-11-02 RX ADMIN — GABAPENTIN 300 MILLIGRAM(S): 400 CAPSULE ORAL at 22:48

## 2023-11-02 RX ADMIN — Medication 100 MICROGRAM(S): at 05:02

## 2023-11-02 RX ADMIN — ATORVASTATIN CALCIUM 80 MILLIGRAM(S): 80 TABLET, FILM COATED ORAL at 22:49

## 2023-11-02 RX ADMIN — OXYCODONE HYDROCHLORIDE 10 MILLIGRAM(S): 5 TABLET ORAL at 16:05

## 2023-11-02 RX ADMIN — MONTELUKAST 10 MILLIGRAM(S): 4 TABLET, CHEWABLE ORAL at 22:49

## 2023-11-02 RX ADMIN — GABAPENTIN 300 MILLIGRAM(S): 400 CAPSULE ORAL at 10:26

## 2023-11-02 RX ADMIN — ONDANSETRON 4 MILLIGRAM(S): 8 TABLET, FILM COATED ORAL at 21:50

## 2023-11-02 RX ADMIN — PANTOPRAZOLE SODIUM 40 MILLIGRAM(S): 20 TABLET, DELAYED RELEASE ORAL at 05:02

## 2023-11-02 RX ADMIN — SENNA PLUS 1 TABLET(S): 8.6 TABLET ORAL at 11:30

## 2023-11-02 RX ADMIN — OXYCODONE HYDROCHLORIDE 10 MILLIGRAM(S): 5 TABLET ORAL at 11:25

## 2023-11-02 RX ADMIN — Medication 10 MILLIGRAM(S): at 10:26

## 2023-11-02 RX ADMIN — OXYCODONE HYDROCHLORIDE 5 MILLIGRAM(S): 5 TABLET ORAL at 07:50

## 2023-11-02 RX ADMIN — OXYCODONE HYDROCHLORIDE 5 MILLIGRAM(S): 5 TABLET ORAL at 06:12

## 2023-11-02 RX ADMIN — Medication 650 MILLIGRAM(S): at 06:10

## 2023-11-02 RX ADMIN — Medication 1 MILLIGRAM(S): at 14:43

## 2023-11-02 RX ADMIN — Medication 1 TABLET(S): at 11:25

## 2023-11-02 RX ADMIN — OXYCODONE HYDROCHLORIDE 5 MILLIGRAM(S): 5 TABLET ORAL at 09:40

## 2023-11-02 RX ADMIN — Medication 81 MILLIGRAM(S): at 11:25

## 2023-11-02 RX ADMIN — OXYCODONE HYDROCHLORIDE 10 MILLIGRAM(S): 5 TABLET ORAL at 23:53

## 2023-11-02 RX ADMIN — ARIPIPRAZOLE 5 MILLIGRAM(S): 15 TABLET ORAL at 22:49

## 2023-11-02 RX ADMIN — OXYCODONE HYDROCHLORIDE 10 MILLIGRAM(S): 5 TABLET ORAL at 17:05

## 2023-11-02 RX ADMIN — OXYCODONE HYDROCHLORIDE 10 MILLIGRAM(S): 5 TABLET ORAL at 12:30

## 2023-11-02 RX ADMIN — POLYETHYLENE GLYCOL 3350 17 GRAM(S): 17 POWDER, FOR SOLUTION ORAL at 18:43

## 2023-11-02 RX ADMIN — CHLORHEXIDINE GLUCONATE 1 APPLICATION(S): 213 SOLUTION TOPICAL at 11:29

## 2023-11-02 RX ADMIN — Medication 1 MILLIGRAM(S): at 05:02

## 2023-11-02 NOTE — OCCUPATIONAL THERAPY INITIAL EVALUATION ADULT - PERTINENT HX OF CURRENT PROBLEM, REHAB EVAL
72F pmh GERD, CVA, laryngeal CA s/p trach, hypothyroidism, breast CA presents to ED c/o fatigue iso low sodium on outpatient preop labs.  Patient had mechanical fall on Sunday, was eval at outside hospital, found to have right elbow fracture, and is scheduled to have surgery with outpatient orthopedics from [facility]. Pre-op labs obtained by PMD (Dr Mora) but pt noted to be hyponatremic  so was sent to ED by PMD,  Pt reports hx of  hypoNa 1x in the past and endorses that she drinks ~3L water per day.  Says she started a new BP med 2 wk ago. s/p R olecranon ORIF on 11/1.

## 2023-11-02 NOTE — DISCHARGE NOTE PROVIDER - NSDCCPCAREPLAN_GEN_ALL_CORE_FT
PRINCIPAL DISCHARGE DIAGNOSIS  Diagnosis: Fracture of right elbow  Assessment and Plan of Treatment: You came to the hospital with a fracture of your right olecranon process. You were seen by the orthopedic surgery team who performed surgery to fix the fracture. You remained stable while in the hospital with no surgical complications. You will be discharged with xxx for pain.  Please follow up with your primary care provider and orthopedic surgery within the next 5-7 days.  Please return to the emergency department if your experince severe pain, loss of sensation in your arm, decreased ability to move your arm, chest pain, shortness of breath.      SECONDARY DISCHARGE DIAGNOSES  Diagnosis: Hyponatremia  Assessment and Plan of Treatment: You came to the hospital with hyponatremia. This is when your sodium is too low. Your were fluid restricted and your sodium normalized. The sodium level droped slightly on the day of dicharge. Please follow up with your primary care provider within the next 5-7 days to monitor your sodium.   Please return to the emergency department if you develope altered mental status, loss of conciousness, shorntess of breath, chest pain.     PRINCIPAL DISCHARGE DIAGNOSIS  Diagnosis: Fracture of right elbow  Assessment and Plan of Treatment: You came to the hospital with a fracture of your right olecranon process. You were seen by the orthopedic surgery team who performed surgery to fix the fracture. You remained stable while in the hospital with no surgical complications. You will be discharged with oxycodon 5mg every 6 hours for 5 days for pain.  Please follow up with your primary care provider and orthopedic surgery within the next 5-7 days.  Please return to the emergency department if your experince severe pain, loss of sensation in your arm, decreased ability to move your arm, chest pain, shortness of breath.      SECONDARY DISCHARGE DIAGNOSES  Diagnosis: Hyponatremia  Assessment and Plan of Treatment: You came to the hospital with hyponatremia. This is when your sodium is too low. Your were fluid restricted and your sodium normalized. The sodium level droped slightly on the day of dicharge. Please follow up with your primary care provider within the next 5-7 days to monitor your sodium.   Please return to the emergency department if you develope altered mental status, loss of conciousness, shorntess of breath, chest pain.

## 2023-11-02 NOTE — PROGRESS NOTE ADULT - PROBLEM SELECTOR PLAN 1
S/p mechanical fall 2days ago  - XR Rt arm & elbow: Acute intra-articular fracture through the olecranon process of the ulna  - Prn pain control   - S/p ORIF R olecronon 11/  - Ortho consulted; f/u recs S/p mechanical fall 2days ago  - XR Rt arm & elbow: Acute intra-articular fracture through the olecranon process of the ulna  - Prn pain control   - S/p ORIF R olecranon 11/1  - Ortho consulted; f/u recs  - F/u PT/OT  - Pain regimen: moderate Tylenol q6 moderate, oxy 5 q4 moderate, oxy 10 q4 severe  - Bowel regimen: Senna + Miralax

## 2023-11-02 NOTE — PROGRESS NOTE ADULT - ASSESSMENT
72y F pmh GERD, CVA, laryngeal CA s/p trach, hypothyroidism, breast CA presents to ED c/o fatigue iso low sodium c/b mechanical fall pending surgical intervention being admitted for further management      72y F pmh GERD, CVA, laryngeal CA s/p trach, hypothyroidism, breast CA presents to ED c/o fatigue iso low sodium c/b mechanical fall pending surgical intervention being admitted for further management; s/p R olecranon ORIF 11/1

## 2023-11-02 NOTE — PHYSICAL THERAPY INITIAL EVALUATION ADULT - PERTINENT HX OF CURRENT PROBLEM, REHAB EVAL
72y F pmh GERD, CVA, laryngeal CA s/p trach, hypothyroidism, breast CA presents to ED c/o fatigue iso low sodium on outpatient preop labs.  Patient had mechanical fall on Sunday, was eval at outside hospital, found to have right elbow fracture, and is scheduled to have surgery with outpatient orthopedics from [facility]. Pre-op labs obtained by PMD (Dr Mora) but pt noted to be hyponatremic  so was sent to ED by PMD,  Pt reports hx of  hypoNa 1x in the past and endorses that she drinks ~3L water per day.  Says she started a new BP med 2 wk ago. Now s/p ORIF right olecranon on 11/1.

## 2023-11-02 NOTE — PROGRESS NOTE ADULT - SUBJECTIVE AND OBJECTIVE BOX
Subjective: Patient seen and examined. No new events except as noted.   s/p ORIF, fracture, olecranon  REVIEW OF SYSTEMS:    CONSTITUTIONAL: +weakness, fevers or chills  EYES/ENT: No visual changes;  No vertigo or throat pain   NECK: No pain or stiffness  RESPIRATORY: No cough, wheezing, hemoptysis; No shortness of breath  CARDIOVASCULAR: No chest pain or palpitations  GASTROINTESTINAL: No abdominal or epigastric pain. No nausea, vomiting, or hematemesis; No diarrhea or constipation. No melena or hematochezia.  GENITOURINARY: No dysuria, frequency or hematuria  NEUROLOGICAL: No numbness or weakness  SKIN: No itching, burning, rashes, or lesions   All other review of systems is negative unless indicated above.    MEDICATIONS:  MEDICATIONS  (STANDING):  ARIPiprazole 5 milliGRAM(s) Oral at bedtime  aspirin  chewable 81 milliGRAM(s) Oral daily  atorvastatin 80 milliGRAM(s) Oral at bedtime  chlorhexidine 4% Liquid 1 Application(s) Topical daily  clonazePAM  Tablet 1 milliGRAM(s) Oral three times a day  gabapentin 300 milliGRAM(s) Oral two times a day  lactobacillus acidophilus 1 Tablet(s) Oral daily  levothyroxine 100 MICROGram(s) Oral daily  montelukast 10 milliGRAM(s) Oral daily  mupirocin 2% Nasal 1 Application(s) Both Nostrils two times a day  pantoprazole    Tablet 40 milliGRAM(s) Oral before breakfast  polyethylene glycol 3350 17 Gram(s) Oral two times a day  senna 1 Tablet(s) Oral daily      PHYSICAL EXAM:  T(C): 36.4 (11-02-23 @ 05:00), Max: 36.6 (11-01-23 @ 20:50)  HR: 73 (11-02-23 @ 05:00) (64 - 92)  BP: 162/82 (11-02-23 @ 05:00) (118/52 - 163/76)  RR: 18 (11-02-23 @ 05:00) (14 - 20)  SpO2: 99% (11-02-23 @ 05:00) (93% - 100%)  Wt(kg): --  I&O's Summary    01 Nov 2023 07:01  -  02 Nov 2023 07:00  --------------------------------------------------------  IN: 340 mL / OUT: 0 mL / NET: 340 mL      Height (cm): 160 (11-01 @ 11:54)  Weight (kg): 76.7 (11-01 @ 11:54)  BMI (kg/m2): 30 (11-01 @ 11:54)  BSA (m2): 1.8 (11-01 @ 11:54)    Appearance: NAD + trach  HEENT:   Dry  oral mucosa, PERRL, EOMI	  Lymphatic: No lymphadenopathy  Cardiovascular: Normal S1 S2, No JVD, No murmurs, No edema  Respiratory: decreased bs  Psychiatry: A & O x 3, Mood & affect appropriate  Gastrointestinal:  Soft, Non-tender, + BS	  Skin: No rashes, No ecchymoses, No cyanosis	  Neurologic: Non-focal  Ext: NWB RUE in a posterior slab splint      LABS:    CARDIAC MARKERS:                                11.0   5.26  )-----------( 176      ( 01 Nov 2023 02:59 )             32.0     11-01    135  |  99  |  9   ----------------------------<  100<H>  4.6   |  25  |  0.60    Ca    9.6      01 Nov 2023 02:59    TPro  6.8  /  Alb  3.9  /  TBili  0.3  /  DBili  x   /  AST  48<H>  /  ALT  45  /  AlkPhos  91  10-31      TELEMETRY: 	SR    ECG:  	  RADIOLOGY:   DIAGNOSTIC TESTING:  [ ] Echocardiogram:  [ ]  Catheterization:  [ ] Stress Test:    OTHER:

## 2023-11-02 NOTE — PROGRESS NOTE ADULT - SUBJECTIVE AND OBJECTIVE BOX
SUBJECTIVE: Pt seen and examined at bedside. Patient is doing well, no acute complaints this AM. Pain is partially controlled with medication    LABS:                        11.0   5.26  )-----------( 176      ( 2023 02:59 )             32.0     11    135  |  99  |  9   ----------------------------<  100<H>  4.6   |  25  |  0.60    Ca    9.6      2023 02:59    TPro  6.8  /  Alb  3.9  /  TBili  0.3  /  DBili  x   /  AST  48<H>  /  ALT  45  /  AlkPhos  91  10-    PT/INR - ( 2023 02:59 )   PT: 10.6 sec;   INR: 0.96 ratio         PTT - ( 2023 02:59 )  PTT:32.1 sec  Urinalysis Basic - ( 2023 04:39 )    Color: Yellow / Appearance: Clear / S.005 / pH: x  Gluc: x / Ketone: Negative mg/dL  / Bili: Negative / Urobili: 1.0 mg/dL   Blood: x / Protein: Negative mg/dL / Nitrite: Negative   Leuk Esterase: Trace / RBC: 2 /HPF / WBC 1 /HPF   Sq Epi: x / Non Sq Epi: 1 /HPF / Bacteria: Negative /HPF        VITAL SIGNS:  T(C): 36.4 (23 @ 05:00), Max: 36.6 (23 @ 20:50)  HR: 73 (23 @ 05:00) (64 - 92)  BP: 162/82 (23 @ 05:00) (118/52 - 163/76)  RR: 18 (23 @ 05:00) (14 - 20)  SpO2: 99% (23 @ 05:00) (93% - 100%)    Physical Examination:  GEN: NAD, resting quietly  PULM: symmetric chest rise bilaterally, no increased WOB  ABD: nondistended  EXTR:   RUE:  RUE in PS splint  Limited elbow ROM 2/2 pain  Motor: Musc/Med/Rad/AIN/PIN/U intact  Sensory: Musc/Med/Rad/U SILT  +Rad pulse, WWP

## 2023-11-02 NOTE — PROGRESS NOTE ADULT - PROBLEM SELECTOR PLAN 4
c/w Levothyroxine   - F/u TSH BP stable off meds, will hold for now  - If BP becomes uncontrolled can start Olmesartan 20mg

## 2023-11-02 NOTE — PROGRESS NOTE ADULT - PROBLEM SELECTOR PLAN 2
HypoNa on O/P labs-- Na 125  - Endorses new BP med (HTZ)  was added to her regimen apx 2wk ago.  - Was previously only taking Olmesartan.   - Suspect addition of HTZ likely contributed to electrolyte disturbance   - EKG NSR    - F/u Urine studies  - Fluid restriction    - Trend labs  - Na now 135 11/1 Pt w/ no BM x3 days.  - Start miralax BID  - Start senna qd  - Dulcolax suppository Pt originally admitted after hypona found on outpt labs to 125. Was 135 day after admission. Now decreased to 132.  - Fluid restrict to 1500ml

## 2023-11-02 NOTE — DISCHARGE NOTE PROVIDER - NSDCCPTREATMENT_GEN_ALL_CORE_FT
PRINCIPAL PROCEDURE  Procedure: XR elbow right, 2 views  Findings and Treatment: IMPRESSION:  Acute, displaced intra-articular fracture through the olecranon process   of the ulna. 11mm posterior offset of the olecranon relative to the ulnar   shaft. Associated elbow joint effusion.  There is splint material spanning from the mid humerus to the hand.  Mild arthrosis of the basilar joint, STT joint, radiocarpal joint, and of   the acromioclavicular joint.  Surgical clips noted in the right axilla.

## 2023-11-02 NOTE — OCCUPATIONAL THERAPY INITIAL EVALUATION ADULT - LIVES WITH, PROFILE
Pt lives in  with spouse +2 EDILSON. Pt reports independence with all aspects of self care and functional mobility without AD PTA.

## 2023-11-02 NOTE — DISCHARGE NOTE PROVIDER - NSDCFUADDAPPT_GEN_ALL_CORE_FT
APPTS ARE READY TO BE MADE: [x] YES    Best Family or Patient Contact (if needed):    Additional Information about above appointments (if needed):    1: F/u w/ pcp in 5-7 days  2: F/u w/ orho in 5-7 days   3:     Other comments or requests:    APPTS ARE READY TO BE MADE: [x] YES    Best Family or Patient Contact (if needed):    Additional Information about above appointments (if needed):    1: F/u w/ pcp in 5-7 days  2: F/u w/ orho in 5-7 days   3:     Other comments or requests:   Patient was previously scheduled for an appointment on 11/16 9:45a at [address] [place] [address] with Dr. Park.  Patient/Caregiver was provided with follow up request details and prefers to call the providers office on their own to schedule.

## 2023-11-02 NOTE — DISCHARGE NOTE PROVIDER - CARE PROVIDER_API CALL
Last, Zane Awad  Internal Medicine  [address] [place] [address]  Phone: [phone]  Fax: [phone]  Follow Up Time:     Yadiel Park  Orthopaedic Surgery  [address] [facility] [address]  [place] [address]  Phone: [phone]  Fax: [phone]  Follow Up Time:

## 2023-11-02 NOTE — PROGRESS NOTE ADULT - SUBJECTIVE AND OBJECTIVE BOX
*******************************  Clifton Urban MD (PGY-1)  Internal Medicine  Contact via [employer] TEAMS  *******************************    PROGRESS NOTE:     Patient is a 72y old  Female who presents with a chief complaint of Abnormal labs, elbow fx (2023 06:39)      INTERVAL EVENTS: No acute overnight events.     SUBJECTIVE: Patient seen and examined at bedside. This morning, the patient is comfortable and doing well. No acute complaints. Denies fevers, chills, N/V/D, chest pain, SOB, abdominal pain.    MEDICATIONS  (STANDING):  ARIPiprazole 5 milliGRAM(s) Oral at bedtime  aspirin  chewable 81 milliGRAM(s) Oral daily  atorvastatin 80 milliGRAM(s) Oral at bedtime  chlorhexidine 4% Liquid 1 Application(s) Topical daily  clonazePAM  Tablet 1 milliGRAM(s) Oral three times a day  gabapentin 300 milliGRAM(s) Oral two times a day  lactobacillus acidophilus 1 Tablet(s) Oral daily  levothyroxine 100 MICROGram(s) Oral daily  montelukast 10 milliGRAM(s) Oral daily  pantoprazole    Tablet 40 milliGRAM(s) Oral before breakfast    MEDICATIONS  (PRN):  acetaminophen     Tablet .. 650 milliGRAM(s) Oral every 6 hours PRN Temp greater or equal to 38C (100.4F), Mild Pain (1 - 3)  albuterol    90 MICROgram(s) HFA Inhaler 2 Puff(s) Inhalation every 6 hours PRN Shortness of Breath and/or Wheezing  aluminum hydroxide/magnesium hydroxide/simethicone Suspension 30 milliLiter(s) Oral every 4 hours PRN Dyspepsia  melatonin 3 milliGRAM(s) Oral at bedtime PRN Insomnia  ondansetron Injectable 4 milliGRAM(s) IV Push every 8 hours PRN Nausea and/or Vomiting  oxyCODONE    IR 10 milliGRAM(s) Oral every 4 hours PRN Severe Pain (7 - 10)  oxyCODONE    IR 5 milliGRAM(s) Oral every 4 hours PRN Moderate Pain (4 - 6)      CAPILLARY BLOOD GLUCOSE        I&O's Summary    2023 07:01  -  2023 06:47  --------------------------------------------------------  IN: 340 mL / OUT: 0 mL / NET: 340 mL        PHYSICAL EXAM:  Vital Signs Last 24 Hrs  T(C): 36.4 (2023 05:00), Max: 36.6 (2023 20:50)  T(F): 97.5 (2023 05:00), Max: 97.9 (2023 20:50)  HR: 73 (2023 05:00) (64 - 92)  BP: 162/82 (2023 05:00) (118/52 - 163/76)  BP(mean): 92 (2023 13:15) (82 - 109)  RR: 18 (2023 05:00) (14 - 20)  SpO2: 99% (2023 05:00) (93% - 100%)    Parameters below as of 2023 05:00  Patient On (Oxygen Delivery Method): room air        GENERAL: NAD, lying in bed comfortably  HEAD: Atraumatic, normocephalic  EYES: EOMI, PERRLA  ENT: Moist mucous membranes  NECK: Supple, no JVD  HEART: S1, S2, Regular rate and rhythm, no murmurs, rubs, or gallops  LUNGS: Unlabored respirations, clear to auscultation bilaterally, no crackles, wheezing, or rhonchi  ABDOMEN: Soft, nontender, nondistended, +BS  EXTREMITIES: No clubbing, cyanosis, or edema  NERVOUS SYSTEM:  A&Ox3, no focal deficits   SKIN: No rashes or lesions    LABS:                        11.0   5.26  )-----------( 176      ( 2023 02:59 )             32.0     11-    135  |  99  |  9   ----------------------------<  100<H>  4.6   |  25  |  0.60    Ca    9.6      2023 02:59    TPro  6.8  /  Alb  3.9  /  TBili  0.3  /  DBili  x   /  AST  48<H>  /  ALT  45  /  AlkPhos  91  10-31    PT/INR - ( 2023 02:59 )   PT: 10.6 sec;   INR: 0.96 ratio         PTT - ( 2023 02:59 )  PTT:32.1 sec      Urinalysis Basic - ( 2023 04:39 )    Color: Yellow / Appearance: Clear / S.005 / pH: x  Gluc: x / Ketone: Negative mg/dL  / Bili: Negative / Urobili: 1.0 mg/dL   Blood: x / Protein: Negative mg/dL / Nitrite: Negative   Leuk Esterase: Trace / RBC: 2 /HPF / WBC 1 /HPF   Sq Epi: x / Non Sq Epi: 1 /HPF / Bacteria: Negative /HPF          RADIOLOGY & ADDITIONAL TESTS:  Results Reviewed:   Imaging Personally Reviewed:  Electrocardiogram Personally Reviewed:  Tele: *******************************  Clifton Urban MD (PGY-1)  Internal Medicine  Contact via [employer] TEAMS  *******************************    PROGRESS NOTE:     Patient is a 72y old  Female who presents with a chief complaint of Abnormal labs, elbow fx (2023 06:39)      INTERVAL EVENTS: No acute overnight events.     SUBJECTIVE: Patient seen and examined at bedside. 10/10 R elbow pain this AM. No sensory changes r arm/hand. No BM 3x days. Still passing flatus, no abd pain, N/V. Denies fevers, chills, D, chest pain, SOB.    MEDICATIONS  (STANDING):  ARIPiprazole 5 milliGRAM(s) Oral at bedtime  aspirin  chewable 81 milliGRAM(s) Oral daily  atorvastatin 80 milliGRAM(s) Oral at bedtime  chlorhexidine 4% Liquid 1 Application(s) Topical daily  clonazePAM  Tablet 1 milliGRAM(s) Oral three times a day  gabapentin 300 milliGRAM(s) Oral two times a day  lactobacillus acidophilus 1 Tablet(s) Oral daily  levothyroxine 100 MICROGram(s) Oral daily  montelukast 10 milliGRAM(s) Oral daily  pantoprazole    Tablet 40 milliGRAM(s) Oral before breakfast    MEDICATIONS  (PRN):  acetaminophen     Tablet .. 650 milliGRAM(s) Oral every 6 hours PRN Temp greater or equal to 38C (100.4F), Mild Pain (1 - 3)  albuterol    90 MICROgram(s) HFA Inhaler 2 Puff(s) Inhalation every 6 hours PRN Shortness of Breath and/or Wheezing  aluminum hydroxide/magnesium hydroxide/simethicone Suspension 30 milliLiter(s) Oral every 4 hours PRN Dyspepsia  melatonin 3 milliGRAM(s) Oral at bedtime PRN Insomnia  ondansetron Injectable 4 milliGRAM(s) IV Push every 8 hours PRN Nausea and/or Vomiting  oxyCODONE    IR 10 milliGRAM(s) Oral every 4 hours PRN Severe Pain (7 - 10)  oxyCODONE    IR 5 milliGRAM(s) Oral every 4 hours PRN Moderate Pain (4 - 6)      CAPILLARY BLOOD GLUCOSE        I&O's Summary    2023 07:01  -  2023 06:47  --------------------------------------------------------  IN: 340 mL / OUT: 0 mL / NET: 340 mL        PHYSICAL EXAM:  Vital Signs Last 24 Hrs  T(C): 36.4 (2023 05:00), Max: 36.6 (2023 20:50)  T(F): 97.5 (2023 05:00), Max: 97.9 (2023 20:50)  HR: 73 (2023 05:00) (64 - 92)  BP: 162/82 (2023 05:00) (118/52 - 163/76)  BP(mean): 92 (2023 13:15) (82 - 109)  RR: 18 (2023 05:00) (14 - 20)  SpO2: 99% (2023 05:00) (93% - 100%)    Parameters below as of 2023 05:00  Patient On (Oxygen Delivery Method): room air        GENERAL: NAD, lying in bed comfortably  HEAD: Atraumatic, normocephalic  HEART: Systolic murmu  LUNGS: Unlabored respirations, clear to auscultation bilaterally, no crackles, wheezing, or rhonchi  ABDOMEN: Soft, nontender, nondistended, +BS  EXTREMITIES: No edema; strength slightly decreased in R hand; no R hand/arm sensory changes  NERVOUS SYSTEM:  A&Ox3      LABS:                        11.0   5.26  )-----------( 176      ( 2023 02:59 )             32.0     11-01    135  |  99  |  9   ----------------------------<  100<H>  4.6   |  25  |  0.60    Ca    9.6      2023 02:59    TPro  6.8  /  Alb  3.9  /  TBili  0.3  /  DBili  x   /  AST  48<H>  /  ALT  45  /  AlkPhos  91  10-31    PT/INR - ( 2023 02:59 )   PT: 10.6 sec;   INR: 0.96 ratio         PTT - ( 2023 02:59 )  PTT:32.1 sec      Urinalysis Basic - ( 2023 04:39 )    Color: Yellow / Appearance: Clear / S.005 / pH: x  Gluc: x / Ketone: Negative mg/dL  / Bili: Negative / Urobili: 1.0 mg/dL   Blood: x / Protein: Negative mg/dL / Nitrite: Negative   Leuk Esterase: Trace / RBC: 2 /HPF / WBC 1 /HPF   Sq Epi: x / Non Sq Epi: 1 /HPF / Bacteria: Negative /HPF          RADIOLOGY & ADDITIONAL TESTS:  Results Reviewed:   Imaging Personally Reviewed:  Electrocardiogram Personally Reviewed:  Tele: *******************************  Clifton Urban MD (PGY-1)  Internal Medicine  Contact via [employer] TEAMS  *******************************    PROGRESS NOTE:     Patient is a 72y old  Female who presents with a chief complaint of Abnormal labs, elbow fx (2023 06:39)      INTERVAL EVENTS: No acute overnight events.     SUBJECTIVE: Patient seen and examined at bedside. 10/10 R elbow pain this AM. No sensory changes r arm/hand. No BM 3x days. Still passing flatus, no abd pain, N/V. Denies fevers, chills, D, chest pain, SOB.    MEDICATIONS  (STANDING):  ARIPiprazole 5 milliGRAM(s) Oral at bedtime  aspirin  chewable 81 milliGRAM(s) Oral daily  atorvastatin 80 milliGRAM(s) Oral at bedtime  chlorhexidine 4% Liquid 1 Application(s) Topical daily  clonazePAM  Tablet 1 milliGRAM(s) Oral three times a day  gabapentin 300 milliGRAM(s) Oral two times a day  lactobacillus acidophilus 1 Tablet(s) Oral daily  levothyroxine 100 MICROGram(s) Oral daily  montelukast 10 milliGRAM(s) Oral daily  pantoprazole    Tablet 40 milliGRAM(s) Oral before breakfast    MEDICATIONS  (PRN):  acetaminophen     Tablet .. 650 milliGRAM(s) Oral every 6 hours PRN Temp greater or equal to 38C (100.4F), Mild Pain (1 - 3)  albuterol    90 MICROgram(s) HFA Inhaler 2 Puff(s) Inhalation every 6 hours PRN Shortness of Breath and/or Wheezing  aluminum hydroxide/magnesium hydroxide/simethicone Suspension 30 milliLiter(s) Oral every 4 hours PRN Dyspepsia  melatonin 3 milliGRAM(s) Oral at bedtime PRN Insomnia  ondansetron Injectable 4 milliGRAM(s) IV Push every 8 hours PRN Nausea and/or Vomiting  oxyCODONE    IR 10 milliGRAM(s) Oral every 4 hours PRN Severe Pain (7 - 10)  oxyCODONE    IR 5 milliGRAM(s) Oral every 4 hours PRN Moderate Pain (4 - 6)      CAPILLARY BLOOD GLUCOSE        I&O's Summary    2023 07:01  -  2023 06:47  --------------------------------------------------------  IN: 340 mL / OUT: 0 mL / NET: 340 mL        PHYSICAL EXAM:  Vital Signs Last 24 Hrs  T(C): 36.4 (2023 05:00), Max: 36.6 (2023 20:50)  T(F): 97.5 (2023 05:00), Max: 97.9 (2023 20:50)  HR: 73 (2023 05:00) (64 - 92)  BP: 162/82 (2023 05:00) (118/52 - 163/76)  BP(mean): 92 (2023 13:15) (82 - 109)  RR: 18 (2023 05:00) (14 - 20)  SpO2: 99% (2023 05:00) (93% - 100%)    Parameters below as of 2023 05:00  Patient On (Oxygen Delivery Method): room air        GENERAL: NAD, lying in bed comfortably  HEAD: Atraumatic, normocephalic  HEART: Systolic murmur  LUNGS: Unlabored respirations, clear to auscultation bilaterally, no crackles, wheezing, or rhonchi  ABDOMEN: Soft, nontender, nondistended, +BS  EXTREMITIES: No edema; strength slightly decreased in R hand; no R hand/arm sensory changes; 2+ peripheral pulses  NERVOUS SYSTEM:  A&Ox3      LABS:                        11.0   5.26  )-----------( 176      ( 2023 02:59 )             32.0     11-01    135  |  99  |  9   ----------------------------<  100<H>  4.6   |  25  |  0.60    Ca    9.6      2023 02:59    TPro  6.8  /  Alb  3.9  /  TBili  0.3  /  DBili  x   /  AST  48<H>  /  ALT  45  /  AlkPhos  91  10-31    PT/INR - ( 2023 02:59 )   PT: 10.6 sec;   INR: 0.96 ratio         PTT - ( 2023 02:59 )  PTT:32.1 sec      Urinalysis Basic - ( 2023 04:39 )    Color: Yellow / Appearance: Clear / S.005 / pH: x  Gluc: x / Ketone: Negative mg/dL  / Bili: Negative / Urobili: 1.0 mg/dL   Blood: x / Protein: Negative mg/dL / Nitrite: Negative   Leuk Esterase: Trace / RBC: 2 /HPF / WBC 1 /HPF   Sq Epi: x / Non Sq Epi: 1 /HPF / Bacteria: Negative /HPF          RADIOLOGY & ADDITIONAL TESTS:  Results Reviewed:   Imaging Personally Reviewed:  Electrocardiogram Personally Reviewed:  Tele:

## 2023-11-02 NOTE — DISCHARGE NOTE PROVIDER - CARE PROVIDERS DIRECT ADDRESSES
[email or web address] ,[email or web address]

## 2023-11-02 NOTE — DISCHARGE NOTE PROVIDER - NSDCMRMEDTOKEN_GEN_ALL_CORE_FT
Acidophilus oral tablet: 1 tab(s) orally once a day  albuterol 90 mcg/inh inhalation aerosol: 2 puff(s) inhaled every 6 hours As needed Shortness of Breath and/or Wheezing  ARIPiprazole 5 mg oral tablet: 1 tab(s) orally once a day (at bedtime)  aspirin 81 mg oral tablet, chewable: 1 tab(s) orally once a day  atorvastatin 80 mg oral tablet: 1 tab(s) orally once a day (at bedtime)  biotin: 50496 mcg orally once a day  buPROPion 75 mg oral tablet: 1 orally once a day  clonazePAM 1 mg oral tablet: 1 tab(s) orally 3 times a day  doxepin: 50 milligram(s) orally once a day (at bedtime)  gabapentin 300 mg oral capsule: 1 cap(s) orally 2 times a day  levothyroxine 100 mcg (0.1 mg) oral tablet: 1 tab(s) orally once a day  methylphenidate 27 mg/24 hr oral tablet, extended release: 1 orally once a day  montelukast 10 mg oral tablet: 1 tab(s) orally once a day  olmesartan-hydrochlorothiazide 20 mg-12.5 mg oral tablet: 1 tab(s) orally once a day  omeprazole 40 mg oral delayed release capsule: 1 cap(s) orally once a day   Viactive dietary supplement: 1 chewable tab orally once a day LD 10/22/2023   Acidophilus oral tablet: 1 tab(s) orally once a day  albuterol 90 mcg/inh inhalation aerosol: 2 puff(s) inhaled every 6 hours As needed Shortness of Breath and/or Wheezing  ARIPiprazole 5 mg oral tablet: 1 tab(s) orally once a day (at bedtime)  aspirin 81 mg oral tablet, chewable: 1 tab(s) orally once a day  atorvastatin 80 mg oral tablet: 1 tab(s) orally once a day (at bedtime)  biotin: 07911 mcg orally once a day  buPROPion 75 mg oral tablet: 1 orally once a day  clonazePAM 1 mg oral tablet: 1 tab(s) orally 3 times a day  doxepin: 50 milligram(s) orally once a day (at bedtime)  gabapentin 300 mg oral capsule: 1 cap(s) orally 2 times a day  levothyroxine 100 mcg (0.1 mg) oral tablet: 1 tab(s) orally once a day  methylphenidate 27 mg/24 hr oral tablet, extended release: 1 orally once a day  montelukast 10 mg oral tablet: 1 tab(s) orally once a day  omeprazole 40 mg oral delayed release capsule: 1 cap(s) orally once a day   Outpatient Physical Therapy: Patient requires outpatient physical therapy due to her diagnosis of ICD10 S52.0  Viactive dietary supplement: 1 chewable tab orally once a day LD 10/22/2023   Acidophilus oral tablet: 1 tab(s) orally once a day  albuterol 90 mcg/inh inhalation aerosol: 2 puff(s) inhaled every 6 hours As needed Shortness of Breath and/or Wheezing  ARIPiprazole 5 mg oral tablet: 1 tab(s) orally once a day (at bedtime)  aspirin 81 mg oral tablet, chewable: 1 tab(s) orally once a day  atorvastatin 80 mg oral tablet: 1 tab(s) orally once a day (at bedtime)  biotin: 44464 mcg orally once a day  buPROPion 75 mg oral tablet: 1 orally once a day  clonazePAM 1 mg oral tablet: 1 tab(s) orally 3 times a day  doxepin: 50 milligram(s) orally once a day (at bedtime)  gabapentin 300 mg oral capsule: 1 cap(s) orally 2 times a day  levothyroxine 100 mcg (0.1 mg) oral tablet: 1 tab(s) orally once a day  methylphenidate 27 mg/24 hr oral tablet, extended release: 1 orally once a day  montelukast 10 mg oral tablet: 1 tab(s) orally once a day  naloxone 8 mg/0.1 mL nasal spray: 8 milligram(s) intranasally Use for sedation or respiratory depression  olmesartan 20 mg oral tablet: 1 tab(s) orally once a day  omeprazole 40 mg oral delayed release capsule: 1 cap(s) orally once a day   Outpatient Physical Therapy: Patient requires outpatient physical therapy due to her diagnosis of ICD10 S52.0  oxyCODONE 5 mg oral tablet: 1 tab(s) orally every 6 hours Avoid heavy lifting or operating machinary while on this medication. MDD: 4 tabs  Viactive dietary supplement: 1 chewable tab orally once a day LD 10/22/2023

## 2023-11-02 NOTE — PROGRESS NOTE ADULT - PROBLEM SELECTOR PLAN 6
- DVT ppx: Heparin SQ   - GI ppx: PPI   - Diet: DASH >> NPO MN for possible procedure   - PT consult - DVT ppx: Heparin SQ   - GI ppx: PPI   - Diet: DASH - c/w statin, ASA

## 2023-11-02 NOTE — DISCHARGE NOTE PROVIDER - NSDCFUSCHEDAPPT_GEN_ALL_CORE_FT
Yadiel Park  [facility]  ORTHOSURG [address] [facility]  Scheduled Appointment: 11/16/2023

## 2023-11-02 NOTE — DISCHARGE NOTE PROVIDER - HOSPITAL COURSE
Discharge Summary     Discharge diagnoses:   R olecranon fracture     Hospital Course:   For full details, please see H&P, progress notes, consult notes and ancillary notes. Briefly, HPI:  72y F pmh GERD, CVA, laryngeal CA s/p trach, hypothyroidism, breast CA presents to ED c/o fatigue iso low sodium on outpatient preop labs.  Patient had mechanical fall on Sunday, was eval at outside hospital, found to have right elbow fracture, and is scheduled to have surgery with outpatient orthopedics from [facility]. Pre-op labs obtained by PMD (Dr Mora) but pt noted to be hyponatremic  so was sent to ED by PMD,  Pt reports hx of  hypoNa 1x in the past and endorses that she drinks ~3L water per day.  Says she started a new BP med 2 wk ago.       ROS: Denies CP, SOB, palpitation, N/V/D, fever, cough, chills, dizziness, abm pain, recent travel, sick contact, change in bowel and urinary habits     A 10-system ROS was performed and is negative except as noted above and/or in the HPI.    ED: Labs and imaging obtained. Ortho also was consulted by ED  (31 Oct 2023 20:29)  .     The patient's hospital course will be summarized in a problem based format.    # R elbow frx  Fracture of right elbow. S/p mechanical fall 2days prior to admission. XR Rt arm & elbow: Acute intra-articular fracture through the olecranon process of the ulna. Underwent ORIF R olecranon w/ ortho. Was seen by PT and OT. Recommended home PT. Pt to be dced w/     # Hyponatremia  Pt originally admitted after hypona found on outpt labs to 125. Was 135 day after admission. Now decreased to 132. Pt to follow w/ PCP to ensure Na not continuing to fall.     On day of discharge, patient is clinically stable with no new exam findings or acute symptoms compared to prior. The patient was seen by the attending physician on the date of discharge and deemed stable and acceptable for discharge.     Discharge follow up action items:     1) F/u w/ pcp in 5-7 days  2) F/u w/ ortho in 5-7 days     Discharge Summary     Discharge diagnoses:   R olecranon fracture     Hospital Course:   For full details, please see H&P, progress notes, consult notes and ancillary notes. Briefly, HPI:  72y F pmh GERD, CVA, laryngeal CA s/p trach, hypothyroidism, breast CA presents to ED c/o fatigue iso low sodium on outpatient preop labs.  Patient had mechanical fall on Sunday, was eval at outside hospital, found to have right elbow fracture, and is scheduled to have surgery with outpatient orthopedics from [facility]. Pre-op labs obtained by PMD (Dr Mora) but pt noted to be hyponatremic  so was sent to ED by PMD,  Pt reports hx of  hypoNa 1x in the past and endorses that she drinks ~3L water per day.  Says she started a new BP med 2 wk ago.       ROS: Denies CP, SOB, palpitation, N/V/D, fever, cough, chills, dizziness, abm pain, recent travel, sick contact, change in bowel and urinary habits     A 10-system ROS was performed and is negative except as noted above and/or in the HPI.    ED: Labs and imaging obtained. Ortho also was consulted by ED  (31 Oct 2023 20:29)  .     The patient's hospital course will be summarized in a problem based format.    # R elbow frx  Fracture of right elbow. S/p mechanical fall 2days prior to admission. XR Rt arm & elbow: Acute intra-articular fracture through the olecranon process of the ulna. Underwent ORIF R olecranon w/ ortho. Was seen by PT and OT. Recommended home PT. Pt to be dced w/     # Hyponatremia  Pt originally admitted after hypona found on outpt labs to 125. Was 135 day after admission. 138 on day of D/C. Pt to follow w/ PCP to ensure Na not continuing to fall.     On day of discharge, patient is clinically stable with no new exam findings or acute symptoms compared to prior. The patient was seen by the attending physician on the date of discharge and deemed stable and acceptable for discharge.     Discharge follow up action items:     1) F/u w/ pcp in 5-7 days  2) F/u w/ ortho in 5-7 days     Discharge Summary     Discharge diagnoses:   R olecranon fracture     Hospital Course:   For full details, please see H&P, progress notes, consult notes and ancillary notes. Briefly, HPI:  72y F pmh GERD, CVA, laryngeal CA s/p trach, hypothyroidism, breast CA presents to ED c/o fatigue iso low sodium on outpatient preop labs.  Patient had mechanical fall on Sunday, was eval at outside hospital, found to have right elbow fracture, and is scheduled to have surgery with outpatient orthopedics from [facility]. Pre-op labs obtained by PMD (Dr Mora) but pt noted to be hyponatremic  so was sent to ED by PMD,  Pt reports hx of  hypoNa 1x in the past and endorses that she drinks ~3L water per day.  Says she started a new BP med 2 wk ago.       ROS: Denies CP, SOB, palpitation, N/V/D, fever, cough, chills, dizziness, abm pain, recent travel, sick contact, change in bowel and urinary habits     A 10-system ROS was performed and is negative except as noted above and/or in the HPI.    ED: Labs and imaging obtained. Ortho also was consulted by ED  (31 Oct 2023 20:29)  .     The patient's hospital course will be summarized in a problem based format.    # R elbow frx  Fracture of right elbow. S/p mechanical fall 2days prior to admission. XR Rt arm & elbow: Acute intra-articular fracture through the olecranon process of the ulna. Underwent ORIF R olecranon w/ ortho. Was seen by PT and OT. Recommended home PT. Pt to be dced w/ oxy 5mg q6h pain for 5x days    # Hyponatremia  Pt originally admitted after hypona found on outpt labs to 125. Was 135 day after admission. 138 on day of D/C. Pt to follow w/ PCP to ensure Na not continuing to fall. Will dc pts home olmesartan-hctz as hctz likely contributing to hypona.     # HTN  Pts home antihypertensives were held as bp soft on admission. Stabilized and became hypertensive so was started on losartan. BP stabilized. Pt to be dced on olmesartan 20mg qd.     On day of discharge, patient is clinically stable with no new exam findings or acute symptoms compared to prior. The patient was seen by the attending physician on the date of discharge and deemed stable and acceptable for discharge.     Discharge follow up action items:     1) F/u w/ pcp in 5-7 days  2) F/u w/ ortho in 5-7 days

## 2023-11-02 NOTE — PROGRESS NOTE ADULT - ASSESSMENT
ASSESSMENT & PLAN  72yFemale w/ R olecranon fx s/p ORIF POD1  -NWB RUE in a posterior slab splint  -splint precautions  -pain control  -ice/cold compress, elevation  -Ambulate with PT/OT then stable for dispo for home

## 2023-11-02 NOTE — PHYSICAL THERAPY INITIAL EVALUATION ADULT - ADDITIONAL COMMENTS
Pt lives with her  in a private home, 3 steps to enter with bilateral handrails. Pt was independent with all functional mobility and ADLs prior to admission without AD.

## 2023-11-02 NOTE — PROGRESS NOTE ADULT - PROBLEM SELECTOR PLAN 3
BP stable off meds, will hold for now  - If BP becomes uncontrolled can start Olmesartan 20mg Pt w/ no BM x3 days.  - Start miralax BID  - Start senna qd  - Dulcolax suppository

## 2023-11-03 ENCOUNTER — TRANSCRIPTION ENCOUNTER (OUTPATIENT)
Age: 73
End: 2023-11-03

## 2023-11-03 VITALS
SYSTOLIC BLOOD PRESSURE: 140 MMHG | RESPIRATION RATE: 18 BRPM | OXYGEN SATURATION: 96 % | HEART RATE: 93 BPM | TEMPERATURE: 98 F | DIASTOLIC BLOOD PRESSURE: 69 MMHG

## 2023-11-03 LAB
ALBUMIN SERPL ELPH-MCNC: 3.9 G/DL — SIGNIFICANT CHANGE UP (ref 3.3–5)
ALBUMIN SERPL ELPH-MCNC: 3.9 G/DL — SIGNIFICANT CHANGE UP (ref 3.3–5)
ALP SERPL-CCNC: 79 U/L — SIGNIFICANT CHANGE UP (ref 40–120)
ALP SERPL-CCNC: 79 U/L — SIGNIFICANT CHANGE UP (ref 40–120)
ALT FLD-CCNC: 30 U/L — SIGNIFICANT CHANGE UP (ref 10–45)
ALT FLD-CCNC: 30 U/L — SIGNIFICANT CHANGE UP (ref 10–45)
ANION GAP SERPL CALC-SCNC: 11 MMOL/L — SIGNIFICANT CHANGE UP (ref 5–17)
ANION GAP SERPL CALC-SCNC: 11 MMOL/L — SIGNIFICANT CHANGE UP (ref 5–17)
AST SERPL-CCNC: 26 U/L — SIGNIFICANT CHANGE UP (ref 10–40)
AST SERPL-CCNC: 26 U/L — SIGNIFICANT CHANGE UP (ref 10–40)
BASOPHILS # BLD AUTO: 0.04 K/UL — SIGNIFICANT CHANGE UP (ref 0–0.2)
BASOPHILS # BLD AUTO: 0.04 K/UL — SIGNIFICANT CHANGE UP (ref 0–0.2)
BASOPHILS NFR BLD AUTO: 0.7 % — SIGNIFICANT CHANGE UP (ref 0–2)
BASOPHILS NFR BLD AUTO: 0.7 % — SIGNIFICANT CHANGE UP (ref 0–2)
BILIRUB SERPL-MCNC: 0.4 MG/DL — SIGNIFICANT CHANGE UP (ref 0.2–1.2)
BILIRUB SERPL-MCNC: 0.4 MG/DL — SIGNIFICANT CHANGE UP (ref 0.2–1.2)
BUN SERPL-MCNC: 11 MG/DL — SIGNIFICANT CHANGE UP (ref 7–23)
BUN SERPL-MCNC: 11 MG/DL — SIGNIFICANT CHANGE UP (ref 7–23)
CALCIUM SERPL-MCNC: 9.4 MG/DL — SIGNIFICANT CHANGE UP (ref 8.4–10.5)
CALCIUM SERPL-MCNC: 9.4 MG/DL — SIGNIFICANT CHANGE UP (ref 8.4–10.5)
CHLORIDE SERPL-SCNC: 102 MMOL/L — SIGNIFICANT CHANGE UP (ref 96–108)
CHLORIDE SERPL-SCNC: 102 MMOL/L — SIGNIFICANT CHANGE UP (ref 96–108)
CO2 SERPL-SCNC: 26 MMOL/L — SIGNIFICANT CHANGE UP (ref 22–31)
CO2 SERPL-SCNC: 26 MMOL/L — SIGNIFICANT CHANGE UP (ref 22–31)
CREAT SERPL-MCNC: 0.68 MG/DL — SIGNIFICANT CHANGE UP (ref 0.5–1.3)
CREAT SERPL-MCNC: 0.68 MG/DL — SIGNIFICANT CHANGE UP (ref 0.5–1.3)
EGFR: 93 ML/MIN/1.73M2 — SIGNIFICANT CHANGE UP
EGFR: 93 ML/MIN/1.73M2 — SIGNIFICANT CHANGE UP
EOSINOPHIL # BLD AUTO: 0.19 K/UL — SIGNIFICANT CHANGE UP (ref 0–0.5)
EOSINOPHIL # BLD AUTO: 0.19 K/UL — SIGNIFICANT CHANGE UP (ref 0–0.5)
EOSINOPHIL NFR BLD AUTO: 3.3 % — SIGNIFICANT CHANGE UP (ref 0–6)
EOSINOPHIL NFR BLD AUTO: 3.3 % — SIGNIFICANT CHANGE UP (ref 0–6)
GLUCOSE SERPL-MCNC: 104 MG/DL — HIGH (ref 70–99)
GLUCOSE SERPL-MCNC: 104 MG/DL — HIGH (ref 70–99)
HCT VFR BLD CALC: 31.4 % — LOW (ref 34.5–45)
HCT VFR BLD CALC: 31.4 % — LOW (ref 34.5–45)
HGB BLD-MCNC: 10.6 G/DL — LOW (ref 11.5–15.5)
HGB BLD-MCNC: 10.6 G/DL — LOW (ref 11.5–15.5)
IMM GRANULOCYTES NFR BLD AUTO: 0.2 % — SIGNIFICANT CHANGE UP (ref 0–0.9)
IMM GRANULOCYTES NFR BLD AUTO: 0.2 % — SIGNIFICANT CHANGE UP (ref 0–0.9)
LYMPHOCYTES # BLD AUTO: 1.48 K/UL — SIGNIFICANT CHANGE UP (ref 1–3.3)
LYMPHOCYTES # BLD AUTO: 1.48 K/UL — SIGNIFICANT CHANGE UP (ref 1–3.3)
LYMPHOCYTES # BLD AUTO: 25.7 % — SIGNIFICANT CHANGE UP (ref 13–44)
LYMPHOCYTES # BLD AUTO: 25.7 % — SIGNIFICANT CHANGE UP (ref 13–44)
MAGNESIUM SERPL-MCNC: 2.2 MG/DL — SIGNIFICANT CHANGE UP (ref 1.6–2.6)
MAGNESIUM SERPL-MCNC: 2.2 MG/DL — SIGNIFICANT CHANGE UP (ref 1.6–2.6)
MCHC RBC-ENTMCNC: 32.2 PG — SIGNIFICANT CHANGE UP (ref 27–34)
MCHC RBC-ENTMCNC: 32.2 PG — SIGNIFICANT CHANGE UP (ref 27–34)
MCHC RBC-ENTMCNC: 33.8 GM/DL — SIGNIFICANT CHANGE UP (ref 32–36)
MCHC RBC-ENTMCNC: 33.8 GM/DL — SIGNIFICANT CHANGE UP (ref 32–36)
MCV RBC AUTO: 95.4 FL — SIGNIFICANT CHANGE UP (ref 80–100)
MCV RBC AUTO: 95.4 FL — SIGNIFICANT CHANGE UP (ref 80–100)
MONOCYTES # BLD AUTO: 0.39 K/UL — SIGNIFICANT CHANGE UP (ref 0–0.9)
MONOCYTES # BLD AUTO: 0.39 K/UL — SIGNIFICANT CHANGE UP (ref 0–0.9)
MONOCYTES NFR BLD AUTO: 6.8 % — SIGNIFICANT CHANGE UP (ref 2–14)
MONOCYTES NFR BLD AUTO: 6.8 % — SIGNIFICANT CHANGE UP (ref 2–14)
NEUTROPHILS # BLD AUTO: 3.65 K/UL — SIGNIFICANT CHANGE UP (ref 1.8–7.4)
NEUTROPHILS # BLD AUTO: 3.65 K/UL — SIGNIFICANT CHANGE UP (ref 1.8–7.4)
NEUTROPHILS NFR BLD AUTO: 63.3 % — SIGNIFICANT CHANGE UP (ref 43–77)
NEUTROPHILS NFR BLD AUTO: 63.3 % — SIGNIFICANT CHANGE UP (ref 43–77)
NRBC # BLD: 0 /100 WBCS — SIGNIFICANT CHANGE UP (ref 0–0)
NRBC # BLD: 0 /100 WBCS — SIGNIFICANT CHANGE UP (ref 0–0)
PHOSPHATE SERPL-MCNC: 3.9 MG/DL — SIGNIFICANT CHANGE UP (ref 2.5–4.5)
PHOSPHATE SERPL-MCNC: 3.9 MG/DL — SIGNIFICANT CHANGE UP (ref 2.5–4.5)
PLATELET # BLD AUTO: 243 K/UL — SIGNIFICANT CHANGE UP (ref 150–400)
PLATELET # BLD AUTO: 243 K/UL — SIGNIFICANT CHANGE UP (ref 150–400)
POTASSIUM SERPL-MCNC: 4.4 MMOL/L — SIGNIFICANT CHANGE UP (ref 3.5–5.3)
POTASSIUM SERPL-MCNC: 4.4 MMOL/L — SIGNIFICANT CHANGE UP (ref 3.5–5.3)
POTASSIUM SERPL-SCNC: 4.4 MMOL/L — SIGNIFICANT CHANGE UP (ref 3.5–5.3)
POTASSIUM SERPL-SCNC: 4.4 MMOL/L — SIGNIFICANT CHANGE UP (ref 3.5–5.3)
PROT SERPL-MCNC: 6.2 G/DL — SIGNIFICANT CHANGE UP (ref 6–8.3)
PROT SERPL-MCNC: 6.2 G/DL — SIGNIFICANT CHANGE UP (ref 6–8.3)
RBC # BLD: 3.29 M/UL — LOW (ref 3.8–5.2)
RBC # BLD: 3.29 M/UL — LOW (ref 3.8–5.2)
RBC # FLD: 13.2 % — SIGNIFICANT CHANGE UP (ref 10.3–14.5)
RBC # FLD: 13.2 % — SIGNIFICANT CHANGE UP (ref 10.3–14.5)
SODIUM SERPL-SCNC: 139 MMOL/L — SIGNIFICANT CHANGE UP (ref 135–145)
SODIUM SERPL-SCNC: 139 MMOL/L — SIGNIFICANT CHANGE UP (ref 135–145)
WBC # BLD: 5.76 K/UL — SIGNIFICANT CHANGE UP (ref 3.8–10.5)
WBC # BLD: 5.76 K/UL — SIGNIFICANT CHANGE UP (ref 3.8–10.5)
WBC # FLD AUTO: 5.76 K/UL — SIGNIFICANT CHANGE UP (ref 3.8–10.5)
WBC # FLD AUTO: 5.76 K/UL — SIGNIFICANT CHANGE UP (ref 3.8–10.5)

## 2023-11-03 PROCEDURE — 74019 RADEX ABDOMEN 2 VIEWS: CPT | Mod: 26

## 2023-11-03 PROCEDURE — 99238 HOSP IP/OBS DSCHRG MGMT 30/<: CPT

## 2023-11-03 RX ORDER — OLMESARTAN MEDOXOMIL 5 MG/1
1 TABLET, FILM COATED ORAL
Qty: 30 | Refills: 0
Start: 2023-11-03

## 2023-11-03 RX ORDER — NALOXONE HYDROCHLORIDE 4 MG/.1ML
8 SPRAY NASAL
Qty: 1 | Refills: 0
Start: 2023-11-03

## 2023-11-03 RX ORDER — OXYCODONE HYDROCHLORIDE 5 MG/1
1 TABLET ORAL
Qty: 20 | Refills: 0
Start: 2023-11-03 | End: 2023-11-07

## 2023-11-03 RX ORDER — OLMESARTAN MEDOXOMIL-HYDROCHLOROTHIAZIDE 25; 40 MG/1; MG/1
1 TABLET, FILM COATED ORAL
Refills: 0 | DISCHARGE

## 2023-11-03 RX ADMIN — OXYCODONE HYDROCHLORIDE 5 MILLIGRAM(S): 5 TABLET ORAL at 10:02

## 2023-11-03 RX ADMIN — OXYCODONE HYDROCHLORIDE 5 MILLIGRAM(S): 5 TABLET ORAL at 12:46

## 2023-11-03 RX ADMIN — POLYETHYLENE GLYCOL 3350 17 GRAM(S): 17 POWDER, FOR SOLUTION ORAL at 05:56

## 2023-11-03 RX ADMIN — Medication 1 MILLIGRAM(S): at 05:56

## 2023-11-03 RX ADMIN — Medication 1 MILLIGRAM(S): at 14:43

## 2023-11-03 RX ADMIN — Medication 1 TABLET(S): at 12:46

## 2023-11-03 RX ADMIN — LOSARTAN POTASSIUM 50 MILLIGRAM(S): 100 TABLET, FILM COATED ORAL at 05:55

## 2023-11-03 RX ADMIN — MUPIROCIN 1 APPLICATION(S): 20 OINTMENT TOPICAL at 05:56

## 2023-11-03 RX ADMIN — PANTOPRAZOLE SODIUM 40 MILLIGRAM(S): 20 TABLET, DELAYED RELEASE ORAL at 05:55

## 2023-11-03 RX ADMIN — GABAPENTIN 300 MILLIGRAM(S): 400 CAPSULE ORAL at 10:02

## 2023-11-03 RX ADMIN — SENNA PLUS 1 TABLET(S): 8.6 TABLET ORAL at 12:46

## 2023-11-03 RX ADMIN — OXYCODONE HYDROCHLORIDE 5 MILLIGRAM(S): 5 TABLET ORAL at 13:46

## 2023-11-03 RX ADMIN — OXYCODONE HYDROCHLORIDE 10 MILLIGRAM(S): 5 TABLET ORAL at 16:09

## 2023-11-03 RX ADMIN — OXYCODONE HYDROCHLORIDE 5 MILLIGRAM(S): 5 TABLET ORAL at 11:00

## 2023-11-03 RX ADMIN — Medication 81 MILLIGRAM(S): at 12:46

## 2023-11-03 RX ADMIN — CHLORHEXIDINE GLUCONATE 1 APPLICATION(S): 213 SOLUTION TOPICAL at 12:46

## 2023-11-03 RX ADMIN — Medication 100 MICROGRAM(S): at 05:55

## 2023-11-03 NOTE — PROGRESS NOTE ADULT - ATTENDING COMMENTS
patient seen and examined   Labs and vitals are reviewed   s/p right olecranon fx surgery. progressing well  no events   Pain is much improved   had BM   BP acceptable   trach site ok  RUE in sling good capillary refill on right fingers  Post op stable       73 Y/O/F with pmhx GERD, CVA, Laryngeal Ca s/p trach, hypothyroidism, breast CA presents. fx  right olecranon s/p ORIF  OT consulted - out patient PT/OT  pain is under control   NWB on RUE  bowel regimen.    dc planning.  out patient PT  out patient follow up with Ortho  d/w patient agree with plan
patient seen and examined   Labs and vitals are reviewed   s/p right olecranon fx surgery   post op stable   Serum Na is okay   OT consult  post op care as per ortho  d/w patient and her 
patient seen and examined   Labs and vitals are reviewed   s/p right olecranon fx surgery.  c/o constipation   Post op stable     71 Y/O/F with pmhx GERD, CVA, Laryngeal Ca s/p trach, hypothyroidism, breast CA presents. fx  right olecranon s/p ORIF  OT consulted - out patient   Post op care   pain med   bowel regimen   dc planning  d/w patient and her .

## 2023-11-03 NOTE — PROGRESS NOTE ADULT - PROBLEM SELECTOR PLAN 2
Pt originally admitted after hypona found on outpt labs to 125. Was 135 day after admission. Now decreased to 132.  - Fluid restrict to 1500ml Pt originally admitted after hypona found on outpt labs to 125. Was 135 day after admission  - Na 139 today 11/3  - D/c fluid restiction.

## 2023-11-03 NOTE — DISCHARGE NOTE NURSING/CASE MANAGEMENT/SOCIAL WORK - NSDCFUADDAPPT_GEN_ALL_CORE_FT
APPTS ARE READY TO BE MADE: [x] YES    Best Family or Patient Contact (if needed):    Additional Information about above appointments (if needed):    1: F/u w/ pcp in 5-7 days  2: F/u w/ orho in 5-7 days   3:     Other comments or requests:

## 2023-11-03 NOTE — PROGRESS NOTE ADULT - SUBJECTIVE AND OBJECTIVE BOX
ORTHOPEDIC PROGRESS NOTE    Overnight events: None    SUBJECTIVE: Pt seen and examined at bedside. Patient is doing well, no acute complaints this AM. Pain is controlled with medication      OBJECTIVE:  Vital Signs Last 24 Hrs  T(C): 36.2 (03 Nov 2023 05:46), Max: 37.7 (02 Nov 2023 20:18)  T(F): 97.2 (03 Nov 2023 05:46), Max: 99.8 (02 Nov 2023 20:18)  HR: 76 (03 Nov 2023 05:46) (75 - 91)  BP: 111/72 (03 Nov 2023 05:46) (111/72 - 174/96)  BP(mean): --  RR: 18 (03 Nov 2023 05:46) (18 - 18)  SpO2: 95% (03 Nov 2023 05:46) (95% - 98%)    Parameters below as of 03 Nov 2023 05:46  Patient On (Oxygen Delivery Method): room air          11-01-23 @ 07:01  -  11-02-23 @ 07:00  --------------------------------------------------------  IN: 340 mL / OUT: 0 mL / NET: 340 mL        Physical Examination:  GEN: NAD, resting quietly  PULM: symmetric chest rise bilaterally, no increased WOB  ABD: nondistended  EXTR:   RUE:  NWB in Sling  Limited elbow ROM 2/2 pain  Motor: Musc/Med/Rad/AIN/PIN/U intact  Sensory: Musc/Med/Rad/U SILT  +Rad pulse, WWP        LABS:                        11.1   9.59  )-----------( 242      ( 02 Nov 2023 11:27 )             32.6       11-02    132<L>  |  98  |  11  ----------------------------<  185<H>  4.6   |  21<L>  |  0.66    Ca    8.9      02 Nov 2023 11:27  Phos  2.8     11-02  Mg     2.1     11-02    TPro  6.5  /  Alb  4.0  /  TBili  0.4  /  DBili  x   /  AST  31  /  ALT  34  /  AlkPhos  90  11-02

## 2023-11-03 NOTE — PROGRESS NOTE ADULT - PROVIDER SPECIALTY LIST ADULT
Orthopedics
Cardiology
Orthopedics
Cardiology
Orthopedics
Internal Medicine

## 2023-11-03 NOTE — PROGRESS NOTE ADULT - PROBLEM SELECTOR PLAN 7
- DVT ppx: Heparin SQ   - GI ppx: PPI   - Diet: DASH
- DVT ppx: Heparin SQ   - GI ppx: PPI   - Diet: DASH

## 2023-11-03 NOTE — DISCHARGE NOTE NURSING/CASE MANAGEMENT/SOCIAL WORK - PATIENT PORTAL LINK FT
You can access the FollowMyHealth Patient Portal offered by [facility] by registering at the following website: [email or web address]. By joining "[employer]’s FollowMyHealth portal, you will also be able to view your health information using other applications (apps) compatible with our system.

## 2023-11-03 NOTE — PROGRESS NOTE ADULT - SUBJECTIVE AND OBJECTIVE BOX
*******************************  Clifton Urban MD (PGY-1)  Internal Medicine  Contact via [employer] TEAMS  *******************************    PROGRESS NOTE:     Patient is a 73y old  Female who presents with a chief complaint of Abnormal labs, elbow fx (03 Nov 2023 06:15)      INTERVAL EVENTS: No acute overnight events.     SUBJECTIVE: Patient seen and examined at bedside. This morning, the patient is comfortable and doing well. No acute complaints. Denies fevers, chills, N/V/D, chest pain, SOB, abdominal pain.    MEDICATIONS  (STANDING):  ARIPiprazole 5 milliGRAM(s) Oral at bedtime  aspirin  chewable 81 milliGRAM(s) Oral daily  atorvastatin 80 milliGRAM(s) Oral at bedtime  chlorhexidine 4% Liquid 1 Application(s) Topical daily  clonazePAM  Tablet 1 milliGRAM(s) Oral three times a day  gabapentin 300 milliGRAM(s) Oral two times a day  lactobacillus acidophilus 1 Tablet(s) Oral daily  levothyroxine 100 MICROGram(s) Oral daily  losartan 50 milliGRAM(s) Oral daily  montelukast 10 milliGRAM(s) Oral daily  mupirocin 2% Nasal 1 Application(s) Both Nostrils two times a day  pantoprazole    Tablet 40 milliGRAM(s) Oral before breakfast  polyethylene glycol 3350 17 Gram(s) Oral two times a day  senna 1 Tablet(s) Oral daily    MEDICATIONS  (PRN):  acetaminophen     Tablet .. 650 milliGRAM(s) Oral every 6 hours PRN Temp greater or equal to 38C (100.4F), Mild Pain (1 - 3)  albuterol    90 MICROgram(s) HFA Inhaler 2 Puff(s) Inhalation every 6 hours PRN Shortness of Breath and/or Wheezing  aluminum hydroxide/magnesium hydroxide/simethicone Suspension 30 milliLiter(s) Oral every 4 hours PRN Dyspepsia  melatonin 3 milliGRAM(s) Oral at bedtime PRN Insomnia  ondansetron Injectable 4 milliGRAM(s) IV Push every 8 hours PRN Nausea and/or Vomiting  oxyCODONE    IR 10 milliGRAM(s) Oral every 4 hours PRN Severe Pain (7 - 10)  oxyCODONE    IR 5 milliGRAM(s) Oral every 4 hours PRN Moderate Pain (4 - 6)      CAPILLARY BLOOD GLUCOSE        I&O's Summary    01 Nov 2023 07:01  -  02 Nov 2023 07:00  --------------------------------------------------------  IN: 340 mL / OUT: 0 mL / NET: 340 mL        PHYSICAL EXAM:  Vital Signs Last 24 Hrs  T(C): 36.2 (03 Nov 2023 05:46), Max: 37.7 (02 Nov 2023 20:18)  T(F): 97.2 (03 Nov 2023 05:46), Max: 99.8 (02 Nov 2023 20:18)  HR: 76 (03 Nov 2023 05:46) (75 - 91)  BP: 111/72 (03 Nov 2023 05:46) (111/72 - 174/96)  BP(mean): --  RR: 18 (03 Nov 2023 05:46) (18 - 18)  SpO2: 95% (03 Nov 2023 05:46) (95% - 98%)    Parameters below as of 03 Nov 2023 05:46  Patient On (Oxygen Delivery Method): room air        GENERAL: NAD, lying in bed comfortably  HEAD: Atraumatic, normocephalic  EYES: EOMI, PERRLA  ENT: Moist mucous membranes  NECK: Supple, no JVD  HEART: S1, S2, Regular rate and rhythm, no murmurs, rubs, or gallops  LUNGS: Unlabored respirations, clear to auscultation bilaterally, no crackles, wheezing, or rhonchi  ABDOMEN: Soft, nontender, nondistended, +BS  EXTREMITIES: No clubbing, cyanosis, or edema  NERVOUS SYSTEM:  A&Ox3, no focal deficits   SKIN: No rashes or lesions    LABS:                        10.6   5.76  )-----------( 243      ( 03 Nov 2023 06:27 )             31.4     11-02    132<L>  |  98  |  11  ----------------------------<  185<H>  4.6   |  21<L>  |  0.66    Ca    8.9      02 Nov 2023 11:27  Phos  2.8     11-02  Mg     2.1     11-02    TPro  6.5  /  Alb  4.0  /  TBili  0.4  /  DBili  x   /  AST  31  /  ALT  34  /  AlkPhos  90  11-02          Urinalysis Basic - ( 02 Nov 2023 11:27 )    Color: x / Appearance: x / SG: x / pH: x  Gluc: 185 mg/dL / Ketone: x  / Bili: x / Urobili: x   Blood: x / Protein: x / Nitrite: x   Leuk Esterase: x / RBC: x / WBC x   Sq Epi: x / Non Sq Epi: x / Bacteria: x          RADIOLOGY & ADDITIONAL TESTS:  Results Reviewed:   Imaging Personally Reviewed:  Electrocardiogram Personally Reviewed:  Tele: *******************************  Clifton Urban MD (PGY-1)  Internal Medicine  Contact via [employer] TEAMS  *******************************    PROGRESS NOTE:     Patient is a 73y old  Female who presents with a chief complaint of Abnormal labs, elbow fx (03 Nov 2023 06:15)      INTERVAL EVENTS: No acute overnight events.     SUBJECTIVE: Patient seen and examined at bedside. &/10 R elbow pain. Reports BM overnight. Denies fevers, chills, N/V/D, chest pain, SOB, abdominal pain.    MEDICATIONS  (STANDING):  ARIPiprazole 5 milliGRAM(s) Oral at bedtime  aspirin  chewable 81 milliGRAM(s) Oral daily  atorvastatin 80 milliGRAM(s) Oral at bedtime  chlorhexidine 4% Liquid 1 Application(s) Topical daily  clonazePAM  Tablet 1 milliGRAM(s) Oral three times a day  gabapentin 300 milliGRAM(s) Oral two times a day  lactobacillus acidophilus 1 Tablet(s) Oral daily  levothyroxine 100 MICROGram(s) Oral daily  losartan 50 milliGRAM(s) Oral daily  montelukast 10 milliGRAM(s) Oral daily  mupirocin 2% Nasal 1 Application(s) Both Nostrils two times a day  pantoprazole    Tablet 40 milliGRAM(s) Oral before breakfast  polyethylene glycol 3350 17 Gram(s) Oral two times a day  senna 1 Tablet(s) Oral daily    MEDICATIONS  (PRN):  acetaminophen     Tablet .. 650 milliGRAM(s) Oral every 6 hours PRN Temp greater or equal to 38C (100.4F), Mild Pain (1 - 3)  albuterol    90 MICROgram(s) HFA Inhaler 2 Puff(s) Inhalation every 6 hours PRN Shortness of Breath and/or Wheezing  aluminum hydroxide/magnesium hydroxide/simethicone Suspension 30 milliLiter(s) Oral every 4 hours PRN Dyspepsia  melatonin 3 milliGRAM(s) Oral at bedtime PRN Insomnia  ondansetron Injectable 4 milliGRAM(s) IV Push every 8 hours PRN Nausea and/or Vomiting  oxyCODONE    IR 10 milliGRAM(s) Oral every 4 hours PRN Severe Pain (7 - 10)  oxyCODONE    IR 5 milliGRAM(s) Oral every 4 hours PRN Moderate Pain (4 - 6)      CAPILLARY BLOOD GLUCOSE        I&O's Summary    01 Nov 2023 07:01  -  02 Nov 2023 07:00  --------------------------------------------------------  IN: 340 mL / OUT: 0 mL / NET: 340 mL        PHYSICAL EXAM:  Vital Signs Last 24 Hrs  T(C): 36.2 (03 Nov 2023 05:46), Max: 37.7 (02 Nov 2023 20:18)  T(F): 97.2 (03 Nov 2023 05:46), Max: 99.8 (02 Nov 2023 20:18)  HR: 76 (03 Nov 2023 05:46) (75 - 91)  BP: 111/72 (03 Nov 2023 05:46) (111/72 - 174/96)  BP(mean): --  RR: 18 (03 Nov 2023 05:46) (18 - 18)  SpO2: 95% (03 Nov 2023 05:46) (95% - 98%)    Parameters below as of 03 Nov 2023 05:46  Patient On (Oxygen Delivery Method): room air        GENERAL: NAD, lying in bed comfortably  HEAD: Atraumatic, normocephalic  HEART: S1, S2, Regular rate and rhythm, no murmurs, rubs, or gallops  LUNGS: Unlabored respirations, clear to auscultation bilaterally, no crackles, wheezing, or rhonchi  ABDOMEN: Soft, nontender, nondistended  EXTREMITIES: R hand swelling.  strength decreased in R hand compared to left. Mild decrease in sensation R hand 2/2 swelling. 2+ Pulses R hand.   NERVOUS SYSTEM:  A&Ox3      LABS:                        10.6   5.76  )-----------( 243      ( 03 Nov 2023 06:27 )             31.4     11-02    132<L>  |  98  |  11  ----------------------------<  185<H>  4.6   |  21<L>  |  0.66    Ca    8.9      02 Nov 2023 11:27  Phos  2.8     11-02  Mg     2.1     11-02    TPro  6.5  /  Alb  4.0  /  TBili  0.4  /  DBili  x   /  AST  31  /  ALT  34  /  AlkPhos  90  11-02          Urinalysis Basic - ( 02 Nov 2023 11:27 )    Color: x / Appearance: x / SG: x / pH: x  Gluc: 185 mg/dL / Ketone: x  / Bili: x / Urobili: x   Blood: x / Protein: x / Nitrite: x   Leuk Esterase: x / RBC: x / WBC x   Sq Epi: x / Non Sq Epi: x / Bacteria: x          RADIOLOGY & ADDITIONAL TESTS:  Results Reviewed:   Imaging Personally Reviewed:  Electrocardiogram Personally Reviewed:  Tele: *******************************  Clifton Urban MD (PGY-1)  Internal Medicine  Contact via [employer] TEAMS  *******************************    PROGRESS NOTE:     Patient is a 73y old  Female who presents with a chief complaint of Abnormal labs, elbow fx (03 Nov 2023 06:15)      INTERVAL EVENTS: No acute overnight events.     SUBJECTIVE: Patient seen and examined at bedside. &/10 R elbow pain. Reports BM overnight. Denies fevers, chills, N/V/D, chest pain, SOB, abdominal pain.    MEDICATIONS  (STANDING):  ARIPiprazole 5 milliGRAM(s) Oral at bedtime  aspirin  chewable 81 milliGRAM(s) Oral daily  atorvastatin 80 milliGRAM(s) Oral at bedtime  chlorhexidine 4% Liquid 1 Application(s) Topical daily  clonazePAM  Tablet 1 milliGRAM(s) Oral three times a day  gabapentin 300 milliGRAM(s) Oral two times a day  lactobacillus acidophilus 1 Tablet(s) Oral daily  levothyroxine 100 MICROGram(s) Oral daily  losartan 50 milliGRAM(s) Oral daily  montelukast 10 milliGRAM(s) Oral daily  mupirocin 2% Nasal 1 Application(s) Both Nostrils two times a day  pantoprazole    Tablet 40 milliGRAM(s) Oral before breakfast  polyethylene glycol 3350 17 Gram(s) Oral two times a day  senna 1 Tablet(s) Oral daily    MEDICATIONS  (PRN):  acetaminophen     Tablet .. 650 milliGRAM(s) Oral every 6 hours PRN Temp greater or equal to 38C (100.4F), Mild Pain (1 - 3)  albuterol    90 MICROgram(s) HFA Inhaler 2 Puff(s) Inhalation every 6 hours PRN Shortness of Breath and/or Wheezing  aluminum hydroxide/magnesium hydroxide/simethicone Suspension 30 milliLiter(s) Oral every 4 hours PRN Dyspepsia  melatonin 3 milliGRAM(s) Oral at bedtime PRN Insomnia  ondansetron Injectable 4 milliGRAM(s) IV Push every 8 hours PRN Nausea and/or Vomiting  oxyCODONE    IR 10 milliGRAM(s) Oral every 4 hours PRN Severe Pain (7 - 10)  oxyCODONE    IR 5 milliGRAM(s) Oral every 4 hours PRN Moderate Pain (4 - 6)      CAPILLARY BLOOD GLUCOSE        I&O's Summary    01 Nov 2023 07:01  -  02 Nov 2023 07:00  --------------------------------------------------------  IN: 340 mL / OUT: 0 mL / NET: 340 mL        PHYSICAL EXAM:  Vital Signs Last 24 Hrs  T(C): 36.2 (03 Nov 2023 05:46), Max: 37.7 (02 Nov 2023 20:18)  T(F): 97.2 (03 Nov 2023 05:46), Max: 99.8 (02 Nov 2023 20:18)  HR: 76 (03 Nov 2023 05:46) (75 - 91)  BP: 111/72 (03 Nov 2023 05:46) (111/72 - 174/96)  BP(mean): --  RR: 18 (03 Nov 2023 05:46) (18 - 18)  SpO2: 95% (03 Nov 2023 05:46) (95% - 98%)    Parameters below as of 03 Nov 2023 05:46  Patient On (Oxygen Delivery Method): room air        GENERAL: NAD, lying in bed comfortably  HEAD: Atraumatic, normocephalic  HEART: S1, S2, Regular rate and rhythm, no murmurs, rubs, or gallops.  LUNGS: Unlabored respirations, clear to auscultation bilaterally, no crackles, wheezing, or rhonchi  ABDOMEN: Soft, nontender, nondistended  EXTREMITIES: R hand swelling.  strength decreased in R hand compared to left. Mild decrease in sensation R hand 2/2 swelling. 2+ Pulses R hand.   NERVOUS SYSTEM:  A&Ox3      LABS:                        10.6   5.76  )-----------( 243      ( 03 Nov 2023 06:27 )             31.4     11-02    132<L>  |  98  |  11  ----------------------------<  185<H>  4.6   |  21<L>  |  0.66    Ca    8.9      02 Nov 2023 11:27  Phos  2.8     11-02  Mg     2.1     11-02    TPro  6.5  /  Alb  4.0  /  TBili  0.4  /  DBili  x   /  AST  31  /  ALT  34  /  AlkPhos  90  11-02          Urinalysis Basic - ( 02 Nov 2023 11:27 )    Color: x / Appearance: x / SG: x / pH: x  Gluc: 185 mg/dL / Ketone: x  / Bili: x / Urobili: x   Blood: x / Protein: x / Nitrite: x   Leuk Esterase: x / RBC: x / WBC x   Sq Epi: x / Non Sq Epi: x / Bacteria: x          RADIOLOGY & ADDITIONAL TESTS:  Results Reviewed:   Imaging Personally Reviewed:  Electrocardiogram Personally Reviewed:  Tele:

## 2023-11-03 NOTE — PROGRESS NOTE ADULT - ASSESSMENT
ASSESSMENT & PLAN  72yFemale w/ R olecranon fx s/p ORIF on 11/1  -NWB RUE in sling  -splint precautions  -pain control  -ice/cold compress, elevation  -Ambulate with PT/OT then stable for dispo for home    For all questions related to patient care, please reach out to the on-call team via the pager.     Janki Rivera, PGY 2  Orthopaedic Surgery  [facility] [phone]  [facility] [phone]  [facility] [phone]

## 2023-11-03 NOTE — PROGRESS NOTE ADULT - ASSESSMENT
72y F pmh GERD, CVA, laryngeal CA s/p trach, hypothyroidism, breast CA presents to ED c/o fatigue iso low sodium c/b mechanical fall pending surgical intervention being admitted for further management; s/p R olecranon ORIF 11/1

## 2023-11-03 NOTE — PROGRESS NOTE ADULT - REASON FOR ADMISSION
Abnormal labs, elbow fx

## 2023-11-03 NOTE — MEDICAL STUDENT PROGRESS NOTE(EDUCATION) - ASSESSMENT
· Assessment	  72y F pmh GERD, CVA, laryngeal CA s/p trach, hypothyroidism, breast CA presents to ED c/o mechanical fall  w/ R elbow fracture pending surgical intervention being admitted for further management of hyponatremia; s/p R olecranon ORIF 11/1     Problem/Plan - 1:  ·  Problem: Fracture of right elbow.   ·  Plan: S/p mechanical fall 2days ago  - XR Rt arm & elbow: Acute intra-articular fracture through the olecranon process of the ulna  - S/p ORIF R olecranon 11/1  - Ortho consulted; f/u recs  - F/u PT/OT  - Pain regimen: moderate Tylenol q6 moderate, oxy 5 q4 moderate, oxy 10 q4 severe    Problem/Plan - 2:  ·  Problem: Hyponatremia.  ·  Plan: Pt originally admitted after hypona found on outpt labs to 125. Was 135 day after admission. Now decreased to 132.  - Fluid restrict to 1500ml.    Problem/Plan - 3:  ·  Problem: Constipation.  ·  Plan: Pt w/ no BM x3 days.  - Start miralax BID  - Start senna qd  - Dulcolax suppository.    Problem/Plan - 4:  ·  Problem: HTN (hypertension).  - start home Olmesartan 20mg.    Problem/Plan - 5:  ·  Problem: Hypothyroid.  ·  Plan: c/w Levothyroxine   - F/u TSH.    Problem/Plan - 6:  ·  Problem: CVA (cerebral vascular accident).  ·  Plan: - c/w statin, ASA.    Problem/Plan - 7:  ·  Problem: Prophylactic measure.   ·  Plan: - DVT ppx: Heparin SQ   - GI ppx: PPI   - Diet: DASH.
Assessment and Plan:   · Assessment	  72y F pmh GERD, CVA, laryngeal CA s/p trach, hypothyroidism, breast CA presents to ED c/o fatigue iso low sodium c/b mechanical fall pending surgical intervention being admitted for further management of hyponatremia. Since admission, sodium levels have normalized.           Problem/Plan - 1:  ·  Problem: Fracture of right elbow.  ·  Plan: S/p mechanical fall 2days ago  - XR Rt arm & elbow: Acute intra-articular fracture through the olecranon process of the ulna  - Prn pain control w/ tylenol and oxycodone  - S/p ORIF R olecronon 11/1  - Ortho consulted; f/u recs.    Problem/Plan - 2:  ·  Problem: Acute hyponatremia.  ·  Plan: HypoNa on O/P labs-- Na 125  - Endorses new BP med (HTZ)  was added to her regimen apx 2wk ago.  - Was previously only taking Olmesartan.   - Suspect addition of HTZ likely contributed to electrolyte disturbance   - EKG NSR    - F/u Urine studies  - Fluid restriction    - Trend labs  - Na now 135 11/1.    Problem/Plan - 3:  ·  Problem: HTN (hypertension).   ·  Plan: BP stable off meds, will hold for now  - If BP becomes uncontrolled can start Olmesartan 20mg.    Problem/Plan - 4:  ·  Problem: Hypothyroid.   ·  Plan: c/w Levothyroxine   - F/u TSH.    Problem/Plan - 5:  ·  Problem: CVA (cerebral vascular accident).   ·  Plan: - c/w statin, ASA.    Problem/Plan - 6:  ·  Problem: Prophylactic measure.   ·  Plan: - DVT ppx: Heparin SQ   - GI ppx: PPI   - Diet: DASH >> NPO MN for possible procedure   - PT consult.
· Assessment    72y F pmh GERD, CVA, laryngeal CA s/p trach, hypothyroidism, breast CA presents to ED c/o fatigue iso low sodium c/b mechanical fall pending surgical intervention being admitted for further management; s/p R olecranon ORIF 11/1     Problem/Plan - 1:  ·  Problem: Fracture of right elbow.   ·  Plan: S/p mechanical fall 2days ago  - XR Rt arm & elbow: Acute intra-articular fracture through the olecranon process of the ulna  - Prn pain control   - S/p ORIF R olecranon 11/1  - Ortho consulted; f/u recs  - PT/OT cleared for discharge, f/u OP  - Pain regimen: moderate Tylenol q6 moderate, oxy 5 q4 moderate, oxy 10 q4 severe, discharge with oxy 5mg q6 5 days  - Bowel regimen: Senna + Miralax.    Problem/Plan - 2:  ·  Problem: Hyponatremia.   ·  Plan: Pt originally admitted after hypona found on outpt labs to 125. Was 135 day after admission  - Na 139 today 11/3  - D/c fluid restiction.    Problem/Plan - 3:  ·  Problem: Constipation.   ·  Plan: Pt with BM yesterday and this morning   - c/w miralax BID  - c/w senna qd    Problem/Plan - 4:  ·  Problem: HTN (hypertension).   ·  c/w losartan while inpatient  - d/c with home Olmesartan 20mg.    Problem/Plan - 5:  ·  Problem: Hypothyroid.   ·  Plan: c/w Levothyroxine   - F/u TSH.    Problem/Plan - 6:  ·  Problem: CVA (cerebral vascular accident).   ·  Plan: - c/w statin, ASA.    Problem/Plan - 7:  ·  Problem: Prophylactic measure.   ·  Plan: - DVT ppx: Heparin SQ   - GI ppx: PPI   - Diet: DASH

## 2023-11-03 NOTE — MEDICAL STUDENT PROGRESS NOTE(EDUCATION) - SUBJECTIVE AND OBJECTIVE BOX
Medical Student Note    Patient Information:  DEEPTHI RIBEIRO / 72y / Female / MRN#:[id number]    Hospital Day: 1d    Interval History:  Patient seen and examined at bedside. No acute events overnight. Patient continues to endorse R arm pain, but denies any fatigue, weakness, chills, HA, SOB, or chest pain.     HPI:  72y F pmh GERD, CVA, laryngeal CA s/p laryngectomy and trach, hypothyroidism, breast CA presents to ED c/o fatigue iso low sodium on outpatient preop labs.  Patient had mechanical fall on , was eval at outside hospital, found to have right elbow fracture, and is scheduled to have surgery with outpatient orthopedics from [facility]. Pre-op labs obtained by PMD (Dr Mora) but pt noted to be hyponatremic  so was sent to ED by PMD,  Pt reports hx of  hypoNa 1x in the past and endorses that she drinks ~3L water per day.  Says she started a new BP med 2 wk ago.       ROS: Denies CP, SOB, palpitation, N/V/D, fever, cough, chills, dizziness, abm pain, recent travel, sick contact, change in bowel and urinary habits     A 10-system ROS was performed and is negative except as noted above and/or in the HPI.    ED: Labs and imaging obtained. Ortho also was consulted by ED       Past Medical History:  Depression    Fibromyalgia    Asthma    Herniated Disc    Hypothyroid    Breast Cancer    Anxiety    Osteoporosis    Osteoarthritis    Patellar Fracture    Hypoglycemia    Obstructive Sleep Apnea    Environmental Allergies    Vocal cord nodule    Laryngeal cancer    Arthritis    Neuropathy    Diverticulitis    GERD (gastroesophageal reflux disease)    CVA (cerebrovascular accident)    Age-related nuclear cataract, right eye      Past Surgical History:   Section    S/P Cholecystectomy    S/P Lumpectomy of Breast    Anal Abscess    Cosmetic Surgery    S/P Gastric Bypass    S/P carpal tunnel release    S/P mastectomy, bilateral    H/O breast surgery    S/P excision of vocal cord nodule    History of laryngoscopy    S/P laryngectomy    History of loop recorder      Allergies:  sulfa drugs (Other)    Medications:  PRN:  acetaminophen     Tablet .. 650 milliGRAM(s) Oral every 6 hours PRN Temp greater or equal to 38C (100.4F), Mild Pain (1 - 3)  albuterol    90 MICROgram(s) HFA Inhaler 2 Puff(s) Inhalation every 6 hours PRN Shortness of Breath and/or Wheezing  aluminum hydroxide/magnesium hydroxide/simethicone Suspension 30 milliLiter(s) Oral every 4 hours PRN Dyspepsia  melatonin 3 milliGRAM(s) Oral at bedtime PRN Insomnia  ondansetron Injectable 4 milliGRAM(s) IV Push every 8 hours PRN Nausea and/or Vomiting  oxyCODONE    IR 10 milliGRAM(s) Oral every 4 hours PRN Severe Pain (7 - 10)  oxyCODONE    IR 5 milliGRAM(s) Oral every 4 hours PRN Moderate Pain (4 - 6)    Standing:  ARIPiprazole 5 milliGRAM(s) Oral at bedtime  aspirin  chewable 81 milliGRAM(s) Oral daily  atorvastatin 80 milliGRAM(s) Oral at bedtime  chlorhexidine 4% Liquid 1 Application(s) Topical daily  clonazePAM  Tablet 1 milliGRAM(s) Oral three times a day  gabapentin 300 milliGRAM(s) Oral two times a day  lactobacillus acidophilus 1 Tablet(s) Oral daily  levothyroxine 100 MICROGram(s) Oral daily  montelukast 10 milliGRAM(s) Oral daily  pantoprazole    Tablet 40 milliGRAM(s) Oral before breakfast    Home:  Acidophilus oral tablet: 1 tab(s) orally once a day  ARIPiprazole 5 mg oral tablet: 1 orally once a day (at bedtime)  aspirin 81 mg oral tablet, chewable: 1 tab(s) orally once a day  atorvastatin 80 mg oral tablet: 1 tab(s) orally once a day  biotin: 63446 mcg orally once a day  buPROPion 75 mg oral tablet: 1 orally once a day  clonazePAM 1 mg oral tablet: 1 tab(s) orally 3 times a day  doxepin: 50 milligram(s) orally once a day (at bedtime)  gabapentin 300 mg oral capsule: 1 cap(s) orally 2 times a day  levothyroxine 100 mcg (0.1 mg) oral tablet: 1 tab(s) orally once a day  methylphenidate 27 mg/24 hr oral tablet, extended release: 1 orally once a day  montelukast 10 mg oral tablet: 1 tab(s) orally once a day  olmesartan-hydrochlorothiazide 20 mg-12.5 mg oral tablet: 1 tab(s) orally once a day  ProAir HFA 90 mcg/inh inhalation aerosol: 2 puff(s) inhaled every 6 hours as needed  Viactive dietary supplement: 1 chewable tab orally once a day LD 10/22/2023    Vitals:  T(C): 36.3, Max: 36.6 (10-31-23 @ 23:40)  T(F): 97.4, Max: 97.8 (10-31-23 @ 23:40)  HR: 70 (64 - 103)  BP: 135/65 (118/52 - 163/76)  RR: 20 (14 - 20)  SpO2: 93% (93% - 100%)    Physical Exam:  General: Awake, Alert. Not in acute distress.  HEENT: Normocephalic atraumatic, PERRLA, EOMI, Trach tube  Heart: RRR; S1, S2; No murmurs.  Lungs: Clear to auscultation bilaterally, no increased work of breathing or accessory muscle use  Abdomen: Soft, nontender, nondistended.  Extremities: Sling on R arm, no sensory or motor deficits in R hand, palpable pulses. B/L edema in lower extremities.  Neuro: AAOx3, CN 2-12 intact. No focal deficits.    Labs:  CBC ( @ 02:59)                        Hgb: 11.0   WBC: 5.26  )-----------------( Plts: 176                              Hct: 32.0     Chem ( @ 02:59)  Na: 135  |     Cl: 99     |  BUN: 9   -----------------------------------------< Gluc: 100    K: 4.6   |    HCO3: 25  |  Cr: 0.60    Ca 9.6 ( @ 02:59)    LFTs (10-31 @ 20:27)  TPro 6.8  /  Alb 3.9  TBili 0.3  /  DBili     AST 48  /  ALT 45  /  AlkPhos 91        PT/INR ( @ 02:59)  PT: 10.6 ; INR: 0.96   PTT: 32.1           Urinalysis Basic ( @ 04:39)  Color: Yellow  Appearance: Clear  S.005  pH:   Gluc:   Ketone: Negative  Bili: Negative  Urobili: 1.0   Blood:   Protein: Negative  Nitrite: Negative   Leuk Esterase: Trace  RBC: 2  WBC: 1   Sq Epi:   Non Sq Epi: 1  Bacteria: Negative    Microbiology:    Radiology:  < from: Xray Elbow AP + Lateral + Oblique, Right (1031.23 @ 20:22) >  IMPRESSION:  Acute, displaced intra-articular fracture through the olecranon process   of the ulna. 11mm posterior offset of the olecranon relative to the ulnar   shaft. Associated elbow joint effusion.  There is splint material spanning from the mid humerus to the hand.  Mild arthrosis of the basilar joint, STT joint, radiocarpal joint, and of   the acromioclavicular joint.  Surgicalclips noted in the right axilla.    < end of copied text >  
Medical Student Note    Patient Information:  DEEPTHI RIBEIRO / 73y / Female / MRN#:[id number]    Hospital Day: 3d    Interval History:  Patient seen and examined at bedside. No acute events overnight. Patient states her R arm pain is significantly improved. Patient states she had a BM yesterday and last night and has been passing flatus. Patient had some nausea w/o vomiting and hypertension last night whenever she was worried about an incident involving her family member, but has since resolved. Patient to be discharged     HPI:  72y F pmh GERD, CVA, laryngeal CA s/p trach, hypothyroidism, breast CA presents to ED c/o fatigue iso low sodium on outpatient preop labs.  Patient had mechanical fall on , was eval at outside hospital, found to have right elbow fracture, and is scheduled to have surgery with outpatient orthopedics from [facility]. Pre-op labs obtained by PMD (Dr Mora) but pt noted to be hyponatremic  so was sent to ED by PMD,  Pt reports hx of  hypoNa 1x in the past and endorses that she drinks ~3L water per day.  Says she started a new BP med 2 wk ago.       ROS: Denies CP, SOB, palpitation, N/V/D, fever, cough, chills, dizziness, abm pain, recent travel, sick contact, change in bowel and urinary habits     A 10-system ROS was performed and is negative except as noted above and/or in the HPI.    ED: Labs and imaging obtained. Ortho also was consulted by ED       Past Medical History:  Depression    Fibromyalgia    Asthma    Herniated Disc    Hypothyroid    Breast Cancer    Anxiety    Osteoporosis    Osteoarthritis    Patellar Fracture    Hypoglycemia    Obstructive Sleep Apnea    Environmental Allergies    Vocal cord nodule    Laryngeal cancer    Arthritis    Neuropathy    Diverticulitis    GERD (gastroesophageal reflux disease)    CVA (cerebrovascular accident)    Age-related nuclear cataract, right eye      Past Surgical History:   Section    S/P Cholecystectomy    S/P Lumpectomy of Breast    Anal Abscess    Cosmetic Surgery    S/P Gastric Bypass    S/P carpal tunnel release    S/P mastectomy, bilateral    H/O breast surgery    S/P excision of vocal cord nodule    History of laryngoscopy    S/P laryngectomy    History of loop recorder      Allergies:  sulfa drugs (Other)    Medications:  PRN:  acetaminophen     Tablet .. 650 milliGRAM(s) Oral every 6 hours PRN Temp greater or equal to 38C (100.4F), Mild Pain (1 - 3)  albuterol    90 MICROgram(s) HFA Inhaler 2 Puff(s) Inhalation every 6 hours PRN Shortness of Breath and/or Wheezing  aluminum hydroxide/magnesium hydroxide/simethicone Suspension 30 milliLiter(s) Oral every 4 hours PRN Dyspepsia  melatonin 3 milliGRAM(s) Oral at bedtime PRN Insomnia  ondansetron Injectable 4 milliGRAM(s) IV Push every 8 hours PRN Nausea and/or Vomiting  oxyCODONE    IR 10 milliGRAM(s) Oral every 4 hours PRN Severe Pain (7 - 10)  oxyCODONE    IR 5 milliGRAM(s) Oral every 4 hours PRN Moderate Pain (4 - 6)    Standing:  ARIPiprazole 5 milliGRAM(s) Oral at bedtime  aspirin  chewable 81 milliGRAM(s) Oral daily  atorvastatin 80 milliGRAM(s) Oral at bedtime  chlorhexidine 4% Liquid 1 Application(s) Topical daily  clonazePAM  Tablet 1 milliGRAM(s) Oral three times a day  gabapentin 300 milliGRAM(s) Oral two times a day  lactobacillus acidophilus 1 Tablet(s) Oral daily  levothyroxine 100 MICROGram(s) Oral daily  losartan 50 milliGRAM(s) Oral daily  montelukast 10 milliGRAM(s) Oral daily  mupirocin 2% Nasal 1 Application(s) Both Nostrils two times a day  pantoprazole    Tablet 40 milliGRAM(s) Oral before breakfast  polyethylene glycol 3350 17 Gram(s) Oral two times a day  senna 1 Tablet(s) Oral daily    Home:  Acidophilus oral tablet: 1 tab(s) orally once a day  albuterol 90 mcg/inh inhalation aerosol: 2 puff(s) inhaled every 6 hours As needed Shortness of Breath and/or Wheezing  ARIPiprazole 5 mg oral tablet: 1 tab(s) orally once a day (at bedtime)  aspirin 81 mg oral tablet, chewable: 1 tab(s) orally once a day  atorvastatin 80 mg oral tablet: 1 tab(s) orally once a day (at bedtime)  biotin: 10511 mcg orally once a day  buPROPion 75 mg oral tablet: 1 orally once a day  clonazePAM 1 mg oral tablet: 1 tab(s) orally 3 times a day  doxepin: 50 milligram(s) orally once a day (at bedtime)  gabapentin 300 mg oral capsule: 1 cap(s) orally 2 times a day  levothyroxine 100 mcg (0.1 mg) oral tablet: 1 tab(s) orally once a day  methylphenidate 27 mg/24 hr oral tablet, extended release: 1 orally once a day  montelukast 10 mg oral tablet: 1 tab(s) orally once a day  Viactive dietary supplement: 1 chewable tab orally once a day LD 10/22/2023    Vitals:  T(C): 36.2, Max: 37.7 (23 @ 20:18)  T(F): 97.2, Max: 99.8 (23 @ 20:18)  HR: 76 (75 - 91)  BP: 111/72 (111/72 - 174/96)  RR: 18 (18 - 18)  SpO2: 95% (95% - 98%)    Physical Exam:  General: Awake, Alert. Not in acute distress.  HEENT: Normocephalic atraumatic, neck supple, PERRLA, EOMI, no scleral icterus, tracheostomy   Heart: RRR; S1, S2; No murmurs.  Lungs: Clear to auscultation bilaterally.  Abdomen: Soft, nontender, mildly distended.  Extremities: R hand in sling, R arm edema, arms neurovascularly intact b/l, palpable radial pulse b/l,  good motor strength in hands b/l, intact sensation in hands b/l. LE 1+ edema b/l.   Neuro: AAOx3, No focal deficits.    Labs:  CBC (:27)                        Hgb: 10.6   WBC: 5.76  )-----------------( Plts: 243                              Hct: 31.4     Chem (:27)  Na: 139  |     Cl: 102     |  BUN: 11  -----------------------------------------< Gluc: 104    K: 4.4   |    HCO3: 26  |  Cr: 0.68    Ca 9.4 ( @ 06:27)  Phos 3.9 (:27)  Mg 2.2 (:27)    LFTs (11-03 @ 06:27)  TPro 6.2  /  Alb 3.9  TBili 0.4  /  DBili     AST 26  /  ALT 30  /  AlkPhos 79      
Medical Student Note    Patient Information:  DEEPTHI RIBEIRO / 72y / Female / MRN#:[id number]    Hospital Day: 2d    Interval History:  Patient seen and examined at bedside. No acute events overnight. Patient states she has R arm pain, 9/10, located at site of surgery. Denies any paresthesias of arm or numbness. Patient has not had BM in 3 days but has been passing flatulence. No abdominal pain, nausea, vomiting, or burping.     HPI:  72y F pmh GERD, CVA, laryngeal CA s/p trach, hypothyroidism, breast CA presents to ED c/o fatigue iso low sodium on outpatient preop labs.  Patient had mechanical fall on , was eval at outside hospital, found to have right elbow fracture, and is scheduled to have surgery with outpatient orthopedics from [facility]. Pre-op labs obtained by PMD (Dr Mora) but pt noted to be hyponatremic  so was sent to ED by PMD,  Pt reports hx of  hypoNa 1x in the past and endorses that she drinks ~3L water per day.  Says she started a new BP med 2 wk ago.       ROS: Denies CP, SOB, palpitation, N/V/D, fever, cough, chills, dizziness, abm pain, recent travel, sick contact, change in urinary habits     A 10-system ROS was performed and is negative except as noted above and/or in the HPI.    ED: Labs and imaging obtained. Ortho also was consulted by ED       Past Medical History:  Depression    Fibromyalgia    Asthma    Herniated Disc    Hypothyroid    Breast Cancer    Anxiety    Osteoporosis    Osteoarthritis    Patellar Fracture    Hypoglycemia    Obstructive Sleep Apnea    Environmental Allergies    Vocal cord nodule    Laryngeal cancer    Arthritis    Neuropathy    Diverticulitis    GERD (gastroesophageal reflux disease)    CVA (cerebrovascular accident)    Age-related nuclear cataract, right eye      Past Surgical History:   Section    S/P Cholecystectomy    S/P Lumpectomy of Breast    Anal Abscess    Cosmetic Surgery    S/P Gastric Bypass    S/P carpal tunnel release    S/P mastectomy, bilateral    H/O breast surgery    S/P excision of vocal cord nodule    History of laryngoscopy    S/P laryngectomy    History of loop recorder      Allergies:  sulfa drugs (Other)    Medications:  PRN:  acetaminophen     Tablet .. 650 milliGRAM(s) Oral every 6 hours PRN Temp greater or equal to 38C (100.4F), Mild Pain (1 - 3)  albuterol    90 MICROgram(s) HFA Inhaler 2 Puff(s) Inhalation every 6 hours PRN Shortness of Breath and/or Wheezing  aluminum hydroxide/magnesium hydroxide/simethicone Suspension 30 milliLiter(s) Oral every 4 hours PRN Dyspepsia  melatonin 3 milliGRAM(s) Oral at bedtime PRN Insomnia  ondansetron Injectable 4 milliGRAM(s) IV Push every 8 hours PRN Nausea and/or Vomiting  oxyCODONE    IR 10 milliGRAM(s) Oral every 4 hours PRN Severe Pain (7 - 10)  oxyCODONE    IR 5 milliGRAM(s) Oral every 4 hours PRN Moderate Pain (4 - 6)    Standing:  ARIPiprazole 5 milliGRAM(s) Oral at bedtime  aspirin  chewable 81 milliGRAM(s) Oral daily  atorvastatin 80 milliGRAM(s) Oral at bedtime  chlorhexidine 4% Liquid 1 Application(s) Topical daily  clonazePAM  Tablet 1 milliGRAM(s) Oral three times a day  gabapentin 300 milliGRAM(s) Oral two times a day  lactobacillus acidophilus 1 Tablet(s) Oral daily  levothyroxine 100 MICROGram(s) Oral daily  losartan 50 milliGRAM(s) Oral daily  montelukast 10 milliGRAM(s) Oral daily  mupirocin 2% Nasal 1 Application(s) Both Nostrils two times a day  pantoprazole    Tablet 40 milliGRAM(s) Oral before breakfast  polyethylene glycol 3350 17 Gram(s) Oral two times a day  senna 1 Tablet(s) Oral daily    Home:  Acidophilus oral tablet: 1 tab(s) orally once a day  ARIPiprazole 5 mg oral tablet: 1 orally once a day (at bedtime)  aspirin 81 mg oral tablet, chewable: 1 tab(s) orally once a day  atorvastatin 80 mg oral tablet: 1 tab(s) orally once a day  biotin: 34704 mcg orally once a day  buPROPion 75 mg oral tablet: 1 orally once a day  clonazePAM 1 mg oral tablet: 1 tab(s) orally 3 times a day  doxepin: 50 milligram(s) orally once a day (at bedtime)  gabapentin 300 mg oral capsule: 1 cap(s) orally 2 times a day  levothyroxine 100 mcg (0.1 mg) oral tablet: 1 tab(s) orally once a day  methylphenidate 27 mg/24 hr oral tablet, extended release: 1 orally once a day  montelukast 10 mg oral tablet: 1 tab(s) orally once a day  olmesartan-hydrochlorothiazide 20 mg-12.5 mg oral tablet: 1 tab(s) orally once a day  ProAir HFA 90 mcg/inh inhalation aerosol: 2 puff(s) inhaled every 6 hours as needed  Viactive dietary supplement: 1 chewable tab orally once a day LD 10/22/2023    Vitals:  T(C): 36.4, Max: 36.6 (23 @ 20:50)  T(F): 97.5, Max: 97.9 (23 @ 20:50)  HR: 73 (64 - 92)  BP: 162/82 (119/60 - 162/82)  RR: 18 (14 - 20)  SpO2: 99% (93% - 99%)    Physical Exam:  General: Awake, Alert. Not in acute distress.  HEENT: Normocephalic atraumatic, PERRLA, EOMI, no scleral icterus or injection, trach  Heart: RRR; S1, S2; No murmurs.  Lungs: Clear to auscultation bilaterally.  Abdomen: Soft, nontender, nondistended.  Extremities: R arm: in sling, motor function intact, sensation intact, palpable pulses, edema in hand and forearm. L arm: motor and sensation intact, no edema, palpable pulses. Lower extremities: B/L 1+ pitting edema   Neuro: AAOx3, No focal deficits.    Labs:  CBC (:)                        Hgb: 11.1   WBC: 9.59  )-----------------( Plts: 242                              Hct: 32.6     Chem (:)  Na: 132  |     Cl: 98     |  BUN: 11  -----------------------------------------< Gluc: 185    K: 4.6   |    HCO3: 21  |  Cr: 0.66    Ca 8.9 ( @ 11:27)  Phos 2.8 (:)  Mg 2.1 (:)    LFTs (:27)  TPro 6.5  /  Alb 4.0  TBili 0.4  /  DBili     AST 31  /  ALT 34  /  AlkPhos 90        PT/INR ( @ 02:59)  PT: 10.6 ; INR: 0.96   PTT: 32.1

## 2023-11-03 NOTE — PROGRESS NOTE ADULT - PROBLEM SELECTOR PLAN 1
S/p mechanical fall 2days ago  - XR Rt arm & elbow: Acute intra-articular fracture through the olecranon process of the ulna  - Prn pain control   - S/p ORIF R olecranon 11/1  - Ortho consulted; f/u recs  - F/u PT/OT  - Pain regimen: moderate Tylenol q6 moderate, oxy 5 q4 moderate, oxy 10 q4 severe  - Bowel regimen: Senna + Miralax

## 2023-11-03 NOTE — DISCHARGE NOTE NURSING/CASE MANAGEMENT/SOCIAL WORK - NSDCPEFALRISK_GEN_ALL_CORE
For information on Fall & Injury Prevention, visit: [email or web address] OR  [email or web address] OR  https://www.cdc.gov/steadi/patient.html

## 2023-11-03 NOTE — PROGRESS NOTE ADULT - SUBJECTIVE AND OBJECTIVE BOX
Subjective: Patient seen and examined. No new events except as noted.     REVIEW OF SYSTEMS:    CONSTITUTIONAL: + weakness, fevers or chills  EYES/ENT: No visual changes;  No vertigo or throat pain   NECK: No pain or stiffness  RESPIRATORY: No cough, wheezing, hemoptysis; No shortness of breath  CARDIOVASCULAR: No chest pain or palpitations  GASTROINTESTINAL: No abdominal or epigastric pain. No nausea, vomiting, or hematemesis; No diarrhea or constipation. No melena or hematochezia.  GENITOURINARY: No dysuria, frequency or hematuria  NEUROLOGICAL: No numbness or weakness  SKIN: No itching, burning, rashes, or lesions   All other review of systems is negative unless indicated above.    MEDICATIONS:  MEDICATIONS  (STANDING):  ARIPiprazole 5 milliGRAM(s) Oral at bedtime  aspirin  chewable 81 milliGRAM(s) Oral daily  atorvastatin 80 milliGRAM(s) Oral at bedtime  chlorhexidine 4% Liquid 1 Application(s) Topical daily  clonazePAM  Tablet 1 milliGRAM(s) Oral three times a day  gabapentin 300 milliGRAM(s) Oral two times a day  lactobacillus acidophilus 1 Tablet(s) Oral daily  levothyroxine 100 MICROGram(s) Oral daily  losartan 50 milliGRAM(s) Oral daily  montelukast 10 milliGRAM(s) Oral daily  mupirocin 2% Nasal 1 Application(s) Both Nostrils two times a day  pantoprazole    Tablet 40 milliGRAM(s) Oral before breakfast  polyethylene glycol 3350 17 Gram(s) Oral two times a day  senna 1 Tablet(s) Oral daily      PHYSICAL EXAM:  T(C): 36.2 (11-03-23 @ 05:46), Max: 37.7 (11-02-23 @ 20:18)  HR: 76 (11-03-23 @ 05:46) (75 - 91)  BP: 111/72 (11-03-23 @ 05:46) (111/72 - 174/96)  RR: 18 (11-03-23 @ 05:46) (18 - 18)  SpO2: 95% (11-03-23 @ 05:46) (95% - 98%)  Wt(kg): --  I&O's Summary        Appearance: NAD + trach  HEENT:   Dry  oral mucosa, PERRL, EOMI	  Lymphatic: No lymphadenopathy  Cardiovascular: Normal S1 S2, No JVD, No murmurs, No edema  Respiratory: decreased bs  Psychiatry: A & O x 3, Mood & affect appropriate  Gastrointestinal:  Soft, Non-tender, + BS	  Skin: No rashes, No ecchymoses, No cyanosis	  Neurologic: Non-focal  Ext: NWB RUE in a posterior slab splint      LABS:    CARDIAC MARKERS:                                10.6   5.76  )-----------( 243      ( 03 Nov 2023 06:27 )             31.4     11-03    139  |  102  |  11  ----------------------------<  104<H>  4.4   |  26  |  0.68    Ca    9.4      03 Nov 2023 06:27  Phos  3.9     11-03  Mg     2.2     11-03    TPro  6.2  /  Alb  3.9  /  TBili  0.4  /  DBili  x   /  AST  26  /  ALT  30  /  AlkPhos  79  11-03    proBNP:   Lipid Profile:   HgA1c:   TSH:     Negative          TELEMETRY:   ECG:  	  RADIOLOGY:   DIAGNOSTIC TESTING:  [ ] Echocardiogram:  [ ]  Catheterization:  [ ] Stress Test:    OTHER:

## 2023-11-06 NOTE — CHART NOTE - NSCHARTNOTEFT_GEN_A_CORE
Resting without complaints.  No Chest Pain, SOB, N/V.    T(C): 36.5 (11-01-23 @ 12:00), Max: 37 (10-31-23 @ 15:08)  HR: 64 (11-01-23 @ 13:00) (64 - 103)  BP: 119/60 (11-01-23 @ 13:00) (116/54 - 163/76)  RR: 15 (11-01-23 @ 13:00) (14 - 20)  SpO2: 93% (11-01-23 @ 13:00) (93% - 100%)      Exam:  Alert and Tye, No Acute Distress  Card: +S1/S2, RRR  Pulm: CTAB  Laterality: R Elbow  Soft dressing in sling c/d/i  Compartments soft and compressible  Unable to assess motor/sensory response at this time 2/2 nerve block in effect  + Radial pulse    Xray: Intra-Op Fluoroscopy: S/P R Olecranon Fx ORIF, hardware in place and intact with no signs of new Fx,.                          11.0   5.26  )-----------( 176      ( 01 Nov 2023 02:59 )             32.0    11-01    135  |  99  |  9   ----------------------------<  100<H>  4.6   |  25  |  0.60    Ca    9.6      01 Nov 2023 02:59    TPro  6.8  /  Alb  3.9  /  TBili  0.3  /  DBili  x   /  AST  48<H>  /  ALT  45  /  AlkPhos  91  10-31      A/P: 72y Female S/p R Olecranon Fx ORIF. VSS. NAD  -PT/OT-NWB RUE in Sling, ROM as tolerated  -FU Am labs tomorrow  -IS  -DVT PPx: Venodynes  -Pain Control  -Continue Current Tx as per primary team (Medicine)  -Dispo planning PACU back to room    Aiden Martinez PA-C  Orthopedic Surgery Team  Team Pager #[phone]
Left 1 message for patient in regards to follow up care with callback information.
Left 2 messages for patient in regards to follow up care with callback information.
PDI	Current Rx	Drug Type	Rx Written	Rx Dispensed	Drug	Quantity	Days Supply	Prescriber Name	Prescriber BOBBY #	Payment Method	Dispenser  A	N	O	10/29/2023	10/29/2023	oxycodone hcl (ir) 5 mg tablet	10	3	Omid Lee	[id number]	Medicare	[id number]  A	Y	S	08/21/2023	08/21/2023	methylphenidate er 27 mg tab	90	90	Geri Bender E DO	[id number]	Medicare	[facility]  A	N	B	08/21/2023	08/21/2023	clonazepam 1 mg tablet	90	30	Geri Bender E DO	[id number]	Medicare	[facility]  A	N	S	06/19/2023	07/11/2023	methylphenidate er 27 mg tab	30	30	Geri Bender E DO	[id number]	Medicare	[facility]  A	N	S	06/13/2023	06/16/2023	methylphenidate er 27 mg tab	30	30	Geri Bender E DO	[id number]	Medicare	[id number]  A	N	S	06/15/2023	06/16/2023	methylphenidate er 18 mg tab	30	30	Geri Bender E DO	[id number]	Medicare	[id number]  A	N	B	06/05/2023	06/05/2023	clonazepam 1 mg tablet	90	30	NapoleonGeri sawyer E DO	[id number]	Medicare	[facility]  A	N	B	03/24/2023	04/18/2023	clonazepam 2 mg tablet	90	30	Geri Bender E DO	[id number]	Medicare	[id number]  A	N	O	03/29/2023	03/29/2023	tramadol hcl 50 mg tablet	21	7	Cole Stewart MD	[id number]	Medicare	[id number]  A	N	S	03/21/2023	03/24/2023	methylphenidate er 27 mg tab	90	90	Geri Bender E DO	[id number]	Medicare	[facility]  A	N	B	03/21/2023	03/24/2023	clonazepam 2 mg tablet	90	30	[place] sawyer E DO	[id number]	Medicare	[facility]  A	N	O	03/16/2023	03/17/2023	tramadol hcl 50 mg tablet	21	7	Paul Mcdermott	[id number]	Medicare	[id number]  A	N	S	12/20/2022	12/23/2022	methylphenidate er 27 mg tab	30	30	Geri Bender E DO	[id number]	Medicare	[id number]  A	N	O	12/22/2022	12/23/2022	hydrocodone-chlorphen er susp	100ml	10	Callie Trent [id number]	[id number]  A	N	B	12/19/2022	12/21/2022	clonazepam 2 mg tablet	90	30	Geri Bender E DO	[id number]	Medicare	[facility]  A	N	O	12/20/2022	12/20/2022	hydrocodone-chlorphen er susp	60ml	6	Callie Trent [id number]	[id number]

## 2023-11-09 RX ORDER — OXYCODONE 5 MG/1
5 TABLET ORAL
Qty: 5 | Refills: 0 | Status: ACTIVE | COMMUNITY
Start: 2023-11-09 | End: 1900-01-01

## 2023-11-13 PROCEDURE — 85610 PROTHROMBIN TIME: CPT

## 2023-11-13 PROCEDURE — 73060 X-RAY EXAM OF HUMERUS: CPT

## 2023-11-13 PROCEDURE — 36415 COLL VENOUS BLD VENIPUNCTURE: CPT

## 2023-11-13 PROCEDURE — 86850 RBC ANTIBODY SCREEN: CPT

## 2023-11-13 PROCEDURE — 84100 ASSAY OF PHOSPHORUS: CPT

## 2023-11-13 PROCEDURE — 97161 PT EVAL LOW COMPLEX 20 MIN: CPT

## 2023-11-13 PROCEDURE — 84443 ASSAY THYROID STIM HORMONE: CPT

## 2023-11-13 PROCEDURE — 73070 X-RAY EXAM OF ELBOW: CPT

## 2023-11-13 PROCEDURE — 86900 BLOOD TYPING SEROLOGIC ABO: CPT

## 2023-11-13 PROCEDURE — 84300 ASSAY OF URINE SODIUM: CPT

## 2023-11-13 PROCEDURE — 81001 URINALYSIS AUTO W/SCOPE: CPT

## 2023-11-13 PROCEDURE — 99285 EMERGENCY DEPT VISIT HI MDM: CPT | Mod: 25

## 2023-11-13 PROCEDURE — 73090 X-RAY EXAM OF FOREARM: CPT

## 2023-11-13 PROCEDURE — 85025 COMPLETE CBC W/AUTO DIFF WBC: CPT

## 2023-11-13 PROCEDURE — 76000 FLUOROSCOPY <1 HR PHYS/QHP: CPT

## 2023-11-13 PROCEDURE — 83935 ASSAY OF URINE OSMOLALITY: CPT

## 2023-11-13 PROCEDURE — 73080 X-RAY EXAM OF ELBOW: CPT

## 2023-11-13 PROCEDURE — 87641 MR-STAPH DNA AMP PROBE: CPT

## 2023-11-13 PROCEDURE — 85730 THROMBOPLASTIN TIME PARTIAL: CPT

## 2023-11-13 PROCEDURE — 74019 RADEX ABDOMEN 2 VIEWS: CPT

## 2023-11-13 PROCEDURE — 71045 X-RAY EXAM CHEST 1 VIEW: CPT

## 2023-11-13 PROCEDURE — 82570 ASSAY OF URINE CREATININE: CPT

## 2023-11-13 PROCEDURE — 84439 ASSAY OF FREE THYROXINE: CPT

## 2023-11-13 PROCEDURE — 86901 BLOOD TYPING SEROLOGIC RH(D): CPT

## 2023-11-13 PROCEDURE — 97165 OT EVAL LOW COMPLEX 30 MIN: CPT

## 2023-11-13 PROCEDURE — 83735 ASSAY OF MAGNESIUM: CPT

## 2023-11-13 PROCEDURE — C1889: CPT

## 2023-11-13 PROCEDURE — C1713: CPT

## 2023-11-13 PROCEDURE — 87640 STAPH A DNA AMP PROBE: CPT

## 2023-11-13 PROCEDURE — 80053 COMPREHEN METABOLIC PANEL: CPT

## 2023-11-13 PROCEDURE — 85027 COMPLETE CBC AUTOMATED: CPT

## 2023-11-13 PROCEDURE — 80048 BASIC METABOLIC PNL TOTAL CA: CPT

## 2023-11-16 ENCOUNTER — APPOINTMENT (OUTPATIENT)
Dept: ORTHOPEDIC SURGERY | Facility: CLINIC | Age: 73
End: 2023-11-16
Payer: MEDICARE

## 2023-11-16 VITALS — WEIGHT: 167 LBS | HEIGHT: 63 IN | BODY MASS INDEX: 29.59 KG/M2

## 2023-11-16 PROCEDURE — 99024 POSTOP FOLLOW-UP VISIT: CPT

## 2023-11-16 PROCEDURE — 73080 X-RAY EXAM OF ELBOW: CPT | Mod: RT

## 2023-12-13 ENCOUNTER — APPOINTMENT (OUTPATIENT)
Dept: OTOLARYNGOLOGY | Facility: CLINIC | Age: 73
End: 2023-12-13
Payer: MEDICARE

## 2023-12-13 PROCEDURE — 92597 ORAL SPEECH DEVICE EVAL: CPT | Mod: GN

## 2023-12-13 PROCEDURE — 92610 EVALUATE SWALLOWING FUNCTION: CPT | Mod: GN

## 2023-12-28 ENCOUNTER — APPOINTMENT (OUTPATIENT)
Dept: ORTHOPEDIC SURGERY | Facility: CLINIC | Age: 73
End: 2023-12-28

## 2024-01-18 ENCOUNTER — APPOINTMENT (OUTPATIENT)
Dept: ORTHOPEDIC SURGERY | Facility: CLINIC | Age: 74
End: 2024-01-18
Payer: MEDICARE

## 2024-01-18 VITALS — HEIGHT: 63 IN | WEIGHT: 167 LBS | BODY MASS INDEX: 29.59 KG/M2

## 2024-01-18 DIAGNOSIS — S52.023A DISPLACED FRACTURE OF OLECRANON PROCESS W/OUT INTRAARTICULAR EXTENSION OF UNSPECIFIED ULNA, INITIAL ENCOUNTER FOR CLOSED FRACTURE: ICD-10-CM

## 2024-01-18 PROCEDURE — 73080 X-RAY EXAM OF ELBOW: CPT | Mod: RT

## 2024-01-18 PROCEDURE — 99213 OFFICE O/P EST LOW 20 MIN: CPT | Mod: 24

## 2024-01-18 NOTE — ADDENDUM
[FreeTextEntry1] :  I, Sb Delcid wrote this note acting as a scribe for Dr. Yadiel Park on Jan 18, 2024.

## 2024-01-18 NOTE — HISTORY OF PRESENT ILLNESS
[de-identified] : 3rd POA s/p Open reduction internal fixation of right olecranon.   [de-identified] : The patient is a 73 year female who returns for the 3rd postoperative visit after undergoing Open reduction internal fixation of right olecranon at [facility]. The surgery was on 11/01/2023. The patient is recovering at home. She reports mild postoperative pain. [de-identified] : Patient is WDWN, alert, and in no acute distress. Breathing is unlabored. She is grossly oriented to person, place, and time.   She was accompanied by Her  today.  Right Arm:  Incision is healing well. There are no signs of infection. Normal amount of postoperative edema and tenderness present.  ROM:   [de-identified] :  AP, lateral and oblique views of the right elbow were obtained today and revealed no abnormalities and well alignment; intact hardware: 1.6 mm K-wires x2 from the [employer] small fragment set and 18-gauge tension band wire. Fracture healing well.   [de-identified] : Right Elbow: 30- 130 degrees normal sensation  full rotation [de-identified] :  The underlying pathophysiology was reviewed with the patient. XR films were reviewed with the patient. Discussed at length the nature of the patient's condition.   The patient was advised for ROM and strengthen exercises to do at home.   The patient was encouraged to take Calcium Citrate, Vitamin D3 and Vitamin C along with a high calcium diet is advised to promote bone health and healing.  The patient may take OTC medication and topical analgesic for pain management.   All questions answered, understanding verbalized. Patient in agreement with plan of care.   Patient was advised to follow up as needed.

## 2024-01-18 NOTE — END OF VISIT
[FreeTextEntry3] :  All medical record entries made by the Scribe were at my,  Dr. Yadiel Park MD., direction and personally dictated by me on 01/18/2024. I have personally reviewed the chart and agree that the record accurately reflects my personal performance of the history, physical exam, assessment and plan.

## 2024-01-24 ENCOUNTER — APPOINTMENT (OUTPATIENT)
Dept: OTOLARYNGOLOGY | Facility: CLINIC | Age: 74
End: 2024-01-24
Payer: MEDICARE

## 2024-01-24 PROCEDURE — 92524 BEHAVRAL QUALIT ANALYS VOICE: CPT | Mod: GN

## 2024-01-24 PROCEDURE — 92597 ORAL SPEECH DEVICE EVAL: CPT | Mod: GN

## 2024-03-01 ENCOUNTER — OUTPATIENT (OUTPATIENT)
Dept: OUTPATIENT SERVICES | Facility: HOSPITAL | Age: 74
LOS: 1 days | End: 2024-03-01

## 2024-03-01 VITALS
DIASTOLIC BLOOD PRESSURE: 70 MMHG | RESPIRATION RATE: 16 BRPM | TEMPERATURE: 99 F | OXYGEN SATURATION: 99 % | HEIGHT: 63 IN | HEART RATE: 82 BPM | WEIGHT: 173.06 LBS | SYSTOLIC BLOOD PRESSURE: 120 MMHG

## 2024-03-01 DIAGNOSIS — H25.811 COMBINED FORMS OF AGE-RELATED CATARACT, RIGHT EYE: ICD-10-CM

## 2024-03-01 DIAGNOSIS — F41.9 ANXIETY DISORDER, UNSPECIFIED: ICD-10-CM

## 2024-03-01 DIAGNOSIS — Z90.02 ACQUIRED ABSENCE OF LARYNX: Chronic | ICD-10-CM

## 2024-03-01 DIAGNOSIS — I10 ESSENTIAL (PRIMARY) HYPERTENSION: ICD-10-CM

## 2024-03-01 DIAGNOSIS — E03.9 HYPOTHYROIDISM, UNSPECIFIED: ICD-10-CM

## 2024-03-01 DIAGNOSIS — Z98.89 OTHER SPECIFIED POSTPROCEDURAL STATES: Chronic | ICD-10-CM

## 2024-03-01 DIAGNOSIS — Z90.13 ACQUIRED ABSENCE OF BILATERAL BREASTS AND NIPPLES: Chronic | ICD-10-CM

## 2024-03-01 DIAGNOSIS — Z98.890 OTHER SPECIFIED POSTPROCEDURAL STATES: Chronic | ICD-10-CM

## 2024-03-01 DIAGNOSIS — E78.5 HYPERLIPIDEMIA, UNSPECIFIED: ICD-10-CM

## 2024-03-01 DIAGNOSIS — Z93.0 TRACHEOSTOMY STATUS: ICD-10-CM

## 2024-03-01 DIAGNOSIS — I63.9 CEREBRAL INFARCTION, UNSPECIFIED: ICD-10-CM

## 2024-03-01 RX ORDER — BUPROPION HYDROCHLORIDE 150 MG/1
1 TABLET, EXTENDED RELEASE ORAL
Refills: 0 | DISCHARGE

## 2024-03-01 RX ORDER — SODIUM CHLORIDE 9 MG/ML
1000 INJECTION, SOLUTION INTRAVENOUS
Refills: 0 | Status: DISCONTINUED | OUTPATIENT
Start: 2024-03-06 | End: 2024-03-06

## 2024-03-01 RX ORDER — DOXEPIN HCL 100 MG
50 CAPSULE ORAL
Qty: 0 | Refills: 0 | DISCHARGE

## 2024-03-01 RX ORDER — LACTOBACILLUS ACIDOPHILUS 100MM CELL
1 CAPSULE ORAL
Refills: 0 | DISCHARGE

## 2024-03-01 NOTE — H&P PST ADULT - PROBLEM SELECTOR PLAN 1
Patient tentatively scheduled for cataract extraction with intraocular lens implant on 3/6/24.  Pre-op instructions provided. Pt given verbal and written instructions with teach back on chlorhexidine wash and pepcid. Pt verbalized understanding with return demonstration.  AGUSTÍN precautions,    CBC from 11/3/23 - H/H 10.6/31.4, Platelet 243   CMP from 2/27/24: Na 134, K 5.4,   Patient obtained medication evaluation- patient is in optimal medical condition with out any medical contraindications.

## 2024-03-01 NOTE — H&P PST ADULT - PROBLEM SELECTOR PLAN 4
Patient instructed to take Atorvastatin with a sip of water on the morning of procedure. Patient verbalized understanding.

## 2024-03-01 NOTE — H&P PST ADULT - PROBLEM SELECTOR PLAN 2
Patient instructed to take olmesartan with a sip of water on the morning of procedure. Patient verbalized understanding.

## 2024-03-01 NOTE — H&P PST ADULT - PROBLEM SELECTOR PLAN 7
Patient instructed to take levothyroxine with a sip of water on the morning of procedure. Patient verbalized understanding.

## 2024-03-01 NOTE — H&P PST ADULT - PROBLEM SELECTOR PLAN 3
Patient with h/o stroke 5/2023 - no Deficit. Follows up with neurologist.   patient on low dose Aspirin.

## 2024-03-01 NOTE — H&P PST ADULT - HISTORY OF PRESENT ILLNESS
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 Pt complaining of having blurriness and haziness of right eye for the last 6 months and diagnosed with right eye cataract. Pt denies right eye pain, eye discharge.    Surgery was initially scheduled on 10/25/23   but got cancelled as her Na levels was 125 mmol/l.  Patient followed up with PCP, Started on Sodium chloride tabs. Na level on 2/27/24 - 134mmol/l.  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 Pt complaining of having blurriness and haziness of right eye for the last 6 months and diagnosed with right eye cataract. Pt denies right eye pain, eye discharge.    Surgery was initially scheduled on 10/25/23   but got cancelled as her Na levels was 125 mmol/l.  Patient followed up with PCP, Started on Sodium chloride tabs. Na level on 2/27/24 - 134 mmol/l.

## 2024-03-01 NOTE — H&P PST ADULT - OTHER CARE PROVIDERS
Dr Lavelle Kan (Cardiologist)  [phone]                                                Dr Dada Cazares (neurologist) [phone]

## 2024-03-01 NOTE — H&P PST ADULT - PROBLEM SELECTOR PLAN 6
Patient with h/o laryngeal ca, s/p total laryngectomy in 2019 with tracheostomy in place.   Pt speaks with TEP valve

## 2024-03-04 RX ORDER — CYCLOPENTOLATE HYDROCHLORIDE 10 MG/ML
1 SOLUTION/ DROPS OPHTHALMIC
Refills: 0 | Status: DISCONTINUED | OUTPATIENT
Start: 2024-03-06 | End: 2024-03-06

## 2024-03-04 RX ORDER — PHENYLEPHRINE HCL 2.5 %
1 DROPS OPHTHALMIC (EYE)
Refills: 0 | Status: DISCONTINUED | OUTPATIENT
Start: 2024-03-06 | End: 2024-03-06

## 2024-03-05 ENCOUNTER — TRANSCRIPTION ENCOUNTER (OUTPATIENT)
Age: 74
End: 2024-03-05
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
## 2024-03-06 ENCOUNTER — TRANSCRIPTION ENCOUNTER (OUTPATIENT)
Age: 74
End: 2024-03-06

## 2024-03-06 ENCOUNTER — OUTPATIENT (OUTPATIENT)
Dept: OUTPATIENT SERVICES | Facility: HOSPITAL | Age: 74
LOS: 1 days | Discharge: HOME | End: 2024-03-06

## 2024-03-06 ENCOUNTER — APPOINTMENT (OUTPATIENT)
Dept: OPHTHALMOLOGY | Facility: HOSPITAL | Age: 74
End: 2024-03-06
Payer: MEDICARE

## 2024-03-06 VITALS
HEART RATE: 100 BPM | RESPIRATION RATE: 16 BRPM | OXYGEN SATURATION: 99 % | TEMPERATURE: 99 F | SYSTOLIC BLOOD PRESSURE: 136 MMHG | DIASTOLIC BLOOD PRESSURE: 48 MMHG

## 2024-03-06 VITALS
SYSTOLIC BLOOD PRESSURE: 111 MMHG | DIASTOLIC BLOOD PRESSURE: 61 MMHG | OXYGEN SATURATION: 96 % | HEIGHT: 63 IN | HEART RATE: 67 BPM | WEIGHT: 173.06 LBS | TEMPERATURE: 98 F | RESPIRATION RATE: 16 BRPM

## 2024-03-06 DIAGNOSIS — H25.811 COMBINED FORMS OF AGE-RELATED CATARACT, RIGHT EYE: ICD-10-CM

## 2024-03-06 DIAGNOSIS — Z90.13 ACQUIRED ABSENCE OF BILATERAL BREASTS AND NIPPLES: Chronic | ICD-10-CM

## 2024-03-06 DIAGNOSIS — Z98.89 OTHER SPECIFIED POSTPROCEDURAL STATES: Chronic | ICD-10-CM

## 2024-03-06 DIAGNOSIS — Z98.890 OTHER SPECIFIED POSTPROCEDURAL STATES: Chronic | ICD-10-CM

## 2024-03-06 DIAGNOSIS — Z90.02 ACQUIRED ABSENCE OF LARYNX: Chronic | ICD-10-CM

## 2024-03-06 PROCEDURE — 66984 XCAPSL CTRC RMVL W/O ECP: CPT | Mod: RT

## 2024-03-06 DEVICE — IMPLANTABLE DEVICE
Type: IMPLANTABLE DEVICE | Status: NON-FUNCTIONAL
Removed: 2024-03-06

## 2024-03-06 RX ORDER — TROPICAMIDE 1 %
1 DROPS OPHTHALMIC (EYE)
Refills: 0 | Status: DISCONTINUED | OUTPATIENT
Start: 2024-03-06 | End: 2024-03-06

## 2024-03-06 RX ORDER — ACETAMINOPHEN 500 MG
1000 TABLET ORAL ONCE
Refills: 0 | Status: COMPLETED | OUTPATIENT
Start: 2024-03-06 | End: 2024-03-06

## 2024-03-06 RX ADMIN — CYCLOPENTOLATE HYDROCHLORIDE 1 DROP(S): 10 SOLUTION/ DROPS OPHTHALMIC at 06:55

## 2024-03-06 RX ADMIN — Medication 1 DROP(S): at 07:31

## 2024-03-06 RX ADMIN — Medication 1 DROP(S): at 07:05

## 2024-03-06 RX ADMIN — Medication 1 DROP(S): at 07:24

## 2024-03-06 RX ADMIN — Medication 1000 MILLIGRAM(S): at 09:15

## 2024-03-06 RX ADMIN — SODIUM CHLORIDE 30 MILLILITER(S): 9 INJECTION, SOLUTION INTRAVENOUS at 07:06

## 2024-03-06 RX ADMIN — Medication 1 DROP(S): at 06:55

## 2024-03-06 RX ADMIN — Medication 400 MILLIGRAM(S): at 09:00

## 2024-03-06 RX ADMIN — Medication 1 DROP(S): at 07:18

## 2024-03-06 RX ADMIN — Medication 1 DROP(S): at 07:25

## 2024-03-06 RX ADMIN — CYCLOPENTOLATE HYDROCHLORIDE 1 DROP(S): 10 SOLUTION/ DROPS OPHTHALMIC at 07:15

## 2024-03-06 NOTE — ASU DISCHARGE PLAN (ADULT/PEDIATRIC) - CARE PROVIDER_API CALL
Cora Cesar  Ophthalmology  [address] [place] [address]  Phone: [phone]  Fax: [phone]  Follow Up Time:

## 2024-03-06 NOTE — ASU PREOP CHECKLIST - COMMENTS
sip of water ASA atorvastin, clonazepam gabbapentin levothyroixine olmesarten omeprozole NACL tablet, wellbutrin

## 2024-03-06 NOTE — ASU DISCHARGE PLAN (ADULT/PEDIATRIC) - NS MD DC FALL RISK RISK
For information on Fall & Injury Prevention, visit: [email or web address] OR  [email or web address] OR  https://www.cdc.gov/steadi/patient.html

## 2024-03-07 ENCOUNTER — NON-APPOINTMENT (OUTPATIENT)
Age: 74
End: 2024-03-07

## 2024-03-07 ENCOUNTER — APPOINTMENT (OUTPATIENT)
Dept: OPHTHALMOLOGY | Facility: CLINIC | Age: 74
End: 2024-03-07
Payer: MEDICARE

## 2024-03-07 PROCEDURE — 99024 POSTOP FOLLOW-UP VISIT: CPT

## 2024-03-14 ENCOUNTER — NON-APPOINTMENT (OUTPATIENT)
Age: 74
End: 2024-03-14

## 2024-03-14 ENCOUNTER — APPOINTMENT (OUTPATIENT)
Dept: OPHTHALMOLOGY | Facility: CLINIC | Age: 74
End: 2024-03-14
Payer: MEDICARE

## 2024-03-14 PROCEDURE — 99024 POSTOP FOLLOW-UP VISIT: CPT

## 2024-03-20 ENCOUNTER — APPOINTMENT (OUTPATIENT)
Dept: OTOLARYNGOLOGY | Facility: CLINIC | Age: 74
End: 2024-03-20
Payer: MEDICARE

## 2024-03-20 PROCEDURE — 92597 ORAL SPEECH DEVICE EVAL: CPT | Mod: GN

## 2024-03-20 PROCEDURE — 92610 EVALUATE SWALLOWING FUNCTION: CPT | Mod: GN

## 2024-03-22 ENCOUNTER — APPOINTMENT (OUTPATIENT)
Dept: CARDIOLOGY | Facility: CLINIC | Age: 74
End: 2024-03-22

## 2024-04-02 ENCOUNTER — OUTPATIENT (OUTPATIENT)
Dept: OUTPATIENT SERVICES | Facility: HOSPITAL | Age: 74
LOS: 1 days | End: 2024-04-02

## 2024-04-02 VITALS
RESPIRATION RATE: 16 BRPM | HEART RATE: 89 BPM | SYSTOLIC BLOOD PRESSURE: 135 MMHG | DIASTOLIC BLOOD PRESSURE: 76 MMHG | HEIGHT: 63 IN | TEMPERATURE: 98 F | OXYGEN SATURATION: 98 % | WEIGHT: 179.02 LBS

## 2024-04-02 DIAGNOSIS — E87.1 HYPO-OSMOLALITY AND HYPONATREMIA: ICD-10-CM

## 2024-04-02 DIAGNOSIS — I10 ESSENTIAL (PRIMARY) HYPERTENSION: ICD-10-CM

## 2024-04-02 DIAGNOSIS — E03.9 HYPOTHYROIDISM, UNSPECIFIED: ICD-10-CM

## 2024-04-02 DIAGNOSIS — Z98.49 CATARACT EXTRACTION STATUS, UNSPECIFIED EYE: Chronic | ICD-10-CM

## 2024-04-02 DIAGNOSIS — Z98.89 OTHER SPECIFIED POSTPROCEDURAL STATES: Chronic | ICD-10-CM

## 2024-04-02 DIAGNOSIS — Z98.890 OTHER SPECIFIED POSTPROCEDURAL STATES: Chronic | ICD-10-CM

## 2024-04-02 DIAGNOSIS — Z93.0 TRACHEOSTOMY STATUS: Chronic | ICD-10-CM

## 2024-04-02 DIAGNOSIS — Z90.13 ACQUIRED ABSENCE OF BILATERAL BREASTS AND NIPPLES: Chronic | ICD-10-CM

## 2024-04-02 DIAGNOSIS — Z87.81 PERSONAL HISTORY OF (HEALED) TRAUMATIC FRACTURE: Chronic | ICD-10-CM

## 2024-04-02 DIAGNOSIS — H25.812 COMBINED FORMS OF AGE-RELATED CATARACT, LEFT EYE: ICD-10-CM

## 2024-04-02 DIAGNOSIS — Z90.02 ACQUIRED ABSENCE OF LARYNX: Chronic | ICD-10-CM

## 2024-04-02 DIAGNOSIS — Z86.69 PERSONAL HISTORY OF OTHER DISEASES OF THE NERVOUS SYSTEM AND SENSE ORGANS: ICD-10-CM

## 2024-04-02 LAB — SODIUM SERPL-SCNC: 136 MMOL/L — SIGNIFICANT CHANGE UP (ref 135–145)

## 2024-04-02 RX ORDER — SODIUM CHLORIDE 9 MG/ML
3 INJECTION INTRAMUSCULAR; INTRAVENOUS; SUBCUTANEOUS EVERY 8 HOURS
Refills: 0 | Status: DISCONTINUED | OUTPATIENT
Start: 2024-04-10 | End: 2024-04-24

## 2024-04-02 RX ORDER — SODIUM CHLORIDE 9 MG/ML
1000 INJECTION, SOLUTION INTRAVENOUS
Refills: 0 | Status: DISCONTINUED | OUTPATIENT
Start: 2024-04-10 | End: 2024-04-24

## 2024-04-02 NOTE — H&P PST ADULT - LAST CARDIAC ANGIOGRAM/IMAGING
"Patient: Zohreh T Julia    Procedure Summary       Date: 24 Room / Location: [facility] LABOR DELIVERY    Anesthesia Start: 457 Anesthesia Stop: 555    Procedure:  SECTION REPEAT (Abdomen) Diagnosis:       Previous  delivery, antepartum      (Previous  delivery, antepartum [O34.219])    Surgeons: Va Hampton MD Provider: Jax Milton CRNA    Anesthesia Type: general, spinal ASA Status: 2            Anesthesia Type: general, spinal    Vitals  Vitals Value Taken Time   /72 24 1515   Temp 97 °F (36.1 °C) 24 1515   Pulse 89 24 1515   Resp 18 24 1515   SpO2 99 % 24 1515           Post Anesthesia Care and Evaluation    Patient location during evaluation: PACU  Patient participation: complete - patient participated  Level of consciousness: awake and alert  Pain management: adequate    Airway patency: patent  Anesthetic complications: No anesthetic complications    Cardiovascular status: acceptable  Respiratory status: acceptable  Hydration status: acceptable    Comments: Blood pressure 119/72, pulse 89, temperature 97 °F (36.1 °C), temperature source Temporal, resp. rate 18, height 160 cm (63\"), weight 74.1 kg (163 lb 6.4 oz), SpO2 99%, currently breastfeeding.    Pt discharged from PACU based on juliocesar score >8    " Artificial Tears: One drop to both eyes 3-4 times daily. We recommend Systane or Refresh lubricating eye drops which can be found at any pharmacy. Denies

## 2024-04-02 NOTE — H&P PST ADULT - NSICDXPASTMEDICALHX_GEN_ALL_CORE_FT
PAST MEDICAL HISTORY:  Age-related nuclear cataract, right eye     Anxiety     Arthritis     Asthma no h/o recent exacerbation    Breast Cancer s/p bilateral mastectomy    CVA (cerebrovascular accident)     Depression     Diverticulitis history treated with ABT    Environmental Allergies dust, mold, mildew, cat dander,    Fibromyalgia     GERD (gastroesophageal reflux disease)     H/O fracture of arm     Herniated Disc     Hypothyroid     Laryngeal cancer October 2015 & had Radiation    Neuropathy lumbar herniated disc affecting feet    Osteoarthritis critera    Osteoporosis      PAST MEDICAL HISTORY:  Age-related nuclear cataract, right eye     Anxiety     Arthritis     Asthma no h/o recent exacerbation    Breast Cancer s/p bilateral mastectomy    CVA (cerebrovascular accident)     Depression     Diverticulitis history treated with ABT    Environmental Allergies dust, mold, mildew, cat dander,    Fibromyalgia     GERD (gastroesophageal reflux disease)     H/O fracture of arm     Herniated Disc     Hyponatremia     Hypothyroid     Laryngeal cancer October 2015 & had Radiation    Neuropathy lumbar herniated disc affecting feet    Osteoarthritis critera    Osteoporosis      PAST MEDICAL HISTORY:  Age-related nuclear cataract, right eye     Anxiety     Arthritis     Asthma no h/o recent exacerbation    Breast Cancer s/p bilateral mastectomy    CVA (cerebrovascular accident)     Depression     Diverticulitis history treated with ABT    Environmental Allergies dust, mold, mildew, cat dander,    Fibromyalgia     GERD (gastroesophageal reflux disease)     H/O fracture of arm     Herniated Disc     HTN (hypertension)     Hyponatremia     Hypothyroid     Laryngeal cancer October 2015 & had Radiation    Neuropathy lumbar herniated disc affecting feet    Osteoarthritis critera    Osteoporosis

## 2024-04-02 NOTE — H&P PST ADULT - NSICDXFAMILYHX_GEN_ALL_CORE_FT
FAMILY HISTORY:  Father  Still living? Unknown  Family history of stroke, Age at diagnosis: Age Unknown    Mother  Still living? Unknown  Family history of breast cancer, Age at diagnosis: Age Unknown  Family history of liver cancer, Age at diagnosis: Age Unknown    Sibling  Still living? Unknown  FH: coronary artery disease, Age at diagnosis: Age Unknown  FH: HTN (hypertension), Age at diagnosis: Age Unknown

## 2024-04-02 NOTE — H&P PST ADULT - NSICDXPASTSURGICALHX_GEN_ALL_CORE_FT
PAST SURGICAL HISTORY:  Anal Abscess      Section X 2    Cosmetic Surgery liposuction abdomen 11/24/10    H/O breast surgery Reconstructive surgery - Right & Left    H/O fracture of arm     History of cataract extraction     History of laryngoscopy excision of lesion & biopsy 2018    History of loop recorder     S/P carpal tunnel release Bilateral    S/P Cholecystectomy     S/P excision of vocal cord nodule 2015    S/P Gastric Bypass  Dr. Gibson    S/P laryngectomy     S/P Lumpectomy of Breast left     S/P mastectomy, bilateral     S/P trigger finger release

## 2024-04-02 NOTE — H&P PST ADULT - PROBLEM SELECTOR PLAN 1
Recommended observation. CBC from 1/11/24 in chart, BMP from 2/26/24 in chart, Sodium repeated (pt requesting due to concerns of hyponatremia which resulted in cancellation of prior surgery) Pre-op instructions provided. Pt verbalized understanding.   Pt to take own medication for GI ppx.   CBC from 1/11/24 in chart, BMP from 2/26/24 in chart, Sodium repeated (pt requesting due to concerns of hyponatremia which resulted in cancellation of prior surgery) Pre-op instructions provided. Pt verbalized understanding.   Pt to take own medication for GI ppx.   CBC from 1/11/24 in chart, BMP from 2/26/24 in chart, Sodium repeated (pt requesting due to concerns of hyponatremia which resulted in cancellation of prior surgery)    Pt states she remained on baby aspirin for prior cataract surgery and will do same for this time.

## 2024-04-02 NOTE — H&P PST ADULT - OTHER CARE PROVIDERS
Dr Lavelle Kan (Cardiologist)  [phone]    Garland Angelo (Rheum) [phone]   Dr Dada Cazares (neurologist) [phone]

## 2024-04-02 NOTE — H&P PST ADULT - HISTORY OF PRESENT ILLNESS
74 y/o female with h/o bilateral  cataracts s/p  right cataract surgery 3/6/24. 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 Pt complaining of having blurriness and haziness of right eye for the last 6 months and diagnosed with right eye cataract. Pt denies right eye pain, eye discharge.   74 y/o female with h/o bilateral  cataracts s/p  right cataract surgery 3/6/24. Pt presents today for presurgical evaluation for Cataract Extraction with Intraocular Lens Implant Left Eye.

## 2024-04-03 LAB
ADD ON TEST-SPECIMEN IN LAB: SIGNIFICANT CHANGE UP
HEMOLYSIS INDEX: 8 — SIGNIFICANT CHANGE UP
POTASSIUM SERPL-MCNC: 4.8 MMOL/L — SIGNIFICANT CHANGE UP (ref 3.5–5.3)
POTASSIUM SERPL-SCNC: 4.8 MMOL/L — SIGNIFICANT CHANGE UP (ref 3.5–5.3)

## 2024-04-04 ENCOUNTER — NON-APPOINTMENT (OUTPATIENT)
Age: 74
End: 2024-04-04

## 2024-04-04 ENCOUNTER — APPOINTMENT (OUTPATIENT)
Dept: OPHTHALMOLOGY | Facility: CLINIC | Age: 74
End: 2024-04-04
Payer: MEDICARE

## 2024-04-04 PROBLEM — I10 ESSENTIAL (PRIMARY) HYPERTENSION: Chronic | Status: ACTIVE | Noted: 2024-04-02

## 2024-04-04 PROBLEM — E87.1 HYPO-OSMOLALITY AND HYPONATREMIA: Chronic | Status: ACTIVE | Noted: 2024-04-02

## 2024-04-04 PROBLEM — Z87.81 PERSONAL HISTORY OF (HEALED) TRAUMATIC FRACTURE: Chronic | Status: ACTIVE | Noted: 2024-04-02

## 2024-04-04 PROCEDURE — 99024 POSTOP FOLLOW-UP VISIT: CPT

## 2024-04-08 NOTE — ED ADULT NURSE NOTE - CAS TRG GENERAL AIRWAY, MLM
Attempted to see this afternoon, was not in room. Will have SW try again later.     Ash Cevallos LMSW, [facility] Social Work Case Management   Phone: [phone]  Fax: [phone]    Patent

## 2024-04-09 ENCOUNTER — TRANSCRIPTION ENCOUNTER (OUTPATIENT)
Age: 74
End: 2024-04-09

## 2024-04-09 RX ORDER — TROPICAMIDE 1 %
1 DROPS OPHTHALMIC (EYE)
Refills: 0 | Status: DISCONTINUED | OUTPATIENT
Start: 2024-04-10 | End: 2024-04-16

## 2024-04-09 RX ORDER — PHENYLEPHRINE HCL 2.5 %
1 DROPS OPHTHALMIC (EYE)
Refills: 0 | Status: DISCONTINUED | OUTPATIENT
Start: 2024-04-10 | End: 2024-04-16

## 2024-04-09 RX ORDER — CYCLOPENTOLATE HYDROCHLORIDE 10 MG/ML
1 SOLUTION/ DROPS OPHTHALMIC
Refills: 0 | Status: DISCONTINUED | OUTPATIENT
Start: 2024-04-10 | End: 2024-04-16

## 2024-04-09 NOTE — ASU PATIENT PROFILE, ADULT - NSICDXPASTMEDICALHX_GEN_ALL_CORE_FT
PAST MEDICAL HISTORY:  Age-related nuclear cataract, right eye     Anxiety     Arthritis     Asthma no h/o recent exacerbation    Breast Cancer s/p bilateral mastectomy    CVA (cerebrovascular accident)     Depression     Diverticulitis history treated with ABT    Environmental Allergies dust, mold, mildew, cat dander,    Fibromyalgia     GERD (gastroesophageal reflux disease)     H/O fracture of arm     Herniated Disc     HTN (hypertension)     Hyponatremia     Hypothyroid     Laryngeal cancer October 2015 & had Radiation    Neuropathy lumbar herniated disc affecting feet    Osteoarthritis critera    Osteoporosis

## 2024-04-09 NOTE — ASU PATIENT PROFILE, ADULT - FALL HARM RISK - UNIVERSAL INTERVENTIONS
Bed in lowest position, wheels locked, appropriate side rails in place/Call bell, personal items and telephone in reach/Instruct patient to call for assistance before getting out of bed or chair/Non-slip footwear when patient is out of bed/[place] to call system/Physically safe environment - no spills, clutter or unnecessary equipment/Purposeful Proactive Rounding/Room/bathroom lighting operational, light cord in reach

## 2024-04-10 ENCOUNTER — TRANSCRIPTION ENCOUNTER (OUTPATIENT)
Age: 74
End: 2024-04-10

## 2024-04-10 ENCOUNTER — APPOINTMENT (OUTPATIENT)
Dept: OPHTHALMOLOGY | Facility: HOSPITAL | Age: 74
End: 2024-04-10
Payer: MEDICARE

## 2024-04-10 ENCOUNTER — OUTPATIENT (OUTPATIENT)
Dept: OUTPATIENT SERVICES | Facility: HOSPITAL | Age: 74
LOS: 1 days | Discharge: HOME | End: 2024-04-10

## 2024-04-10 VITALS
WEIGHT: 173.94 LBS | DIASTOLIC BLOOD PRESSURE: 54 MMHG | SYSTOLIC BLOOD PRESSURE: 115 MMHG | HEART RATE: 70 BPM | HEIGHT: 63 IN | OXYGEN SATURATION: 97 % | TEMPERATURE: 98 F | RESPIRATION RATE: 17 BRPM

## 2024-04-10 VITALS
DIASTOLIC BLOOD PRESSURE: 66 MMHG | SYSTOLIC BLOOD PRESSURE: 128 MMHG | RESPIRATION RATE: 18 BRPM | OXYGEN SATURATION: 97 % | HEART RATE: 80 BPM

## 2024-04-10 DIAGNOSIS — Z90.02 ACQUIRED ABSENCE OF LARYNX: Chronic | ICD-10-CM

## 2024-04-10 DIAGNOSIS — Z98.89 OTHER SPECIFIED POSTPROCEDURAL STATES: Chronic | ICD-10-CM

## 2024-04-10 DIAGNOSIS — Z90.13 ACQUIRED ABSENCE OF BILATERAL BREASTS AND NIPPLES: Chronic | ICD-10-CM

## 2024-04-10 DIAGNOSIS — Z98.890 OTHER SPECIFIED POSTPROCEDURAL STATES: Chronic | ICD-10-CM

## 2024-04-10 DIAGNOSIS — Z98.49 CATARACT EXTRACTION STATUS, UNSPECIFIED EYE: Chronic | ICD-10-CM

## 2024-04-10 DIAGNOSIS — H25.812 COMBINED FORMS OF AGE-RELATED CATARACT, LEFT EYE: ICD-10-CM

## 2024-04-10 DIAGNOSIS — Z87.81 PERSONAL HISTORY OF (HEALED) TRAUMATIC FRACTURE: Chronic | ICD-10-CM

## 2024-04-10 DIAGNOSIS — Z93.0 TRACHEOSTOMY STATUS: Chronic | ICD-10-CM

## 2024-04-10 PROCEDURE — 66984 XCAPSL CTRC RMVL W/O ECP: CPT | Mod: LT,79

## 2024-04-10 DEVICE — LENS IOL TECNIS EYHANCE DIB00 16.0D
Type: IMPLANTABLE DEVICE | Site: LEFT | Status: NON-FUNCTIONAL
Removed: 2024-04-10

## 2024-04-10 RX ORDER — PREGABALIN 225 MG/1
0 CAPSULE ORAL
Refills: 0 | DISCHARGE

## 2024-04-10 RX ORDER — METHYLPHENIDATE HCL 5 MG
1 TABLET ORAL
Refills: 0 | DISCHARGE

## 2024-04-10 RX ORDER — ATORVASTATIN CALCIUM 80 MG/1
1 TABLET, FILM COATED ORAL
Refills: 0 | DISCHARGE

## 2024-04-10 RX ORDER — OXYCODONE HYDROCHLORIDE 5 MG/1
5 TABLET ORAL ONCE
Refills: 0 | Status: DISCONTINUED | OUTPATIENT
Start: 2024-04-10 | End: 2024-04-10

## 2024-04-10 RX ORDER — PREDNISOLONE SODIUM PHOSPHATE 1 %
1 DROPS OPHTHALMIC (EYE)
Refills: 0 | DISCHARGE

## 2024-04-10 RX ORDER — SODIUM CHLORIDE 9 MG/ML
1 INJECTION INTRAMUSCULAR; INTRAVENOUS; SUBCUTANEOUS
Refills: 0 | DISCHARGE

## 2024-04-10 RX ORDER — TROPICAMIDE 1 %
1 DROPS OPHTHALMIC (EYE)
Refills: 0 | Status: DISCONTINUED | OUTPATIENT
Start: 2024-04-10 | End: 2024-04-24

## 2024-04-10 RX ORDER — ARIPIPRAZOLE 15 MG/1
1 TABLET ORAL
Refills: 0 | DISCHARGE

## 2024-04-10 RX ORDER — LACTOBACILLUS ACIDOPHILUS 100MM CELL
1 CAPSULE ORAL
Refills: 0 | DISCHARGE

## 2024-04-10 RX ORDER — ASPIRIN/CALCIUM CARB/MAGNESIUM 324 MG
1 TABLET ORAL
Refills: 0 | DISCHARGE

## 2024-04-10 RX ORDER — DOXEPIN HCL 100 MG
1 CAPSULE ORAL
Refills: 0 | DISCHARGE

## 2024-04-10 RX ORDER — LEVOTHYROXINE SODIUM 125 MCG
1 TABLET ORAL
Refills: 0 | DISCHARGE

## 2024-04-10 RX ORDER — LORATADINE 10 MG/1
1 TABLET ORAL
Refills: 0 | DISCHARGE

## 2024-04-10 RX ORDER — MONTELUKAST 4 MG/1
1 TABLET, CHEWABLE ORAL
Refills: 0 | DISCHARGE

## 2024-04-10 RX ORDER — CYCLOPENTOLATE HYDROCHLORIDE 10 MG/ML
1 SOLUTION/ DROPS OPHTHALMIC
Refills: 0 | Status: DISCONTINUED | OUTPATIENT
Start: 2024-04-10 | End: 2024-04-24

## 2024-04-10 RX ORDER — SENNA PLUS 8.6 MG/1
2 TABLET ORAL
Refills: 0 | DISCHARGE

## 2024-04-10 RX ORDER — BUPROPION HYDROCHLORIDE 150 MG/1
1 TABLET, EXTENDED RELEASE ORAL
Refills: 0 | DISCHARGE

## 2024-04-10 RX ORDER — NITROFURANTOIN MACROCRYSTAL 50 MG
2 CAPSULE ORAL
Refills: 0 | DISCHARGE

## 2024-04-10 RX ORDER — ONDANSETRON 8 MG/1
4 TABLET, FILM COATED ORAL ONCE
Refills: 0 | Status: DISCONTINUED | OUTPATIENT
Start: 2024-04-10 | End: 2024-04-24

## 2024-04-10 RX ORDER — CLONAZEPAM 1 MG
1 TABLET ORAL
Refills: 0 | DISCHARGE

## 2024-04-10 RX ORDER — OMEPRAZOLE 10 MG/1
1 CAPSULE, DELAYED RELEASE ORAL
Refills: 0 | DISCHARGE

## 2024-04-10 RX ORDER — PHENYLEPHRINE HCL 2.5 %
1 DROPS OPHTHALMIC (EYE)
Refills: 0 | Status: DISCONTINUED | OUTPATIENT
Start: 2024-04-10 | End: 2024-04-24

## 2024-04-10 RX ORDER — ERGOCALCIFEROL 1.25 MG/1
0 CAPSULE ORAL
Refills: 0 | DISCHARGE

## 2024-04-10 RX ORDER — GABAPENTIN 400 MG/1
1 CAPSULE ORAL
Refills: 0 | DISCHARGE

## 2024-04-10 RX ORDER — OLMESARTAN MEDOXOMIL 5 MG/1
1 TABLET, FILM COATED ORAL
Refills: 0 | DISCHARGE

## 2024-04-10 RX ADMIN — CYCLOPENTOLATE HYDROCHLORIDE 1 DROP(S): 10 SOLUTION/ DROPS OPHTHALMIC at 08:51

## 2024-04-10 RX ADMIN — OXYCODONE HYDROCHLORIDE 5 MILLIGRAM(S): 5 TABLET ORAL at 11:05

## 2024-04-10 RX ADMIN — Medication 1 DROP(S): at 08:52

## 2024-04-10 RX ADMIN — OXYCODONE HYDROCHLORIDE 5 MILLIGRAM(S): 5 TABLET ORAL at 12:00

## 2024-04-10 RX ADMIN — Medication 1 DROP(S): at 08:30

## 2024-04-10 NOTE — ASU PREOP CHECKLIST - 3.
see pre op medication sheet for eye drops see pre op medication sheet for eye drops dose given as order

## 2024-04-10 NOTE — ASU DISCHARGE PLAN (ADULT/PEDIATRIC) - ASU DISCHARGE DATE/TIME
Would you consider those a side effect or just part of the process of getting the injection?   10-Apr-2024 11:00

## 2024-04-10 NOTE — ASU DISCHARGE PLAN (ADULT/PEDIATRIC) - NS MD DC FALL RISK RISK
For information on Fall & Injury Prevention, visit: [email or web address] OR  [email or web address] OR  https://www.cdc.gov/steadi/patient.html

## 2024-04-10 NOTE — ASU DISCHARGE PLAN (ADULT/PEDIATRIC) - CARE PROVIDER_API CALL
Cora Cesar  Ophthalmology  [address] [place] [address]  Phone: [phone]  Fax: [phone]  Follow Up Time:

## 2024-04-10 NOTE — ASU DISCHARGE PLAN (ADULT/PEDIATRIC) - DISCHARGE TO
Pt awake and alert, Pt is tachypnic and intermittently grunts causing desaturation. O2 increases when grunting stops. Daughter is at bedsid. NAD. Will continue to monitor.    Home

## 2024-04-11 ENCOUNTER — APPOINTMENT (OUTPATIENT)
Dept: OPHTHALMOLOGY | Facility: CLINIC | Age: 74
End: 2024-04-11
Payer: MEDICARE

## 2024-04-11 ENCOUNTER — NON-APPOINTMENT (OUTPATIENT)
Age: 74
End: 2024-04-11

## 2024-04-11 PROCEDURE — 99024 POSTOP FOLLOW-UP VISIT: CPT

## 2024-04-16 NOTE — ED PROVIDER NOTE - OBJECTIVE STATEMENT
Is This A New Presentation, Or A Follow-Up?: Skin Lesion Additional History: Patient states skin tag is enlarging and is bothersome. 71yo female with cough and sob for the last few days, pt tested positive for covid on 11/4 and since then c/o cough and mucous, with sob, no fever, chills, pt had laryngectomy and has trach that she uses no other complaints

## 2024-04-18 ENCOUNTER — APPOINTMENT (OUTPATIENT)
Dept: OPHTHALMOLOGY | Facility: CLINIC | Age: 74
End: 2024-04-18
Payer: MEDICARE

## 2024-04-18 ENCOUNTER — NON-APPOINTMENT (OUTPATIENT)
Age: 74
End: 2024-04-18

## 2024-04-18 PROCEDURE — 99024 POSTOP FOLLOW-UP VISIT: CPT

## 2024-04-22 ENCOUNTER — NON-APPOINTMENT (OUTPATIENT)
Age: 74
End: 2024-04-22

## 2024-04-22 ENCOUNTER — APPOINTMENT (OUTPATIENT)
Dept: OPHTHALMOLOGY | Facility: CLINIC | Age: 74
End: 2024-04-22
Payer: MEDICARE

## 2024-04-22 PROCEDURE — 99024 POSTOP FOLLOW-UP VISIT: CPT

## 2024-05-14 ENCOUNTER — NON-APPOINTMENT (OUTPATIENT)
Age: 74
End: 2024-05-14

## 2024-05-14 ENCOUNTER — APPOINTMENT (OUTPATIENT)
Dept: OPHTHALMOLOGY | Facility: CLINIC | Age: 74
End: 2024-05-14
Payer: MEDICARE

## 2024-05-14 PROCEDURE — 99024 POSTOP FOLLOW-UP VISIT: CPT

## 2024-05-29 ENCOUNTER — APPOINTMENT (OUTPATIENT)
Dept: RADIOLOGY | Facility: CLINIC | Age: 74
End: 2024-05-29

## 2024-05-30 ENCOUNTER — APPOINTMENT (OUTPATIENT)
Dept: RADIOLOGY | Facility: CLINIC | Age: 74
End: 2024-05-30
Payer: MEDICARE

## 2024-05-30 PROCEDURE — 71046 X-RAY EXAM CHEST 2 VIEWS: CPT

## 2024-06-03 NOTE — H&P PST ADULT - SKIN/BREAST COMMENTS
FIRST TRIMESTER OBSTETRIC ULTRASOUND  6/3/2024   Vladislav Ellis MD     INDICATION: Amenorrhea, viability  .  COMPARISON: None.     TECHNIQUE:   Transvaginal imaging was performed to assess the gestation, myometrial/endometrial architecture and ovarian parenchymal detail.    The study includes volumetric sweeps and traditional still imaging technique.      FINDINGS:     A single intrauterine gestation is identified.  Cardiac activity is detected at 179 bpm.      YOLK SAC:  Present and normal in size and appearance.  MEAN CROWN RUMP LENGTH:  26.7 mm = 9 weeks 3 days   AMNIOTIC FLUID/SAC SHAPE:  Within expected normal range.     UTERUS/ADNEXA:   No adnexal mass or pathologic cyst.  No free fluid identified.     IMPRESSION:     Single intrauterine pregnancy of 9 weeks 3d gestational age.  Fetal cardiac activity detected.  No adnexal masses seen.  LMP 2/27/24, inconsistent with US dating; will use ultrasound to establish due date. Final ANDRÉS 1/3/25.   
h/o breast CA b/l mastectomy

## 2024-06-27 ENCOUNTER — APPOINTMENT (OUTPATIENT)
Dept: OTOLARYNGOLOGY | Facility: CLINIC | Age: 74
End: 2024-06-27

## 2024-06-27 PROCEDURE — 92610 EVALUATE SWALLOWING FUNCTION: CPT | Mod: GN

## 2024-06-27 PROCEDURE — 92597 ORAL SPEECH DEVICE EVAL: CPT | Mod: GN

## 2024-07-08 NOTE — PATIENT PROFILE ADULT - NSTOBACCO TYPE_GEN_A_CORE_RD
Cigarettes
An extensive goals of care conversation was held including options, risks and benefits of many medical treatments including CPR, intubation, and tube feeding. Patient wish is to do everything possible to keep him alive. FULL CODE. A MOLST has been completed to this effect.

## 2024-07-18 ENCOUNTER — APPOINTMENT (OUTPATIENT)
Dept: ORTHOPEDIC SURGERY | Facility: CLINIC | Age: 74
End: 2024-07-18

## 2024-07-18 ENCOUNTER — APPOINTMENT (OUTPATIENT)
Dept: OTOLARYNGOLOGY | Facility: CLINIC | Age: 74
End: 2024-07-18

## 2024-07-18 PROCEDURE — 92610 EVALUATE SWALLOWING FUNCTION: CPT | Mod: GN

## 2024-07-18 PROCEDURE — 92597 ORAL SPEECH DEVICE EVAL: CPT | Mod: GN

## 2024-07-22 ENCOUNTER — APPOINTMENT (OUTPATIENT)
Dept: ULTRASOUND IMAGING | Facility: CLINIC | Age: 74
End: 2024-07-22
Payer: MEDICARE

## 2024-07-22 PROCEDURE — 76700 US EXAM ABDOM COMPLETE: CPT

## 2024-08-02 ENCOUNTER — NON-APPOINTMENT (OUTPATIENT)
Age: 74
End: 2024-08-02

## 2024-08-02 ENCOUNTER — OUTPATIENT (OUTPATIENT)
Dept: OUTPATIENT SERVICES | Facility: HOSPITAL | Age: 74
LOS: 1 days | End: 2024-08-02
Payer: MEDICARE

## 2024-08-02 DIAGNOSIS — Z98.89 OTHER SPECIFIED POSTPROCEDURAL STATES: Chronic | ICD-10-CM

## 2024-08-02 DIAGNOSIS — Z87.81 PERSONAL HISTORY OF (HEALED) TRAUMATIC FRACTURE: Chronic | ICD-10-CM

## 2024-08-02 DIAGNOSIS — Z98.890 OTHER SPECIFIED POSTPROCEDURAL STATES: Chronic | ICD-10-CM

## 2024-08-02 DIAGNOSIS — C32.0 MALIGNANT NEOPLASM OF GLOTTIS: ICD-10-CM

## 2024-08-02 DIAGNOSIS — Z90.13 ACQUIRED ABSENCE OF BILATERAL BREASTS AND NIPPLES: Chronic | ICD-10-CM

## 2024-08-02 DIAGNOSIS — Z90.02 ACQUIRED ABSENCE OF LARYNX: Chronic | ICD-10-CM

## 2024-08-02 DIAGNOSIS — Z93.0 TRACHEOSTOMY STATUS: Chronic | ICD-10-CM

## 2024-08-02 DIAGNOSIS — Z98.49 CATARACT EXTRACTION STATUS, UNSPECIFIED EYE: Chronic | ICD-10-CM

## 2024-08-02 PROCEDURE — 74230 X-RAY XM SWLNG FUNCJ C+: CPT

## 2024-08-02 PROCEDURE — 74230 X-RAY XM SWLNG FUNCJ C+: CPT | Mod: 26

## 2024-08-02 PROCEDURE — 74220 X-RAY XM ESOPHAGUS 1CNTRST: CPT

## 2024-08-05 ENCOUNTER — APPOINTMENT (OUTPATIENT)
Dept: OTOLARYNGOLOGY | Facility: CLINIC | Age: 74
End: 2024-08-05

## 2024-08-05 PROCEDURE — 92597 ORAL SPEECH DEVICE EVAL: CPT | Mod: GN

## 2024-08-05 PROCEDURE — 92526 ORAL FUNCTION THERAPY: CPT | Mod: GN

## 2024-08-13 ENCOUNTER — APPOINTMENT (OUTPATIENT)
Dept: OTOLARYNGOLOGY | Facility: CLINIC | Age: 74
End: 2024-08-13

## 2024-09-03 ENCOUNTER — APPOINTMENT (OUTPATIENT)
Dept: OTOLARYNGOLOGY | Facility: CLINIC | Age: 74
End: 2024-09-03
Payer: MEDICARE

## 2024-09-03 PROCEDURE — 92610 EVALUATE SWALLOWING FUNCTION: CPT | Mod: GN

## 2024-09-11 ENCOUNTER — APPOINTMENT (OUTPATIENT)
Dept: PULMONOLOGY | Facility: CLINIC | Age: 74
End: 2024-09-11

## 2024-09-20 NOTE — H&P PST ADULT - LAST CARDIAC ANGIOGRAM/IMAGING
You can access the FollowMyHealth Patient Portal offered by [facility] by registering at the following website: [email or web address]. By joining [employer]’s FollowMyHealth portal, you will also be able to view your health information using other applications (apps) compatible with our system.
denies

## 2024-10-02 ENCOUNTER — APPOINTMENT (OUTPATIENT)
Dept: OTOLARYNGOLOGY | Facility: CLINIC | Age: 74
End: 2024-10-02
Payer: MEDICARE

## 2024-10-02 VITALS
HEIGHT: 62 IN | BODY MASS INDEX: 29.44 KG/M2 | DIASTOLIC BLOOD PRESSURE: 66 MMHG | HEART RATE: 76 BPM | WEIGHT: 160 LBS | SYSTOLIC BLOOD PRESSURE: 116 MMHG

## 2024-10-02 PROCEDURE — 31615 TRCHEOBRNCHSC EST TRACHS INC: CPT | Mod: 59

## 2024-10-02 PROCEDURE — 92511 NASOPHARYNGOSCOPY: CPT | Mod: 59

## 2024-10-02 PROCEDURE — 99203 OFFICE O/P NEW LOW 30 MIN: CPT | Mod: 25

## 2024-10-02 PROCEDURE — 92610 EVALUATE SWALLOWING FUNCTION: CPT | Mod: GN

## 2024-10-02 NOTE — PHYSICAL EXAM
[de-identified] : widely patent stoma, adequate esophageal speech, small mucoid cyst excised and cauterized with silve nitrate  [Normal] : mucosa is normal [Midline] : trachea located in midline position [Laryngoscopy Performed] : laryngoscopy was performed, see procedure section for findings

## 2024-10-02 NOTE — CONSULT LETTER
[Dear  ___] : Dear  [unfilled], [Consult Letter:] : I had the pleasure of evaluating your patient, [unfilled]. [Please see my note below.] : Please see my note below. [Consult Closing:] : Thank you very much for allowing me to participate in the care of this patient.  If you have any questions, please do not hesitate to contact me. [Sincerely,] : Sincerely, [DrAfshan  ___] : Dr. DAY [FreeTextEntry2] : Dr. Alba Mattson [FreeTextEntry3] : Guicho Montanez MD, PhD Chief, Division of Laryngology Department of Otolaryngology [facility] Pediatric Otolaryngology, [facility]  of Otolaryngology [facility]

## 2024-10-02 NOTE — HISTORY OF PRESENT ILLNESS
[de-identified] : 73 year old female with history of Laryngeal Ca s/p Laryngectomy in 2019 with MSK. Presents today for evaluation of dysphagia and FEES w/ Inez SLP. Reports TEP valve keeps failing / leaking air. Tolerating regular diet and thin liquids without difficulty Voice is strong when obstructing stoma. Taking Omeprazole daily with reflux symptoms under control.  Breathing is stable - does not require supplemental O2.

## 2024-10-10 ENCOUNTER — APPOINTMENT (OUTPATIENT)
Dept: PULMONOLOGY | Facility: CLINIC | Age: 74
End: 2024-10-10
Payer: MEDICARE

## 2024-10-10 VITALS
OXYGEN SATURATION: 98 % | BODY MASS INDEX: 33.13 KG/M2 | HEIGHT: 62 IN | WEIGHT: 180 LBS | HEART RATE: 82 BPM | DIASTOLIC BLOOD PRESSURE: 80 MMHG | SYSTOLIC BLOOD PRESSURE: 113 MMHG

## 2024-10-10 PROCEDURE — 99205 OFFICE O/P NEW HI 60 MIN: CPT

## 2024-10-10 RX ORDER — ALBUTEROL SULFATE 90 UG/1
108 (90 BASE) INHALANT RESPIRATORY (INHALATION)
Qty: 1 | Refills: 3 | Status: ACTIVE | COMMUNITY
Start: 2024-10-10 | End: 1900-01-01

## 2024-10-10 RX ORDER — BUDESONIDE AND FORMOTEROL FUMARATE DIHYDRATE 80; 4.5 UG/1; UG/1
80-4.5 AEROSOL RESPIRATORY (INHALATION) TWICE DAILY
Qty: 1 | Refills: 2 | Status: ACTIVE | COMMUNITY
Start: 2024-10-10 | End: 1900-01-01

## 2024-10-10 RX ORDER — BUDESONIDE 0.5 MG/2ML
0.5 INHALANT ORAL
Qty: 60 | Refills: 1 | Status: ACTIVE | COMMUNITY
Start: 2024-10-10 | End: 1900-01-01

## 2024-10-10 RX ORDER — IPRATROPIUM BROMIDE AND ALBUTEROL SULFATE 2.5; .5 MG/3ML; MG/3ML
0.5-2.5 (3) SOLUTION RESPIRATORY (INHALATION)
Qty: 1 | Refills: 2 | Status: ACTIVE | COMMUNITY
Start: 2024-10-10 | End: 1900-01-01

## 2024-10-14 DIAGNOSIS — J45.909 UNSPECIFIED ASTHMA, UNCOMPLICATED: ICD-10-CM

## 2024-10-14 RX ORDER — BUDESONIDE, GLYCOPYRROLATE, AND FORMOTEROL FUMARATE 160; 9; 4.8 UG/1; UG/1; UG/1
160-9-4.8 AEROSOL, METERED RESPIRATORY (INHALATION)
Qty: 1 | Refills: 3 | Status: ACTIVE | COMMUNITY
Start: 2024-10-14 | End: 1900-01-01

## 2024-10-23 ENCOUNTER — APPOINTMENT (OUTPATIENT)
Dept: OTOLARYNGOLOGY | Facility: CLINIC | Age: 74
End: 2024-10-23
Payer: MEDICARE

## 2024-10-23 VITALS
DIASTOLIC BLOOD PRESSURE: 77 MMHG | BODY MASS INDEX: 32.2 KG/M2 | WEIGHT: 175 LBS | OXYGEN SATURATION: 98 % | HEIGHT: 62 IN | HEART RATE: 80 BPM | SYSTOLIC BLOOD PRESSURE: 135 MMHG

## 2024-10-23 PROCEDURE — 31615 TRCHEOBRNCHSC EST TRACHS INC: CPT | Mod: 59

## 2024-10-23 PROCEDURE — 99213 OFFICE O/P EST LOW 20 MIN: CPT | Mod: 25

## 2024-10-23 PROCEDURE — 92597 ORAL SPEECH DEVICE EVAL: CPT | Mod: GN

## 2024-10-23 PROCEDURE — 92511 NASOPHARYNGOSCOPY: CPT | Mod: 59

## 2024-10-23 PROCEDURE — 92610 EVALUATE SWALLOWING FUNCTION: CPT | Mod: GN

## 2024-10-23 RX ORDER — LEVOTHYROXINE SODIUM 0.1 MG/1
100 TABLET ORAL
Refills: 0 | Status: ACTIVE | COMMUNITY

## 2024-11-15 ENCOUNTER — NON-APPOINTMENT (OUTPATIENT)
Age: 74
End: 2024-11-15

## 2024-11-15 ENCOUNTER — APPOINTMENT (OUTPATIENT)
Dept: OPHTHALMOLOGY | Facility: CLINIC | Age: 74
End: 2024-11-15
Payer: MEDICARE

## 2024-11-15 PROCEDURE — 92014 COMPRE OPH EXAM EST PT 1/>: CPT

## 2024-12-18 ENCOUNTER — APPOINTMENT (OUTPATIENT)
Dept: OTOLARYNGOLOGY | Facility: CLINIC | Age: 74
End: 2024-12-18
Payer: MEDICARE

## 2024-12-18 DIAGNOSIS — R22.1 LOCALIZED SWELLING, MASS AND LUMP, NECK: ICD-10-CM

## 2024-12-18 PROCEDURE — 31574Z: CUSTOM

## 2024-12-18 PROCEDURE — 17250 CHEM CAUT OF GRANLTJ TISSUE: CPT

## 2024-12-18 PROCEDURE — 92526 ORAL FUNCTION THERAPY: CPT | Mod: GN

## 2024-12-18 PROCEDURE — 99214 OFFICE O/P EST MOD 30 MIN: CPT | Mod: 25

## 2024-12-18 RX ORDER — MUPIROCIN 20 MG/G
2 OINTMENT TOPICAL 3 TIMES DAILY
Qty: 1 | Refills: 1 | Status: ACTIVE | COMMUNITY
Start: 2024-12-18 | End: 1900-01-01

## 2024-12-26 ENCOUNTER — APPOINTMENT (OUTPATIENT)
Dept: OPHTHALMOLOGY | Facility: CLINIC | Age: 74
End: 2024-12-26

## 2025-01-02 ENCOUNTER — APPOINTMENT (OUTPATIENT)
Dept: CT IMAGING | Facility: CLINIC | Age: 75
End: 2025-01-02
Payer: MEDICARE

## 2025-01-02 PROCEDURE — 70491 CT SOFT TISSUE NECK W/DYE: CPT

## 2025-01-08 ENCOUNTER — APPOINTMENT (OUTPATIENT)
Dept: OTOLARYNGOLOGY | Facility: CLINIC | Age: 75
End: 2025-01-08

## 2025-01-08 PROCEDURE — 92597 ORAL SPEECH DEVICE EVAL: CPT | Mod: GN

## 2025-01-08 PROCEDURE — 92610 EVALUATE SWALLOWING FUNCTION: CPT | Mod: GN

## 2025-01-22 ENCOUNTER — APPOINTMENT (OUTPATIENT)
Dept: OTOLARYNGOLOGY | Facility: CLINIC | Age: 75
End: 2025-01-22
Payer: MEDICARE

## 2025-01-22 ENCOUNTER — APPOINTMENT (OUTPATIENT)
Dept: OTOLARYNGOLOGY | Facility: CLINIC | Age: 75
End: 2025-01-22

## 2025-01-22 VITALS
WEIGHT: 170 LBS | HEIGHT: 63 IN | BODY MASS INDEX: 30.12 KG/M2 | SYSTOLIC BLOOD PRESSURE: 138 MMHG | HEART RATE: 77 BPM | OXYGEN SATURATION: 97 % | DIASTOLIC BLOOD PRESSURE: 80 MMHG

## 2025-01-22 DIAGNOSIS — R22.1 LOCALIZED SWELLING, MASS AND LUMP, NECK: ICD-10-CM

## 2025-01-22 PROCEDURE — 99214 OFFICE O/P EST MOD 30 MIN: CPT | Mod: 25

## 2025-01-22 PROCEDURE — 31615 TRCHEOBRNCHSC EST TRACHS INC: CPT

## 2025-01-22 PROCEDURE — 11441 EXC FACE-MM B9+MARG 0.6-1 CM: CPT

## 2025-01-22 PROCEDURE — 92610 EVALUATE SWALLOWING FUNCTION: CPT | Mod: NC

## 2025-01-22 RX ORDER — SEMAGLUTIDE 0.25 MG/.5ML
0.25 INJECTION, SOLUTION SUBCUTANEOUS
Refills: 0 | Status: ACTIVE | COMMUNITY

## 2025-01-25 NOTE — ASU PATIENT PROFILE, ADULT - NSALCOHOLAMT_GEN_A_CORE_SD
I called  Manav earlier this evening to discuss his magnesium follow up and his message about his output as well as the photo of his wound.  There was no answer.  Will call again soon.   
1-2 drinks

## 2025-01-27 ENCOUNTER — APPOINTMENT (OUTPATIENT)
Dept: PULMONOLOGY | Facility: CLINIC | Age: 75
End: 2025-01-27

## 2025-01-29 ENCOUNTER — NON-APPOINTMENT (OUTPATIENT)
Age: 75
End: 2025-01-29

## 2025-01-29 LAB — CORE LAB BIOPSY: NORMAL

## 2025-02-04 ENCOUNTER — APPOINTMENT (OUTPATIENT)
Dept: OTOLARYNGOLOGY | Facility: CLINIC | Age: 75
End: 2025-02-04

## 2025-02-04 PROCEDURE — 92610 EVALUATE SWALLOWING FUNCTION: CPT | Mod: GN

## 2025-02-04 PROCEDURE — 92597 ORAL SPEECH DEVICE EVAL: CPT | Mod: GN

## 2025-02-07 ENCOUNTER — APPOINTMENT (OUTPATIENT)
Dept: OTOLARYNGOLOGY | Facility: CLINIC | Age: 75
End: 2025-02-07

## 2025-02-07 VITALS — DIASTOLIC BLOOD PRESSURE: 82 MMHG | SYSTOLIC BLOOD PRESSURE: 119 MMHG | HEART RATE: 90 BPM | OXYGEN SATURATION: 99 %

## 2025-02-07 PROCEDURE — 31615 TRCHEOBRNCHSC EST TRACHS INC: CPT | Mod: 59

## 2025-02-07 PROCEDURE — 92511 NASOPHARYNGOSCOPY: CPT | Mod: 59

## 2025-02-07 PROCEDURE — 17250 CHEM CAUT OF GRANLTJ TISSUE: CPT

## 2025-02-07 PROCEDURE — 92526 ORAL FUNCTION THERAPY: CPT | Mod: NC

## 2025-02-07 PROCEDURE — 99214 OFFICE O/P EST MOD 30 MIN: CPT | Mod: 25

## 2025-02-26 ENCOUNTER — APPOINTMENT (OUTPATIENT)
Dept: OTOLARYNGOLOGY | Facility: CLINIC | Age: 75
End: 2025-02-26
Payer: MEDICARE

## 2025-02-26 PROCEDURE — 31615 TRCHEOBRNCHSC EST TRACHS INC: CPT | Mod: 59

## 2025-02-26 PROCEDURE — 31573 LARGSC W/THER INJECTION: CPT | Mod: 59

## 2025-02-26 PROCEDURE — 99214 OFFICE O/P EST MOD 30 MIN: CPT | Mod: 25

## 2025-02-26 PROCEDURE — 92597 ORAL SPEECH DEVICE EVAL: CPT | Mod: GN

## 2025-02-26 PROCEDURE — 92610 EVALUATE SWALLOWING FUNCTION: CPT | Mod: GN

## 2025-03-11 ENCOUNTER — APPOINTMENT (OUTPATIENT)
Dept: OTOLARYNGOLOGY | Facility: CLINIC | Age: 75
End: 2025-03-11

## 2025-03-11 PROCEDURE — 99213 OFFICE O/P EST LOW 20 MIN: CPT | Mod: 25

## 2025-03-11 PROCEDURE — 31615 TRCHEOBRNCHSC EST TRACHS INC: CPT

## 2025-03-11 PROCEDURE — 92526 ORAL FUNCTION THERAPY: CPT | Mod: GN

## 2025-04-09 ENCOUNTER — APPOINTMENT (OUTPATIENT)
Dept: OTOLARYNGOLOGY | Facility: CLINIC | Age: 75
End: 2025-04-09

## 2025-04-09 PROCEDURE — 92597 ORAL SPEECH DEVICE EVAL: CPT | Mod: GN

## 2025-04-09 PROCEDURE — 92610 EVALUATE SWALLOWING FUNCTION: CPT | Mod: GN

## 2025-05-05 NOTE — ED ADULT NURSE NOTE - EXTENSIONS OF SELF_ADULT
S-(situation):   Patient calling to request RX for apixaban ANTICOAGULANT (ELIQUIS) 5 MG tablet to be sent to pharmacy.    B-(background):   Original was sent by: Authorized By: Augustin Roque MD     A-(assessment):   Patient is concerned about the cost of medication.    R-(recommendations):   Please route message to MTM to review and see if any assistance programs available.  Please contact patient to update.          Caitlin Tan RN, BSN  [facility] Triage           Eyeglasses

## 2025-05-20 ENCOUNTER — APPOINTMENT (OUTPATIENT)
Dept: OTOLARYNGOLOGY | Facility: CLINIC | Age: 75
End: 2025-05-20

## 2025-05-20 PROCEDURE — 92597 ORAL SPEECH DEVICE EVAL: CPT | Mod: GN

## 2025-05-20 PROCEDURE — 92610 EVALUATE SWALLOWING FUNCTION: CPT | Mod: GN

## 2025-06-03 ENCOUNTER — NON-APPOINTMENT (OUTPATIENT)
Age: 75
End: 2025-06-03

## 2025-06-05 ENCOUNTER — APPOINTMENT (OUTPATIENT)
Dept: OPHTHALMOLOGY | Facility: CLINIC | Age: 75
End: 2025-06-05

## 2025-06-10 ENCOUNTER — APPOINTMENT (OUTPATIENT)
Dept: OTOLARYNGOLOGY | Facility: CLINIC | Age: 75
End: 2025-06-10

## 2025-06-10 ENCOUNTER — APPOINTMENT (OUTPATIENT)
Dept: OTOLARYNGOLOGY | Facility: CLINIC | Age: 75
End: 2025-06-10
Payer: MEDICARE

## 2025-06-10 VITALS — HEART RATE: 87 BPM | SYSTOLIC BLOOD PRESSURE: 133 MMHG | DIASTOLIC BLOOD PRESSURE: 63 MMHG | OXYGEN SATURATION: 96 %

## 2025-06-10 PROCEDURE — 31615 TRCHEOBRNCHSC EST TRACHS INC: CPT

## 2025-06-10 PROCEDURE — 92610 EVALUATE SWALLOWING FUNCTION: CPT | Mod: GN

## 2025-06-10 PROCEDURE — 99214 OFFICE O/P EST MOD 30 MIN: CPT | Mod: 25

## 2025-06-12 ENCOUNTER — TRANSCRIPTION ENCOUNTER (OUTPATIENT)
Age: 75
End: 2025-06-12

## 2025-06-12 ENCOUNTER — OUTPATIENT (OUTPATIENT)
Dept: OUTPATIENT SERVICES | Facility: HOSPITAL | Age: 75
LOS: 1 days | End: 2025-06-12
Payer: MEDICARE

## 2025-06-12 VITALS
TEMPERATURE: 98 F | SYSTOLIC BLOOD PRESSURE: 134 MMHG | WEIGHT: 169.98 LBS | RESPIRATION RATE: 18 BRPM | OXYGEN SATURATION: 96 % | DIASTOLIC BLOOD PRESSURE: 82 MMHG | HEIGHT: 63 IN | HEART RATE: 95 BPM

## 2025-06-12 VITALS
OXYGEN SATURATION: 96 % | SYSTOLIC BLOOD PRESSURE: 124 MMHG | HEART RATE: 86 BPM | DIASTOLIC BLOOD PRESSURE: 72 MMHG | RESPIRATION RATE: 18 BRPM

## 2025-06-12 DIAGNOSIS — Z93.0 TRACHEOSTOMY STATUS: Chronic | ICD-10-CM

## 2025-06-12 DIAGNOSIS — Z98.890 OTHER SPECIFIED POSTPROCEDURAL STATES: Chronic | ICD-10-CM

## 2025-06-12 DIAGNOSIS — Z87.81 PERSONAL HISTORY OF (HEALED) TRAUMATIC FRACTURE: Chronic | ICD-10-CM

## 2025-06-12 DIAGNOSIS — Z90.02 ACQUIRED ABSENCE OF LARYNX: Chronic | ICD-10-CM

## 2025-06-12 DIAGNOSIS — Z90.13 ACQUIRED ABSENCE OF BILATERAL BREASTS AND NIPPLES: Chronic | ICD-10-CM

## 2025-06-12 DIAGNOSIS — Z93.0 TRACHEOSTOMY STATUS: ICD-10-CM

## 2025-06-12 DIAGNOSIS — Z98.89 OTHER SPECIFIED POSTPROCEDURAL STATES: Chronic | ICD-10-CM

## 2025-06-12 DIAGNOSIS — D50.9 IRON DEFICIENCY ANEMIA, UNSPECIFIED: ICD-10-CM

## 2025-06-12 DIAGNOSIS — Z90.02 ACQUIRED ABSENCE OF LARYNX: ICD-10-CM

## 2025-06-12 DIAGNOSIS — Z98.49 CATARACT EXTRACTION STATUS, UNSPECIFIED EYE: Chronic | ICD-10-CM

## 2025-06-12 LAB — GLUCOSE BLDC GLUCOMTR-MCNC: 105 MG/DL — HIGH (ref 70–99)

## 2025-06-12 PROCEDURE — 88305 TISSUE EXAM BY PATHOLOGIST: CPT | Mod: 26

## 2025-06-12 PROCEDURE — 31615 TRCHEOBRNCHSC EST TRACHS INC: CPT

## 2025-06-12 PROCEDURE — 45378 DIAGNOSTIC COLONOSCOPY: CPT

## 2025-06-12 PROCEDURE — 99214 OFFICE O/P EST MOD 30 MIN: CPT | Mod: 25

## 2025-06-12 PROCEDURE — 82962 GLUCOSE BLOOD TEST: CPT

## 2025-06-12 PROCEDURE — 88305 TISSUE EXAM BY PATHOLOGIST: CPT

## 2025-06-12 PROCEDURE — 43239 EGD BIOPSY SINGLE/MULTIPLE: CPT

## 2025-06-12 DEVICE — NET RETRV ROT ROTH 2.5MMX230CM: Type: IMPLANTABLE DEVICE | Status: FUNCTIONAL

## 2025-06-12 RX ORDER — ACETAMINOPHEN 500 MG/5ML
1000 LIQUID (ML) ORAL ONCE
Refills: 0 | Status: COMPLETED | OUTPATIENT
Start: 2025-06-12 | End: 2025-06-12

## 2025-06-12 RX ADMIN — Medication 400 MILLIGRAM(S): at 17:02

## 2025-06-12 RX ADMIN — Medication 30 MILLILITER(S): at 17:23

## 2025-06-12 NOTE — ASU PATIENT PROFILE, ADULT - NSICDXPASTMEDICALHX_GEN_ALL_CORE_FT
Detail Level: Zone
Continue Regimen: sulfa wash daily\\nincrease tretinoin 0.025->0.05% cream QHS\\ndoxy 100mg bid
PAST MEDICAL HISTORY:  Age-related nuclear cataract, right eye     Anxiety     Arthritis     Asthma no h/o recent exacerbation    Breast Cancer s/p bilateral mastectomy    CVA (cerebrovascular accident)     Depression     Diverticulitis history treated with ABT    Environmental Allergies dust, mold, mildew, cat dander,    Fibromyalgia     GERD (gastroesophageal reflux disease)     H/O fracture of arm     Herniated Disc     HTN (hypertension)     Hyponatremia     Hypothyroid     Laryngeal cancer October 2015 & had Radiation    Neuropathy lumbar herniated disc affecting feet    Osteoarthritis critera    Osteoporosis

## 2025-06-12 NOTE — ASU PATIENT PROFILE, ADULT - NSICDXPASTSURGICALHX_GEN_ALL_CORE_FT
PAST SURGICAL HISTORY:  Anal Abscess      Section X 2    Cosmetic Surgery liposuction abdomen 11/24/10    H/O breast surgery Reconstructive surgery - Right & Left    H/O fracture of arm     History of cataract extraction     History of laryngoscopy excision of lesion & biopsy 2018    History of loop recorder     S/P carpal tunnel release Bilateral    S/P Cholecystectomy     S/P excision of vocal cord nodule 2015    S/P Gastric Bypass  Dr. Gibson    S/P laryngectomy     S/P Lumpectomy of Breast left     S/P mastectomy, bilateral     S/P trigger finger release     Tracheostomy status

## 2025-06-12 NOTE — CONSULT NOTE ADULT - ASSESSMENT
75 y/o Female, with extensive PMHx, PSHx of laryngeal CA s/p total laryngectomy, now with chronic rob tube and speaking valve, presented to endoscopy today for scheduled endoscopy/colonoscopy for further workup of CURT. ENT consulted for trach change. Pt states she usually self-changes her rob tube or is changed by her outpt ENT, Dr. Montanez. On exam, Rob tube in place, secured with velcro tie. Rob tube was removed and replaced with a #9 portex cuffed, with cuff fully deflated, secured with velcro tie. Speaking valve in place. Clear secretions suctioned from stoma. Bedside tracheoscopy performed, jaquan visualized.

## 2025-06-12 NOTE — ASU PATIENT PROFILE, ADULT - FALL HARM RISK - TYPE OF ASSESSMENT
Epifanio Marshall visit has been disqualified for  unable to assess skin in the low lighting patient had and he could not move to a better room. .  I have provided the patient with the reason for the disqualification and informed them that they will not be charged for this visit. I have recommended Disposition of Urgent Care .    
Admission

## 2025-06-12 NOTE — PRE PROCEDURE NOTE - PRE PROCEDURE EVALUATION
Attending Physician: Nga    Procedure: EGD and colonoscopy    Indication for Procedure: CURT  ________________________________________________________  PAST MEDICAL & SURGICAL HISTORY:  Depression      Fibromyalgia      Asthma  no h/o recent exacerbation      Herniated Disc      Hypothyroid      Breast Cancer  s/p bilateral mastectomy      Anxiety      Osteoporosis      Osteoarthritis  critera      Environmental Allergies  dust, mold, mildew, cat dander,      Laryngeal cancer  2015 & had Radiation      Arthritis      Neuropathy  lumbar herniated disc affecting feet      Diverticulitis  history treated with ABT      GERD (gastroesophageal reflux disease)      CVA (cerebrovascular accident)      Age-related nuclear cataract, right eye      H/O fracture of arm      Hyponatremia      HTN (hypertension)       Section  X 2      S/P Cholecystectomy        S/P Lumpectomy of Breast  left       Anal Abscess        Cosmetic Surgery  liposuction abdomen 11/24/10      S/P Gastric Bypass   Dr. Gibson      S/P carpal tunnel release  Bilateral      S/P mastectomy, bilateral  2010      H/O breast surgery  Reconstructive surgery - Right & Left      S/P excision of vocal cord nodule  2015      History of laryngoscopy  excision of lesion & biopsy 2018      S/P laryngectomy      History of loop recorder      History of cataract extraction      S/P trigger finger release      H/O fracture of arm      Tracheostomy status        ALLERGIES:  sulfa drugs (Rash; Fever)    HOME MEDICATIONS:  Acidophilus oral tablet: 1 tab(s) orally once a day (in the morning)  ARIPiprazole 5 mg oral tablet: 1 tab(s) orally once a day (at bedtime)  aspirin 81 mg oral tablet: 1 tab(s) orally once a day (in the morning)  atorvastatin 80 mg oral tablet: 1 tab(s) orally once a day (in the morning)  Biotin 56807 mcg oral capsule: 1 cap(s) orally once a day  buPROPion 75 mg oral tablet: 1 tab(s) orally once a day (in the morning)  clonazePAM 1 mg oral tablet: 1 tab(s) orally 3 times a day as needed  doxepin 50 mg oral capsule: 1 cap(s) orally once a day (at bedtime)  gabapentin 300 mg oral tablet: 1 tab(s) orally 2 times a day  levothyroxine 100 mcg (0.1 mg) oral tablet: 1 tab(s) orally once a day (in the morning)  loratadine 10 mg oral tablet: 1 tab(s) orally once a day (in the morning)  Macrobid 100 mg oral capsule: 2 cap(s) orally 2 times a day  methylphenidate 27 mg/24 hr oral tablet, extended release: 1 orally once a day  montelukast 10 mg oral tablet: 1 tab(s) orally once a day (at bedtime)  Multiple Vitamins oral tablet: 2 tab(s) orally once a day (in the morning) LD 3/1/2024  olmesartan 20 mg oral tablet: 1 tab(s) orally once a day (in the morning)  omeprazole 40 mg oral delayed release capsule: 1 cap(s) orally once a day (in the morning)  prednisoLONE acetate 1% preservative-free ophthalmic suspension: 1 drop(s) to each affected eye once a day to right eye  Senokot 8.6 mg oral tablet: 2 tab(s) orally once a day (in the morning)  sodium chloride 1 g oral tablet: 1 tab(s) orally once a day (in the morning)  Super B Complex: 1 tab orally once a day (in the morning)  Vitamin B12: 5000 mcg orally once a day AM  Vitamin D2: 5000 int unit orally once a day (in the morning)      PHYSICAL EXAMINATION:    Constitutional: NAD  HEENT: Anicteric, EOMI   Neck:  No JVD  Respiratory: Normal respiratory effort, no accessory muscle use  Cardiovascular: S1 and S2, normal rate  Gastrointestinal: BS+, soft, NT/ND  Extremities: No peripheral edema  Neurological: A/O x 3, no focal deficits  Psychiatric: Normal mood, normal affect  Skin: No rashes on exposed skin    COMMENTS:    The patient is a suitable candidate for the planned procedure unless box checked [ ]  No, explain:

## 2025-06-12 NOTE — ASU PATIENT PROFILE, ADULT - FALL HARM RISK - PT AGE POPULATION HIDDEN
Child accompanied by mother    CONCERNS:   PO- eating well.    Seems to be a little selective, but this comes and goes.     Sleeping fine- still with nap.     Pooping fine.          Lesa  is a 19 month old male  infant who presents for his  well baby evaluation.        REVIEW OF SYSTEMS is negative including Eyes, Ears, Nose, Throat, Cardiovascular, Respiratory, Gastrointestinal, Genitourinary, Musculoskeletal, Skin, Neurologic.     SOCIAL, FAMILY, AND MEDICAL/SURGICAL HISTORY reviewed. Nursing notes reviewed in detail.     PHYSICAL EXAMINATION  GENERAL: Lesa  is an alert, vigorous male  with appropriate behavior. He is in no acute distress.  SKIN: His skin is warm with normal turgor. The color of the skin is normal. There is no rash. There are no bruises or other signs of injury.  HEAD: The head is atraumatic and normocephalic. The anterior fontanel is fibrous.  EYES: Lesa  is able to see. The eyelids are normal. The exam of the eyes reveals globes that are symmetrical and normal. Both eyes open. The conjunctivae appear normal. There is no excessive tearing. The corneae are clear and of normal diameter. There is no fixed deviation of gaze. Red reflexes are seen bilaterally; no dark spots are visualized.  EARS: By report Lesa is able to hear. Exam of the ears reveals the pinnae to be normal. The external auditory canals are clear and the tympanic membranes are normal.  NOSE: There is no nasal flaring.  THROAT: The oropharynx is normal.  TEETH: The teeth are normal.  NECK: The neck is normal. The thyroid is not palpably enlarged.  LYMPH NODES: There are no palpably enlarged lymph nodes in the neck, axillae, or groin.  TRUNK AND THORAX: There are no lesions on the trunk. The thorax is symmetrical and longer in the transverse than in the AP (Anterior/Posterior) diameter. There are no retractions.  LUNGS: The lung fields are clear to auscultation.  HEART: The precordium is quiet. The heart rhythm is grossly  regular. S1 and S2 are normal. The heart tones are strong. There are no murmurs. The femoral pulses are normal.  ABDOMEN: There is not an umbilical hernia. The abdomen is flat and soft. There are no masses. Neither the liver nor the spleen is enlarged. The bowel sounds are normal.  BACK: The back is normal.  GENITALIA: Lesa  has normal male genitalia, circumcised and testes descended  No inguinal hernias are present.  EXTREMITIES: The hip exam is normal. The legs are of equal length. There are no hip clicks. NEUROLOGIC: Lesa  displays normal tone throughout. He is walking and he  has normal reflexes.     ASSESSMENT:   1. Well child check:   19 month old male  Infant        PLAN:  GUIDANCE:   Discussed.  Handout given which covers the topics listed below.   Weaning bottle to cup.  Dental care.  Bedtime routine.  Temper tantrums.  Toilet training readiness.  Injury prevention.    Immunizations:   Prevnar, Hib, dtap   Capillary Lead screen today    ASQ-3 Screen    Results: All Reassuring/Routine Follow-up           Communication: Reassuring Score: 60   Gross Motor: Reassuring Score: 60   Fine Motor: Reassuring Score: 60   Problem Solving: Reassuring Score: 60   Personal-Social: Reassuring Score: 60   Other Concerns:                 Disposition:    M-CHAT Performed?: Yes           MCHAT-R Screening Score & Risk Level  Total MCHAT-R Score:    Risk level based on score:                                 Adult

## 2025-06-12 NOTE — ASU DISCHARGE PLAN (ADULT/PEDIATRIC) - FINANCIAL ASSISTANCE
[facility] provides services at a reduced cost to those who are determined to be eligible through [facility]’s financial assistance program. Information regarding [facility]’s financial assistance program can be found by going to [email or web address] or by calling [phone].

## 2025-06-12 NOTE — PRE-ANESTHESIA EVALUATION ADULT - BP NONINVASIVE DIASTOLIC (MM HG)
"LVM for patient to call the office. Patient needs to schedule annual medicare wellness visit.       Relay     \"Patient needs to schedule annual medicare wellness visit. \"                  "
82

## 2025-06-12 NOTE — CONSULT NOTE ADULT - SUBJECTIVE AND OBJECTIVE BOX
CC: Trach change    HPI: 75 y/o Female, with extensive PMHx, PSHx of laryngeal CA s/p total laryngectomy, now with chronic rob tube and speaking valve, presented to endoscopy today for scheduled endoscopy/colonoscopy for further workup of CURT. ENT consulted for trach change. Pt states she usually self-changes her rob tube or is changed by her outpt ENT, Dr. Montanez.                 PAST MEDICAL & SURGICAL HISTORY:  Depression      Fibromyalgia      Asthma  no h/o recent exacerbation      Herniated Disc      Hypothyroid      Breast Cancer  s/p bilateral mastectomy      Anxiety      Osteoporosis      Osteoarthritis  critera      Environmental Allergies  dust, mold, mildew, cat dander,      Laryngeal cancer  2015 & had Radiation      Arthritis      Neuropathy  lumbar herniated disc affecting feet      Diverticulitis  history treated with ABT      GERD (gastroesophageal reflux disease)      CVA (cerebrovascular accident)      Age-related nuclear cataract, right eye      H/O fracture of arm      Hyponatremia      HTN (hypertension)       Section  X 2      S/P Cholecystectomy        S/P Lumpectomy of Breast  left       Anal Abscess        Cosmetic Surgery  liposuction abdomen 11/24/10      S/P Gastric Bypass   Dr. Gibson      S/P carpal tunnel release  Bilateral      S/P mastectomy, bilateral  2010      H/O breast surgery  Reconstructive surgery - Right & Left      S/P excision of vocal cord nodule  2015      History of laryngoscopy  excision of lesion & biopsy 2018      S/P laryngectomy      History of loop recorder      History of cataract extraction      S/P trigger finger release      H/O fracture of arm      Tracheostomy status        Allergies    sulfa drugs (Rash; Fever)    Intolerances      MEDICATIONS  (STANDING):  sodium chloride 0.9%. 500 milliLiter(s) (30 mL/Hr) IV Continuous <Continuous>    MEDICATIONS  (PRN):      Social History: No pertinent SHx    Family history: No pertinent FHx    ROS:   ENT: all negative except as noted in HPI   CV: denies palpitations  Pulm: denies SOB, cough, hemoptysis  GI: denies change in appetite, indigestion, n/v  : denies pertinent urinary symptoms, urgency  Neuro: denies numbness/tingling, loss of sensation  Psych: denies anxiety  MS: denies muscle weakness, instability  Heme: denies easy bruising or bleeding  Endo: denies heat/cold intolerance, excessive sweating  Vascular: denies LE edema    Vital Signs Last 24 Hrs  T(C): 36.7 (2025 14:17), Max: 36.7 (2025 13:40)  T(F): 98 (2025 13:40), Max: 98 (2025 13:40)  HR: 95 (2025 14:17) (95 - 95)  BP: 134/82 (2025 14:17) (134/82 - 134/82)  BP(mean): --  RR: 18 (2025 14:17) (18 - 18)  SpO2: 96% (:17) (96% - 96%)    Parameters below as of 2025 13:40  Patient On (Oxygen Delivery Method): room air                  PHYSICAL EXAM:  Gen: NAD  Skin: No rashes, bruises, or lesions  Head: Normocephalic, Atraumatic  Face: no edema, erythema, or fluctuance. Parotid glands soft without mass  Eyes: no scleral injection  Nose: Nares bilaterally patent, no abnormal discharge b/l.   Mouth: No Stridor / Drooling / Trismus.  Mucosa moist, tongue/uvula midline, oropharynx clear  Neck: Rob tube in place, secured with velcro tie. Flat, supple.   Lymphatic: No lymphadenopathy  Resp: breathing easily on RA, no stridor  CV: no peripheral cyanosis  GI: nondistended   Peripheral vascular: no JVD  Neuro: facial nerve intact, no facial droop      Trach change  Risks and benefits discussed with pt. Then, pt was placed in a supine position with neck extended. A 10 CC syringe used to completely remove any remaining air in the trach cuff. Rob tube was removed and replaced with a #9 portex cuffed, with cuff fully deflated, secured with velcro tie. Speaking valve in place. No bleeding. Clear secretions suctioned from stoma. Bedside tracheoscopy performed, jaquan visualized, no purulence, no erythema, no evidence of tracheomalacia. Pt tolerated the procedure well without complications.

## 2025-06-12 NOTE — ASU DISCHARGE PLAN (ADULT/PEDIATRIC) - FREQUENT HAND WASHING PREVENTS THE SPREAD OF INFECTION.
Patient Specific Counseling (Will Not Stick From Patient To Patient): Reassured her this is a normal reaction. \\nCounseled pt to use aquaphor/Vaseline BID x 2 weeks. Detail Level: Detailed Statement Selected

## 2025-06-12 NOTE — CONSULT NOTE ADULT - NS ATTEND AMEND GEN_ALL_CORE FT
ENT consulted for Trach Change    73 y/o Female, with extensive PMHx, PSHx of laryngeal CA s/p total laryngectomy, now with chronic rob tube and speaking valve, presented to endoscopy today for scheduled endoscopy/colonoscopy for further workup of CURT. Pt states she usually self-changes her rob tube or is changed by her outpt ENT, Dr. Montanez.     Physical exam shows laryngectomy tube in place, secured with velcro tie. Rob tube was removed and replaced with a #9 portex cuffed, with cuff fully deflated, secured with velcro tie. Speaking valve in place. Clear secretions suctioned from stoma.     Bedside tracheoscopy shows #9 Portex in place. jaquan visualized, no purulence, no erythema, no evidence of tracheomalacia    Recommend:  Laryngectomy Tube Exchange  - Please call ENT back prior to pt discharge so that pt can be changed back to her original trach (Madison Tube)  - HOB elevation  - Suction PRN  - Continue trach care  - Care per primary team

## 2025-06-12 NOTE — CONSULT NOTE ADULT - PROBLEM SELECTOR RECOMMENDATION 9
- Please call ENT back prior to pt discharge so that pt can be changed back to her original trach (Madison Tube)  - HOB elevation  - Suction PRN  - Continue trach care  - Care per primary team

## 2025-06-12 NOTE — ASU PATIENT PROFILE, ADULT - NS TRANSFER PATIENT BELONGINGS
[Initial Visit] : an initial visit for [FreeTextEntry2] : left shoulder pain Cell Phone/PDA (specify)/Electronic Device (specify)/Other belongings/Clothing

## 2025-06-16 LAB — SURGICAL PATHOLOGY STUDY: SIGNIFICANT CHANGE UP

## 2025-06-24 ENCOUNTER — APPOINTMENT (OUTPATIENT)
Dept: OTOLARYNGOLOGY | Facility: CLINIC | Age: 75
End: 2025-06-24
Payer: MEDICARE

## 2025-06-24 PROCEDURE — 92610 EVALUATE SWALLOWING FUNCTION: CPT | Mod: GN

## 2025-07-01 ENCOUNTER — OUTPATIENT (OUTPATIENT)
Dept: OUTPATIENT SERVICES | Facility: HOSPITAL | Age: 75
LOS: 1 days | Discharge: HOME | End: 2025-07-01

## 2025-07-01 DIAGNOSIS — Z98.89 OTHER SPECIFIED POSTPROCEDURAL STATES: Chronic | ICD-10-CM

## 2025-07-01 DIAGNOSIS — Z98.49 CATARACT EXTRACTION STATUS, UNSPECIFIED EYE: Chronic | ICD-10-CM

## 2025-07-01 DIAGNOSIS — Z98.890 OTHER SPECIFIED POSTPROCEDURAL STATES: Chronic | ICD-10-CM

## 2025-07-01 DIAGNOSIS — D64.9 ANEMIA, UNSPECIFIED: ICD-10-CM

## 2025-07-01 DIAGNOSIS — Z87.81 PERSONAL HISTORY OF (HEALED) TRAUMATIC FRACTURE: Chronic | ICD-10-CM

## 2025-07-01 DIAGNOSIS — Z93.0 TRACHEOSTOMY STATUS: Chronic | ICD-10-CM

## 2025-07-01 DIAGNOSIS — Z90.13 ACQUIRED ABSENCE OF BILATERAL BREASTS AND NIPPLES: Chronic | ICD-10-CM

## 2025-07-01 DIAGNOSIS — Z90.02 ACQUIRED ABSENCE OF LARYNX: Chronic | ICD-10-CM

## 2025-07-02 ENCOUNTER — RESULT REVIEW (OUTPATIENT)
Age: 75
End: 2025-07-02

## 2025-07-02 ENCOUNTER — APPOINTMENT (OUTPATIENT)
Dept: HEMATOLOGY ONCOLOGY | Facility: CLINIC | Age: 75
End: 2025-07-02
Payer: MEDICARE

## 2025-07-02 VITALS
TEMPERATURE: 97.9 F | RESPIRATION RATE: 16 BRPM | BODY MASS INDEX: 29.96 KG/M2 | SYSTOLIC BLOOD PRESSURE: 120 MMHG | OXYGEN SATURATION: 97 % | HEART RATE: 80 BPM | HEIGHT: 63 IN | DIASTOLIC BLOOD PRESSURE: 70 MMHG | WEIGHT: 169.09 LBS

## 2025-07-02 PROBLEM — D72.810 LYMPHOPENIA: Status: ACTIVE | Noted: 2025-07-02

## 2025-07-02 PROBLEM — D64.9 ANEMIA, UNSPECIFIED TYPE: Status: ACTIVE | Noted: 2025-07-02

## 2025-07-02 LAB
BASOPHILS # BLD AUTO: 0.07 K/UL — SIGNIFICANT CHANGE UP (ref 0–0.2)
BASOPHILS NFR BLD AUTO: 1.2 % — SIGNIFICANT CHANGE UP (ref 0–2)
EOSINOPHIL # BLD AUTO: 0.19 K/UL — SIGNIFICANT CHANGE UP (ref 0–0.5)
EOSINOPHIL NFR BLD AUTO: 3.3 % — SIGNIFICANT CHANGE UP (ref 0–6)
HCT VFR BLD CALC: 37.5 % — SIGNIFICANT CHANGE UP (ref 34.5–45)
HGB BLD-MCNC: 12.5 G/DL — SIGNIFICANT CHANGE UP (ref 11.5–15.5)
IMM GRANULOCYTES NFR BLD AUTO: 0.4 % — SIGNIFICANT CHANGE UP (ref 0–0.9)
LYMPHOCYTES # BLD AUTO: 1.21 K/UL — SIGNIFICANT CHANGE UP (ref 1–3.3)
LYMPHOCYTES # BLD AUTO: 21.3 % — SIGNIFICANT CHANGE UP (ref 13–44)
MCHC RBC-ENTMCNC: 31.8 PG — SIGNIFICANT CHANGE UP (ref 27–34)
MCHC RBC-ENTMCNC: 33.3 G/DL — SIGNIFICANT CHANGE UP (ref 32–36)
MCV RBC AUTO: 95.4 FL — SIGNIFICANT CHANGE UP (ref 80–100)
MONOCYTES # BLD AUTO: 0.38 K/UL — SIGNIFICANT CHANGE UP (ref 0–0.9)
MONOCYTES NFR BLD AUTO: 6.7 % — SIGNIFICANT CHANGE UP (ref 2–14)
NEUTROPHILS # BLD AUTO: 3.82 K/UL — SIGNIFICANT CHANGE UP (ref 1.8–7.4)
NEUTROPHILS NFR BLD AUTO: 67.1 % — SIGNIFICANT CHANGE UP (ref 43–77)
NRBC BLD AUTO-RTO: 0 /100 WBCS — SIGNIFICANT CHANGE UP (ref 0–0)
PLATELET # BLD AUTO: 234 K/UL — SIGNIFICANT CHANGE UP (ref 150–400)
RBC # BLD: 3.93 M/UL — SIGNIFICANT CHANGE UP (ref 3.8–5.2)
RBC # FLD: 13.6 % — SIGNIFICANT CHANGE UP (ref 10.3–14.5)
RETICS #: 82.1 K/UL — SIGNIFICANT CHANGE UP (ref 25–125)
RETICS/RBC NFR: 2.1 % — SIGNIFICANT CHANGE UP (ref 0.5–2.5)
WBC # BLD: 5.69 K/UL — SIGNIFICANT CHANGE UP (ref 3.8–10.5)
WBC # FLD AUTO: 5.69 K/UL — SIGNIFICANT CHANGE UP (ref 3.8–10.5)

## 2025-07-02 PROCEDURE — 99205 OFFICE O/P NEW HI 60 MIN: CPT

## 2025-07-02 PROCEDURE — G2211 COMPLEX E/M VISIT ADD ON: CPT

## 2025-07-02 RX ORDER — ARIPIPRAZOLE 2 MG/1
2 TABLET ORAL
Refills: 0 | Status: ACTIVE | COMMUNITY

## 2025-07-07 LAB — VIT B6 SERPL-MCNC: 18.8 UG/L

## 2025-07-08 ENCOUNTER — APPOINTMENT (OUTPATIENT)
Dept: OTOLARYNGOLOGY | Facility: CLINIC | Age: 75
End: 2025-07-08
Payer: MEDICARE

## 2025-07-08 VITALS
WEIGHT: 169 LBS | HEIGHT: 63 IN | DIASTOLIC BLOOD PRESSURE: 91 MMHG | RESPIRATION RATE: 17 BRPM | SYSTOLIC BLOOD PRESSURE: 148 MMHG | BODY MASS INDEX: 29.95 KG/M2 | OXYGEN SATURATION: 98 % | HEART RATE: 84 BPM

## 2025-07-08 PROBLEM — B37.0 ORAL THRUSH: Status: ACTIVE | Noted: 2025-07-08

## 2025-07-08 PROCEDURE — 99214 OFFICE O/P EST MOD 30 MIN: CPT | Mod: 25

## 2025-07-08 PROCEDURE — 92526 ORAL FUNCTION THERAPY: CPT | Mod: GN

## 2025-07-08 PROCEDURE — 92511 NASOPHARYNGOSCOPY: CPT | Mod: 59

## 2025-07-08 PROCEDURE — 31615 TRCHEOBRNCHSC EST TRACHS INC: CPT | Mod: 59

## 2025-07-08 RX ORDER — FAMOTIDINE 40 MG/1
40 TABLET, FILM COATED ORAL
Qty: 90 | Refills: 1 | Status: ACTIVE | COMMUNITY
Start: 2025-07-08 | End: 1900-01-01

## 2025-07-08 RX ORDER — NYSTATIN 100000 [USP'U]/ML
100000 SUSPENSION ORAL 3 TIMES DAILY
Qty: 1 | Refills: 3 | Status: ACTIVE | COMMUNITY
Start: 2025-07-08 | End: 1900-01-01

## 2025-07-18 ENCOUNTER — NON-APPOINTMENT (OUTPATIENT)
Age: 75
End: 2025-07-18

## 2025-07-18 LAB
ALBUMIN MFR SERPL ELPH: 61.1 %
ALBUMIN SERPL ELPH-MCNC: 4.6 G/DL
ALBUMIN SERPL-MCNC: 4.4 G/DL
ALBUMIN/GLOB SERPL: 1.6 RATIO
ALP BLD-CCNC: 116 U/L
ALPHA1 GLOB MFR SERPL ELPH: 4.1 %
ALPHA1 GLOB SERPL ELPH-MCNC: 0.3 G/DL
ALPHA2 GLOB MFR SERPL ELPH: 10.1 %
ALPHA2 GLOB SERPL ELPH-MCNC: 0.7 G/DL
ALT SERPL-CCNC: 31 U/L
ANION GAP SERPL CALC-SCNC: 13 MMOL/L
AST SERPL-CCNC: 31 U/L
B-GLOBULIN MFR SERPL ELPH: 12.4 %
B-GLOBULIN SERPL ELPH-MCNC: 0.9 G/DL
BILIRUB SERPL-MCNC: 0.4 MG/DL
BUN SERPL-MCNC: 12 MG/DL
CALCIUM SERPL-MCNC: 9.4 MG/DL
CD16+CD56+ CELLS # BLD: 204 CELLS/UL
CD16+CD56+ CELLS NFR BLD: 14 %
CD19 CELLS NFR BLD: 166 CELLS/UL
CD3 CELLS # BLD: 1092 CELLS/UL
CD3 CELLS NFR BLD: 73 %
CD3+CD4+ CELLS # BLD: 727 CELLS/UL
CD3+CD4+ CELLS NFR BLD: 47 %
CD3+CD4+ CELLS/CD3+CD8+ CLL SPEC: 1.88 RATIO
CD3+CD8+ CELLS # SPEC: 386 CELLS/UL
CD3+CD8+ CELLS NFR BLD: 25 %
CELLS.CD3-CD19+/CELLS IN BLOOD: 11 %
CHLORIDE SERPL-SCNC: 96 MMOL/L
CO2 SERPL-SCNC: 22 MMOL/L
CREAT SERPL-MCNC: 0.65 MG/DL
DEPRECATED KAPPA LC FREE/LAMBDA SER: 1.47 RATIO
EGFRCR SERPLBLD CKD-EPI 2021: 93 ML/MIN/1.73M2
FERRITIN SERPL-MCNC: 143 NG/ML
FOLATE SERPL-MCNC: 15.8 NG/ML
GAMMA GLOB FLD ELPH-MCNC: 0.9 G/DL
GAMMA GLOB MFR SERPL ELPH: 12.3 %
GLUCOSE SERPL-MCNC: 98 MG/DL
HAPTOGLOB SERPL-MCNC: 135 MG/DL
HAV IGM SER QL: NONREACTIVE
HBV CORE IGM SER QL: NONREACTIVE
HBV SURFACE AG SER QL: NORMAL
HCV AB SER QL: NONREACTIVE
HCV S/CO RATIO: 0.31 S/CO
HIV1+2 AB SPEC QL IA.RAPID: NONREACTIVE
IGA SERPL-MCNC: 157 MG/DL
IGG SERPL-MCNC: 776 MG/DL
IGM SERPL-MCNC: 184 MG/DL
INTERPRETATION SERPL IEP-IMP: NORMAL
IRON SATN MFR SERPL: 24 %
IRON SERPL-MCNC: 86 UG/DL
KAPPA LC CSF-MCNC: 1.75 MG/DL
KAPPA LC SERPL-MCNC: 2.58 MG/DL
LDH SERPL-CCNC: 213 U/L
M PROTEIN SPEC IFE-MCNC: NORMAL
METHYLMALONATE SERPL-SCNC: 147 NMOL/L
POTASSIUM SERPL-SCNC: 5 MMOL/L
PROT SERPL-MCNC: 7.2 G/DL
SODIUM SERPL-SCNC: 132 MMOL/L
TIBC SERPL-MCNC: 367 UG/DL
UIBC SERPL-MCNC: 281 UG/DL
VIABILITY: NORMAL
VIT B12 SERPL-MCNC: >2000 PG/ML

## 2025-07-22 ENCOUNTER — APPOINTMENT (OUTPATIENT)
Dept: OTOLARYNGOLOGY | Facility: CLINIC | Age: 75
End: 2025-07-22
Payer: MEDICARE

## 2025-07-22 PROCEDURE — 92597 ORAL SPEECH DEVICE EVAL: CPT | Mod: GN

## 2025-07-22 PROCEDURE — 99213 OFFICE O/P EST LOW 20 MIN: CPT | Mod: 25

## 2025-07-22 PROCEDURE — 31615 TRCHEOBRNCHSC EST TRACHS INC: CPT

## 2025-08-12 ENCOUNTER — APPOINTMENT (OUTPATIENT)
Dept: OTOLARYNGOLOGY | Facility: CLINIC | Age: 75
End: 2025-08-12
Payer: MEDICARE

## 2025-08-12 PROCEDURE — 92610 EVALUATE SWALLOWING FUNCTION: CPT | Mod: GN

## 2025-08-19 DIAGNOSIS — J86.0 PYOTHORAX WITH FISTULA: ICD-10-CM

## 2025-08-27 ENCOUNTER — APPOINTMENT (OUTPATIENT)
Dept: OTOLARYNGOLOGY | Facility: CLINIC | Age: 75
End: 2025-08-27
Payer: MEDICARE

## 2025-08-27 PROCEDURE — 92610 EVALUATE SWALLOWING FUNCTION: CPT | Mod: GN

## 2025-08-27 PROCEDURE — 92597 ORAL SPEECH DEVICE EVAL: CPT | Mod: GN

## 2025-09-03 ENCOUNTER — APPOINTMENT (OUTPATIENT)
Dept: OTOLARYNGOLOGY | Facility: CLINIC | Age: 75
End: 2025-09-03
Payer: MEDICARE

## 2025-09-03 PROCEDURE — 92557 COMPREHENSIVE HEARING TEST: CPT

## 2025-09-03 PROCEDURE — 92567 TYMPANOMETRY: CPT

## 2025-09-03 PROCEDURE — 92610 EVALUATE SWALLOWING FUNCTION: CPT | Mod: NC

## 2025-09-04 ENCOUNTER — NON-APPOINTMENT (OUTPATIENT)
Age: 75
End: 2025-09-04

## 2025-09-04 ENCOUNTER — APPOINTMENT (OUTPATIENT)
Dept: OPHTHALMOLOGY | Facility: CLINIC | Age: 75
End: 2025-09-04
Payer: MEDICARE

## 2025-09-04 PROCEDURE — 92014 COMPRE OPH EXAM EST PT 1/>: CPT | Mod: 25

## 2025-09-04 PROCEDURE — 66821 AFTER CATARACT LASER SURGERY: CPT | Mod: LT

## 2025-09-09 ENCOUNTER — APPOINTMENT (OUTPATIENT)
Dept: OTOLARYNGOLOGY | Facility: CLINIC | Age: 75
End: 2025-09-09
Payer: MEDICARE

## 2025-09-09 PROCEDURE — 92526 ORAL FUNCTION THERAPY: CPT | Mod: GN

## 2025-09-11 ENCOUNTER — APPOINTMENT (OUTPATIENT)
Dept: OPHTHALMOLOGY | Facility: CLINIC | Age: 75
End: 2025-09-11
Payer: MEDICARE

## 2025-09-11 ENCOUNTER — NON-APPOINTMENT (OUTPATIENT)
Age: 75
End: 2025-09-11

## 2025-09-11 PROCEDURE — 92014 COMPRE OPH EXAM EST PT 1/>: CPT | Mod: 24

## (undated) DEVICE — Device

## (undated) DEVICE — SUT VICRYL 7-0 18" TG140-8 DA

## (undated) DEVICE — TOURNIQUET CUFF 18" DUAL PORT SINGLE BLADDER LUER LOCK (BLACK)

## (undated) DEVICE — BIOPSY FORCEP RADIAL JAW 4 STANDARD WITH NEEDLE

## (undated) DEVICE — PACK EXTREMITY

## (undated) DEVICE — DRAPE MAYO STAND 30"

## (undated) DEVICE — DRAPE C ARM UNIVERSAL

## (undated) DEVICE — TUBING SUCTION CONN 6FT STERILE

## (undated) DEVICE — POSITIONER CARDIAC BUMP

## (undated) DEVICE — CATH IV SAFE BC 20G X 1.16" (PINK)

## (undated) DEVICE — FORCEP RADIAL JAW 4 PEDIATRIC W NDL 1.8MM 2MM 160CM DISP

## (undated) DEVICE — VENODYNE/SCD SLEEVE CALF LARGE

## (undated) DEVICE — SLING SHOULDER IMMOBILIZER CLINIC LARGE

## (undated) DEVICE — SUT MONOSOF 3-0 18" C-14

## (undated) DEVICE — LAP PAD 18 X 18"

## (undated) DEVICE — FOLEY HOLDER STATLOCK 2 WAY ADULT

## (undated) DEVICE — CAPSULE RETRACTOR MST

## (undated) DEVICE — CATH IV SAFE BC 22G X 1" (BLUE)

## (undated) DEVICE — KNIFE ALCON CRESCENT STRAIGHT BEVEL UP 2.3MM (PINK)

## (undated) DEVICE — BRUSH COLONOSCOPY CYTOLOGY

## (undated) DEVICE — KNFE VITREC 20G

## (undated) DEVICE — KNIFE SLIT 2.75MM

## (undated) DEVICE — STRYKER INTERPULSE HANDPIECE W IRR SUCTION TUBE

## (undated) DEVICE — TAPE SILK 3"

## (undated) DEVICE — DRAPE IOBAN 23" X 23"

## (undated) DEVICE — TOURNIQUET CUFF 24" DUAL PORT SINGLE BLADDER LUER LOCK  (BLACK)

## (undated) DEVICE — MARKING PEN W RULER

## (undated) DEVICE — DRSG STERISTRIPS 0.5 X 4"

## (undated) DEVICE — CYSTOTOME IRRIGATING SHORT CURVE 25G

## (undated) DEVICE — KIT CENTURION ANTERIOR

## (undated) DEVICE — GLV 7.5 PROTEXIS (WHITE)

## (undated) DEVICE — IRRIGATOR BIO SHIELD

## (undated) DEVICE — KNIFE OPTH SHARPOINT CRESCENT 2MM

## (undated) DEVICE — DRILL BIT SYNTHES ORTHO QC 2.5X110MM

## (undated) DEVICE — SUT POLYSORB 2-0 30" GS-21 UNDYED

## (undated) DEVICE — NUCLEUS HYDRODISSECTOR PEARCE ANGLED 25G X 22MM

## (undated) DEVICE — SUT SURGIPRO 3-0 18" P-12

## (undated) DEVICE — GLV 8 PROTEXIS (WHITE)

## (undated) DEVICE — POLY TRAP ETRAP

## (undated) DEVICE — DRSG ACE BANDAGE 4" NS

## (undated) DEVICE — ELCTR GROUNDING PAD ADULT COVIDIEN

## (undated) DEVICE — GLV 6.5 PROTEXIS (WHITE)

## (undated) DEVICE — SOL IRR POUR H2O 250ML

## (undated) DEVICE — GOWN TRIMAX LG

## (undated) DEVICE — SOL IRR POUR NS 0.9% 500ML

## (undated) DEVICE — MEDICATION LABELS W MARKER

## (undated) DEVICE — SOL IRR BAG NS 0.9% 3000ML

## (undated) DEVICE — DRSG STOCKINETTE IMPERVIOUS MED

## (undated) DEVICE — BIPOLAR FORCEP SYMMETRY BAYONET 7" X 1.5MM SMOOTH (SILVER)

## (undated) DEVICE — DRAPE TOWEL BLUE 17" X 24"

## (undated) DEVICE — SUT SOFSILK 0 30" V-20

## (undated) DEVICE — SUCTION YANKAUER NO CONTROL VENT

## (undated) DEVICE — CLAMP BX HOT RAD JAW 3

## (undated) DEVICE — SYR LUER LOK 50CC

## (undated) DEVICE — GLV 7 PROTEXIS (WHITE)

## (undated) DEVICE — CARTRIDGE ALCON MONARCH III "D"

## (undated) DEVICE — ELCTR ERASER BI-P BVL 45DEG 18G

## (undated) DEVICE — WARMING BLANKET LOWER ADULT

## (undated) DEVICE — RETRACTOR SYNERGETICS IRIS FLEXIBLE DISP

## (undated) DEVICE — TRANSFORMER INTREPID I/A 0.3MM

## (undated) DEVICE — PACK IV START WITH CHG

## (undated) DEVICE — TUBING IV SET GRAVITY 3Y 100" MACRO

## (undated) DEVICE — SLEEVE INTREPID MICROSMOOTH ULTRA INFUSION 0.9MM (PINK)

## (undated) DEVICE — POSITIONER BOOT CRADLE

## (undated) DEVICE — GLV 9 DERMAPRENE ULTRA

## (undated) DEVICE — STAPLER SKIN VISI-STAT 35 WIDE

## (undated) DEVICE — SUT POLYSORB 0 30" GS-21 UNDYED

## (undated) DEVICE — VISITEC 4X4

## (undated) DEVICE — SOL IRR BAG BSS 500ML

## (undated) DEVICE — KNIFE ALCON SLIT INTREPID SINGLE BEVEL ANGLED 2.4MM (PURPLE)

## (undated) DEVICE — SLING ARM CHIEFTAIN MESH MEDIUM

## (undated) DEVICE — POSITIONER FOAM EGG CRATE ULNAR 2PCS (PINK)

## (undated) DEVICE — ELCTR BIPOLAR CORD J&J 12FT DISP

## (undated) DEVICE — SENSOR O2 FINGER ADULT

## (undated) DEVICE — FORCEP RADIAL JAW 4 JUMBO 2.8MM 3.2MM 240CM ORANGE DISP

## (undated) DEVICE — WOUND IRR SURGIPHOR

## (undated) DEVICE — SOL INJ NS 0.9% 500ML 2 PORT

## (undated) DEVICE — GLV 8.5 PROTEXIS (WHITE)

## (undated) DEVICE — SUT POLYSORB 3-0 30" P-12 UNDYED

## (undated) DEVICE — TUBING SUCTION 20FT